# Patient Record
Sex: FEMALE | Race: BLACK OR AFRICAN AMERICAN | Employment: OTHER | URBAN - METROPOLITAN AREA
[De-identification: names, ages, dates, MRNs, and addresses within clinical notes are randomized per-mention and may not be internally consistent; named-entity substitution may affect disease eponyms.]

---

## 2017-05-25 ENCOUNTER — OFFICE VISIT (OUTPATIENT)
Dept: FAMILY MEDICINE CLINIC | Age: 82
End: 2017-05-25

## 2017-05-25 VITALS
HEIGHT: 61 IN | WEIGHT: 133 LBS | TEMPERATURE: 97.5 F | BODY MASS INDEX: 25.11 KG/M2 | SYSTOLIC BLOOD PRESSURE: 159 MMHG | DIASTOLIC BLOOD PRESSURE: 84 MMHG | RESPIRATION RATE: 18 BRPM | HEART RATE: 83 BPM

## 2017-05-25 DIAGNOSIS — I10 ESSENTIAL HYPERTENSION: ICD-10-CM

## 2017-05-25 DIAGNOSIS — E78.5 HYPERLIPIDEMIA, UNSPECIFIED HYPERLIPIDEMIA TYPE: ICD-10-CM

## 2017-05-25 DIAGNOSIS — Z78.0 POSTMENOPAUSAL: ICD-10-CM

## 2017-05-25 DIAGNOSIS — M17.0 PRIMARY OSTEOARTHRITIS OF BOTH KNEES: ICD-10-CM

## 2017-05-25 DIAGNOSIS — M81.0 OSTEOPOROSIS WITHOUT CURRENT PATHOLOGICAL FRACTURE, UNSPECIFIED OSTEOPOROSIS TYPE: ICD-10-CM

## 2017-05-25 DIAGNOSIS — E11.65 TYPE 2 DIABETES MELLITUS WITH HYPERGLYCEMIA, WITHOUT LONG-TERM CURRENT USE OF INSULIN (HCC): Primary | ICD-10-CM

## 2017-05-25 DIAGNOSIS — E55.9 VITAMIN D DEFICIENCY: ICD-10-CM

## 2017-05-25 LAB
MICROALBUMIN UR TEST STR-MCNC: 150 MG/L
MICROALBUMIN/CREAT RATIO POC: 300 MG/G

## 2017-05-25 RX ORDER — ATORVASTATIN CALCIUM 10 MG/1
TABLET, FILM COATED ORAL DAILY
COMMUNITY
End: 2017-10-05 | Stop reason: SDUPTHER

## 2017-05-25 RX ORDER — ACETAMINOPHEN 325 MG/1
TABLET ORAL
COMMUNITY

## 2017-05-25 NOTE — PATIENT INSTRUCTIONS

## 2017-05-25 NOTE — PROGRESS NOTES
Chief Complaint   Patient presents with   Celestino Merlin is a 80y.o. year old female who presents for follow up of her chronic medical problems    Previous PCP Dr. Magy Stern not seen anybody since Dr. Hardy Honeycutt retired; last seen Feb 2017    \"Mandaeism\" with her meds per daughter    Lives with granddaughter    Denies polyuria, polydipsia and polyphagia  Checks blood sugars daily-blood sugars in the 90's, no blood sugar drops since she has been on Actos    BP elevated today-has not yet taken meds because she is fasting    Has been on Boniva for a while  Last Dexa was 2014-osteoporosis  No hx of fracture  Arthritis on the left foot-has rods there          Health Maintenance Due   Topic Date Due    DTaP/Tdap/Td series (1 - Tdap) 12/30/1950    ZOSTER VACCINE AGE 60>  12/30/1989    FOOT EXAM Q1  09/08/2015    MICROALBUMIN Q1  11/04/2015    HEMOGLOBIN A1C Q6M  05/03/2016    LIPID PANEL Q1  07/02/2016    Pneumococcal 65+ Low/Medium Risk (2 of 2 - PPSV23) 08/19/2016    MEDICARE YEARLY EXAM  08/19/2016    EYE EXAM RETINAL OR DILATED Q1  11/24/2016-wears eyeglasses; has implants on both eyes/sees them Q6 months     Used to go to Podiatry, Dr. Garry Ortiz but now lives far-needs a new podiatrist-onychomychosis, diabetic foot check     ROS:    Pt denies: Wt loss, Fever/Chills, HA, Visual changes, Fatigue, Chest pain, SOB, SALOMON, Abd pain, N/V/D/C, Blood in stool or urine, Edema. Pertinent positive as above in HPI.  All others were negative    Patient Active Problem List   Diagnosis Code    Type II or unspecified type diabetes mellitus without mention of complication, not stated as uncontrolled E11.9    Essential hypertension I10    Osteoporosis without current pathological fracture M81.0    Hyperlipidemia E78.5    Primary osteoarthritis of both knees M17.0    Vitamin D deficiency E55.9       Past Medical History:   Diagnosis Date    Diabetes ([facility] [address]     Essential hypertension 5/25/2017    Hyperlipidemia 5/25/2017       Current Outpatient Prescriptions   Medication Sig Dispense Refill    atorvastatin (LIPITOR) 10 mg tablet Take  by mouth daily.  acetaminophen (TYLENOL) 325 mg tablet Take  by mouth every four (4) hours as needed for Pain.  ibandronate (BONIVA) 150 mg tablet TAKE 1 TABLET (150 MG) BY MOUTH EVERY 30 DAYS. 1 Tab 10    losartan-hydrochlorothiazide (HYZAAR) 100-12.5 mg per tablet take 1 tablet by mouth once daily 30 Tab 3    pioglitazone (ACTOS) 30 mg tablet TAKE ONE TABLET BY MOUTH EVERY DAY 30 Tab 3    ergocalciferol (VITAMIN D2) 50,000 unit capsule Take 50,000 Units by mouth every seven (7) days.  FREESTYLE TEST strip PATIENT TESTING BLOOD SUGAR ONCE DAILY 1 Package 10    FREESTYLE LANCETS 28 gauge misc DAILY USE 1 Package 10    aspirin (ASPIRIN LOW-STRENGTH) 81 mg chewable tablet CHEW AND SWALLOW ONE TABLET BY MOUTH DAILY 90 Tab 1       History   Smoking Status    Never Smoker   Smokeless Tobacco    Never Used       No Known Allergies    Patient Labs were reviewed: yes      Patient Past Records were reviewed:  yes        Objective:     Vitals:    05/25/17 0939   BP: 159/84   Pulse: 83   Resp: 18   Temp: 97.5 °F (36.4 °C)   TempSrc: Oral   Weight: 133 lb (60.3 kg)   Height: 5' 1\" (1.549 m)     Body mass index is 25.13 kg/(m^2). Exam:   Appearance: alert, well appearing,  oriented to person, place, and time, acyanotic, in no respiratory distress and well hydrated. Walks with a cane  HEENT:  NC/AT, pink conj, anicteric sclerae  Neck:  No cervical lymphadenopathy, no JVD, no thyromegaly, no carotid bruit  Heart:  RRR without M/R/G  Lungs:  CTAB, no rhonchi, rales, or wheezes with good air exchange   Abdomen:  Non-tender, pos bowel sounds, no hepatosplenomegaly  Ext:  No C/C/E, crepitus on both knees, antalgic gait    Skin: no rash  Neuro: no lateralizing signs, CNs II-XII intact      Assessment/ Plan: Rody Evans was seen today for [facility] care.     Diagnoses and all orders for this visit:    Type 2 diabetes mellitus with hyperglycemia, without long-term current use of insulin (HCC)-continue with Actos  -     HEMOGLOBIN A1C W/O EAG; Future  -     AMB POC URINE, MICROALBUMIN, SEMIQUANTITATIVE  -     REFERRAL TO PODIATRY    Essential hypertension-advised to take BP med prior to next visit  -     CBC WITH AUTOMATED DIFF; Future  -     METABOLIC PANEL, COMPREHENSIVE; Future    Hyperlipidemia, unspecified hyperlipidemia type-on Lipitor  -     LIPID PANEL; Future    Osteoporosis without current pathological fracture, unspecified osteoporosis type-advised she can discontinue Boniva as she has been on it for over 5 years, continue with daily Calcium OTC  -     Dexa Bone Density Study Axial ([id number]); Future    Vitamin D deficiency-on rx  -     VITAMIN D, 25 HYDROXY; Future    Postmenopausal  -     Dexa Bone Density Study Axial ([id number]); Future    Primary osteoarthritis of both knees-declined to see Ortho, continue with Tylenol prn        Follow-up Disposition:  Return in about 3 months (around 8/25/2017) for follow up, annual wellness. I have discussed the diagnosis with the patient and the intended plan as seen in the above orders. The patient has received an After-Visit Summary and questions were answered concerning future plans. Medication Side Effects and Warnings were discussed with patient: yes    Patient verbalized understanding of above instructions.     Ngoc Yoo MD  Internal Medicine  [facility]

## 2017-05-25 NOTE — MR AVS SNAPSHOT
Visit Information Date & Time Provider Department Dept. Phone Encounter #  
 5/25/2017 10:30 AM Hetal Barnes MD [address] [id number] Follow-up Instructions Return in about 3 months (around 8/25/2017) for follow up, annual wellness. Upcoming Health Maintenance Date Due DTaP/Tdap/Td series (1 - Tdap) 12/30/1950 ZOSTER VACCINE AGE 60> 12/30/1989 FOOT EXAM Q1 9/8/2015 MICROALBUMIN Q1 11/4/2015 HEMOGLOBIN A1C Q6M 5/3/2016 LIPID PANEL Q1 7/2/2016 Pneumococcal 65+ Low/Medium Risk (2 of 2 - PPSV23) 8/19/2016 MEDICARE YEARLY EXAM 8/19/2016 EYE EXAM RETINAL OR DILATED Q1 11/24/2016 INFLUENZA AGE 9 TO ADULT 8/1/2017 GLAUCOMA SCREENING Q2Y 11/24/2017 Allergies as of 5/25/2017  Review Complete On: 5/25/2017 By: Hetal Barnes MD  
 No Known Allergies Current Immunizations  Reviewed on 5/25/2017 Name Date Influenza High Dose Vaccine PF 11/1/2016 Influenza Vaccine 11/4/2014  9:37 AM, 10/10/2013 Influenza Vaccine (Quad) 11/3/2015  9:58 AM  
 Influenza Vaccine Split 10/18/2012 11:44 AM, 9/29/2011, 3/1/2011, 3/1/2011, 10/6/2010 Pneumococcal Conjugate (PCV-13) 8/19/2015  2:46 PM  
 Td 1/11/2013 12:00 AM  
 Zoster Vaccine, Live 1/1/2016 Reviewed by Hetal Barnes MD on 5/25/2017 at 10:26 AM  
 Reviewed by Hetal Barnes MD on 5/25/2017 at 10:31 AM  
You Were Diagnosed With   
  
 Codes Comments Type 2 diabetes mellitus with hyperglycemia, without long-term current use of insulin (HCC)    -  Primary ICD-10-CM: E11.65 ICD-9-CM: 250.00, 790.29 Essential hypertension     ICD-10-CM: I10 
ICD-9-CM: 401.9 Hyperlipidemia, unspecified hyperlipidemia type     ICD-10-CM: E78.5 ICD-9-CM: 272.4  Osteoporosis without current pathological fracture, unspecified osteoporosis type     ICD-10-CM: M81.0 ICD-9-CM: 733.00 Vitamin D deficiency     ICD-10-CM: E55.9 ICD-9-CM: 268.9 Postmenopausal     ICD-10-CM: Z78.0 ICD-9-CM: V49.81 Primary osteoarthritis of both knees     ICD-10-CM: M17.0 ICD-9-CM: 715.16 Vitals BP Pulse Temp Resp Height(growth percentile) Weight(growth percentile) 159/84 83 97.5 °F (36.4 °C) (Oral) 18 5' 1\" (1.549 m) 133 lb (60.3 kg) BMI OB Status Smoking Status 25.13 kg/m2 Hysterectomy Never Smoker BMI and BSA Data Body Mass Index Body Surface Area  
 25.13 kg/m 2 1.61 m 2 Preferred Pharmacy Pharmacy Name Phone [address] [address] [address] [place] [address] [phone] Your Updated Medication List  
  
   
This list is accurate as of: 5/25/17 10:46 AM.  Always use your most recent med list.  
  
  
  
  
 aspirin 81 mg chewable tablet Commonly known as:  ASPIRIN LOW-STRENGTH  
CHEW AND SWALLOW ONE TABLET BY MOUTH DAILY  
  
 atorvastatin 10 mg tablet Commonly known as:  LIPITOR Take  by mouth daily. FREESTYLE LANCETS 28 gauge Misc Generic drug:  lancets DAILY USE FREESTYLE TEST strip Generic drug:  glucose blood VI test strips PATIENT TESTING BLOOD SUGAR ONCE DAILY  
  
 ibandronate 150 mg tablet Commonly known as:  Bill Chaidez TAKE 1 TABLET (150 MG) BY MOUTH EVERY 30 DAYS. losartan-hydroCHLOROthiazide 100-12.5 mg per tablet Commonly known as:  HYZAAR  
take 1 tablet by mouth once daily  
  
 pioglitazone 30 mg tablet Commonly known as:  ACTOS  
TAKE ONE TABLET BY MOUTH EVERY DAY  
  
 TYLENOL 325 mg tablet Generic drug:  acetaminophen Take  by mouth every four (4) hours as needed for Pain. VITAMIN D2 50,000 unit capsule Generic drug:  ergocalciferol Take 50,000 Units by mouth every seven (7) days. We Performed the Following AMB POC URINE, MICROALBUMIN, SEMIQUANTITATIVE [19955 CPT(R)] REFERRAL TO PODIATRY [REF90 Custom] Comments:  
 Please evaluate patient for onychomycosis and diabetic foot check-wants to see Podiatry around [facility] Follow-up Instructions Return in about 3 months (around 8/25/2017) for follow up, annual wellness. To-Do List   
 05/25/2017 Lab:  CBC WITH AUTOMATED DIFF   
  
 05/25/2017 Lab:  HEMOGLOBIN A1C W/O EAG   
  
 05/25/2017 Lab:  LIPID PANEL   
  
 05/25/2017 Lab:  METABOLIC PANEL, COMPREHENSIVE   
  
 05/25/2017 Lab:  VITAMIN D, 25 HYDROXY   
  
 05/28/2017 Imaging:  DEXA BONE DENSITY STUDY AXIAL Referral Information Referral ID Referred By Referred To  
  
 [id number] Hortensia Calix Not Available Visits Status Start Date End Date 1 New Request 5/25/17 5/25/18 If your referral has a status of pending review or denied, additional information will be sent to support the outcome of this decision. Patient Instructions Learning About Diabetes Food Guidelines Your Care Instructions Meal planning is important to manage diabetes. It helps keep your blood sugar at a target level (which you set with your doctor). You don't have to eat special foods. You can eat what your family eats, including sweets once in a while. But you do have to pay attention to how often you eat and how much you eat of certain foods. You may want to work with a dietitian or a certified diabetes educator (CDE) to help you plan meals and snacks. A dietitian or CDE can also help you lose weight if that is one of your goals. What should you know about eating carbs? Managing the amount of carbohydrate (carbs) you eat is an important part of healthy meals when you have diabetes. Carbohydrate is found in many foods. · Learn which foods have carbs. And learn the amounts of carbs in different foods.  
¨ Bread, cereal, pasta, and rice have about 15 grams of carbs in a serving. A serving is 1 slice of bread (1 ounce), ½ cup of cooked cereal, or 1/3 cup of cooked pasta or rice. ¨ Fruits have 15 grams of carbs in a serving. A serving is 1 small fresh fruit, such as an apple or orange; ½ of a banana; ½ cup of cooked or canned fruit; ½ cup of fruit juice; 1 cup of melon or raspberries; or 2 tablespoons of dried fruit. ¨ Milk and no-sugar-added yogurt have 15 grams of carbs in a serving. A serving is 1 cup of milk or 2/3 cup of no-sugar-added yogurt. ¨ Starchy vegetables have 15 grams of carbs in a serving. A serving is ½ cup of mashed potatoes or sweet potato; 1 cup winter squash; ½ of a small baked potato; ½ cup of cooked beans; or ½ cup cooked corn or green peas. · Learn how much carbs to eat each day and at each meal. A dietitian or CDE can teach you how to keep track of the amount of carbs you eat. This is called carbohydrate counting. · If you are not sure how to count carbohydrate grams, use the Plate Method to plan meals. It is a good, quick way to make sure that you have a balanced meal. It also helps you spread carbs throughout the day. ¨ Divide your plate by types of foods. Put non-starchy vegetables on half the plate, meat or other protein food on one-quarter of the plate, and a grain or starchy vegetable in the final quarter of the plate. To this you can add a small piece of fruit and 1 cup of milk or yogurt, depending on how many carbs you are supposed to eat at a meal. 
· Try to eat about the same amount of carbs at each meal. Do not \"save up\" your daily allowance of carbs to eat at one meal. 
· Proteins have very little or no carbs per serving. Examples of proteins are beef, chicken, turkey, fish, eggs, tofu, cheese, cottage cheese, and peanut butter. A serving size of meat is 3 ounces, which is about the size of a deck of cards.  Examples of meat substitute serving sizes (equal to 1 ounce of meat) are 1/4 cup of cottage cheese, 1 egg, 1 tablespoon of peanut butter, and ½ cup of tofu. How can you eat out and still eat healthy? · Learn to estimate the serving sizes of foods that have carbohydrate. If you measure food at home, it will be easier to estimate the amount in a serving of restaurant food. · If the meal you order has too much carbohydrate (such as potatoes, corn, or baked beans), ask to have a low-carbohydrate food instead. Ask for a salad or green vegetables. · If you use insulin, check your blood sugar before and after eating out to help you plan how much to eat in the future. · If you eat more carbohydrate at a meal than you had planned, take a walk or do other exercise. This will help lower your blood sugar. What else should you know? · Limit saturated fat, such as the fat from meat and dairy products. This is a healthy choice because people who have diabetes are at higher risk of heart disease. So choose lean cuts of meat and nonfat or low-fat dairy products. Use olive or canola oil instead of butter or shortening when cooking. · Don't skip meals. Your blood sugar may drop too low if you skip meals and take insulin or certain medicines for diabetes. · Check with your doctor before you drink alcohol. Alcohol can cause your blood sugar to drop too low. Alcohol can also cause a bad reaction if you take certain diabetes medicines. Follow-up care is a key part of your treatment and safety. Be sure to make and go to all appointments, and call your doctor if you are having problems. It's also a good idea to know your test results and keep a list of the medicines you take. Where can you learn more? Go to http://kosta-angle.info/. Enter W124 in the search box to learn more about \"Learning About Diabetes Food Guidelines. \" Current as of: May 23, 2016 Content Version: 11.2 © 0465-3578 [employer].  Care instructions adapted under license by [employer] (which disclaims liability or warranty for this information). If you have questions about a medical condition or this instruction, always ask your healthcare professional. [address] any warranty or liability for your use of this information. Introducing [facility] HEALTH SERVICES! [facility] introduces [employer] patient portal. Now you can access parts of your medical record, email your doctor's office, and request medication refills online. 1. In your internet browser, go to [email or web address] 2. Click on the First Time User? Click Here link in the Sign In box. You will see the New Member Sign Up page. 3. Enter your [employer] Access Code exactly as it appears below. You will not need to use this code after youve completed the sign-up process. If you do not sign up before the expiration date, you must request a new code. · [employer] Access Code: [email or web address] Expires: 8/23/2017 10:42 AM 
 
4. Enter the last four digits of your Social Security Number (xxxx) and Date of Birth (mm/dd/yyyy) as indicated and click Submit. You will be taken to the next sign-up page. 5. Create a [employer] ID. This will be your [employer] login ID and cannot be changed, so think of one that is secure and easy to remember. 6. Create a [employer] password. You can change your password at any time. 7. Enter your Password Reset Question and Answer. This can be used at a later time if you forget your password. 8. Enter your e-mail address. You will receive e-mail notification when new information is available in [address] 9. Click Sign Up. You can now view and download portions of your medical record. 10. Click the Download Summary menu link to download a portable copy of your medical information. If you have questions, please visit the Frequently Asked Questions section of the [employer] website. Remember, [employer] is NOT to be used for urgent needs. For medical emergencies, dial 911. Now available from your iPhone and Android! Please provide this summary of care documentation to your next provider. Your primary care clinician is listed as Jordyn Calvin. If you have any questions after today's visit, please call [phone].

## 2017-05-25 NOTE — PROGRESS NOTES
1. Have you been to the ER, urgent care clinic since your last visit? Hospitalized since your last visit? Yes ER at [facility] in April for pain under left breast.    2. Have you seen or consulted any other health care providers outside of the [facility] since your last visit? Include any pap smears or colon screening.  No

## 2017-06-01 LAB
25(OH)D3+25(OH)D2 SERPL-MCNC: 27.5 NG/ML (ref 30–100)
ALBUMIN SERPL-MCNC: 4.4 G/DL (ref 3.5–4.7)
ALBUMIN/GLOB SERPL: 1.8 {RATIO} (ref 1.2–2.2)
ALP SERPL-CCNC: 67 IU/L (ref 39–117)
ALT SERPL-CCNC: 18 IU/L (ref 0–32)
AST SERPL-CCNC: 23 IU/L (ref 0–40)
BASOPHILS # BLD AUTO: 0 X10E3/UL (ref 0–0.2)
BASOPHILS NFR BLD AUTO: 1 %
BILIRUB SERPL-MCNC: 0.4 MG/DL (ref 0–1.2)
BUN SERPL-MCNC: 15 MG/DL (ref 8–27)
BUN/CREAT SERPL: 17 (ref 12–28)
CALCIUM SERPL-MCNC: 9.1 MG/DL (ref 8.7–10.3)
CHLORIDE SERPL-SCNC: 104 MMOL/L (ref 96–106)
CHOLEST SERPL-MCNC: 149 MG/DL (ref 100–199)
CO2 SERPL-SCNC: 24 MMOL/L (ref 18–29)
CREAT SERPL-MCNC: 0.89 MG/DL (ref 0.57–1)
EOSINOPHIL # BLD AUTO: 0.1 X10E3/UL (ref 0–0.4)
EOSINOPHIL NFR BLD AUTO: 1 %
ERYTHROCYTE [DISTWIDTH] IN BLOOD BY AUTOMATED COUNT: 16.2 % (ref 12.3–15.4)
GLOBULIN SER CALC-MCNC: 2.5 G/DL (ref 1.5–4.5)
GLUCOSE SERPL-MCNC: 84 MG/DL (ref 65–99)
HBA1C MFR BLD: 6.1 % (ref 4.8–5.6)
HCT VFR BLD AUTO: 37.9 % (ref 34–46.6)
HDLC SERPL-MCNC: 64 MG/DL
HGB BLD-MCNC: 12.1 G/DL (ref 11.1–15.9)
IMM GRANULOCYTES # BLD: 0 X10E3/UL (ref 0–0.1)
IMM GRANULOCYTES NFR BLD: 0 %
INTERPRETATION, 910389: NORMAL
INTERPRETATION: NORMAL
LDLC SERPL CALC-MCNC: 70 MG/DL (ref 0–99)
LYMPHOCYTES # BLD AUTO: 1.2 X10E3/UL (ref 0.7–3.1)
LYMPHOCYTES NFR BLD AUTO: 30 %
Lab: NORMAL
MCH RBC QN AUTO: 26.1 PG (ref 26.6–33)
MCHC RBC AUTO-ENTMCNC: 31.9 G/DL (ref 31.5–35.7)
MCV RBC AUTO: 82 FL (ref 79–97)
MONOCYTES # BLD AUTO: 0.3 X10E3/UL (ref 0.1–0.9)
MONOCYTES NFR BLD AUTO: 7 %
NEUTROPHILS # BLD AUTO: 2.5 X10E3/UL (ref 1.4–7)
NEUTROPHILS NFR BLD AUTO: 61 %
PDF IMAGE, 910387: NORMAL
PLATELET # BLD AUTO: 303 X10E3/UL (ref 150–379)
POTASSIUM SERPL-SCNC: 4.6 MMOL/L (ref 3.5–5.2)
PROT SERPL-MCNC: 6.9 G/DL (ref 6–8.5)
RBC # BLD AUTO: 4.63 X10E6/UL (ref 3.77–5.28)
SODIUM SERPL-SCNC: 143 MMOL/L (ref 134–144)
TRIGL SERPL-MCNC: 73 MG/DL (ref 0–149)
VLDLC SERPL CALC-MCNC: 15 MG/DL (ref 5–40)
WBC # BLD AUTO: 4.1 X10E3/UL (ref 3.4–10.8)

## 2017-06-01 NOTE — PROGRESS NOTES
pls let patient know when she comes in for the PPD reading that all her  labs came back ok-Vit d still on the low side so continue with rx  A1C is 6.1 so continue with same dose of lantus

## 2017-06-01 NOTE — PROGRESS NOTES
Disregard previous message re PPD and Lantus; just call patient to let her know A1c is 6.1 and Vit D still on the low side so continue with all current meds

## 2017-06-08 ENCOUNTER — HOSPITAL ENCOUNTER (OUTPATIENT)
Dept: GENERAL RADIOLOGY | Age: 82
Discharge: HOME | End: 2017-06-08
Attending: INTERNAL MEDICINE
Payer: MEDICARE

## 2017-06-08 DIAGNOSIS — M81.0 OSTEOPOROSIS WITHOUT CURRENT PATHOLOGICAL FRACTURE, UNSPECIFIED OSTEOPOROSIS TYPE: ICD-10-CM

## 2017-06-08 DIAGNOSIS — Z78.0 POSTMENOPAUSAL: ICD-10-CM

## 2017-06-08 PROCEDURE — 77080 DXA BONE DENSITY AXIAL: CPT

## 2017-06-08 NOTE — PROGRESS NOTES
pls let patient know osteoporosis on bone density test, will discuss tx with her next visit  In the meantime take Ca+D daily (citracal d or caltrate D)

## 2017-08-16 RX ORDER — IBANDRONATE SODIUM 150 MG/1
TABLET, FILM COATED ORAL
Qty: 1 TAB | Refills: 10 | Status: SHIPPED | OUTPATIENT
Start: 2017-08-16 | End: 2017-10-05 | Stop reason: ALTCHOICE

## 2017-08-16 NOTE — TELEPHONE ENCOUNTER
Requested Prescriptions     Pending Prescriptions Disp Refills    ibandronate (BONIVA) 150 mg tablet 1 Tab 10     Sig: TAKE 1 TABLET (150 MG) BY MOUTH EVERY 30 DAYS.

## 2017-09-26 ENCOUNTER — TELEPHONE (OUTPATIENT)
Dept: FAMILY MEDICINE CLINIC | Age: 82
End: 2017-09-26

## 2017-09-26 NOTE — TELEPHONE ENCOUNTER
Nurse Practitioner from Replaced by [facility] ... Patients test results PAD show moderate risk. [address].

## 2017-09-28 ENCOUNTER — OFFICE VISIT (OUTPATIENT)
Dept: FAMILY MEDICINE CLINIC | Age: 82
End: 2017-09-28

## 2017-09-28 VITALS
SYSTOLIC BLOOD PRESSURE: 184 MMHG | HEART RATE: 72 BPM | BODY MASS INDEX: 25.11 KG/M2 | RESPIRATION RATE: 16 BRPM | HEIGHT: 61 IN | OXYGEN SATURATION: 97 % | WEIGHT: 133 LBS | DIASTOLIC BLOOD PRESSURE: 97 MMHG | TEMPERATURE: 98.1 F

## 2017-09-28 DIAGNOSIS — I10 ESSENTIAL HYPERTENSION: Primary | ICD-10-CM

## 2017-09-28 DIAGNOSIS — R05.9 COUGH: ICD-10-CM

## 2017-09-28 RX ORDER — BENZONATATE 200 MG/1
200 CAPSULE ORAL
Qty: 21 CAP | Refills: 0 | Status: SHIPPED | OUTPATIENT
Start: 2017-09-28 | End: 2017-10-05

## 2017-09-28 RX ORDER — LOSARTAN POTASSIUM AND HYDROCHLOROTHIAZIDE 12.5; 1 MG/1; MG/1
TABLET ORAL
Qty: 30 TAB | Refills: 3 | Status: SHIPPED | OUTPATIENT
Start: 2017-09-28 | End: 2017-10-05 | Stop reason: SDUPTHER

## 2017-09-28 NOTE — PROGRESS NOTES
Chief Complaint   Patient presents with    Cough     HPI:    This is an 79 y/o female  patient who presents for cough since yesterday. No SOB or chest pain    BP elevated today- 184/97  Manual repeat by me 184/101  No headache or dizziness. Patient not sure if she is taking her BP med. She states she confused about her medication since she left a former PCP  Spoke with grand son and advised to pick  BP med from pharmacy and start taking today. ROS: pertinent positives as noted in HPI. All others were negative      Past Medical History:   Diagnosis Date    Diabetes ([address]     Essential hypertension 5/25/2017    Hyperlipidemia 5/25/2017       Social History     Social History    Marital status: SINGLE     Spouse name: N/A    Number of children: N/A    Years of education: N/A     Occupational History    Not on file. Social History Main Topics    Smoking status: Never Smoker    Smokeless tobacco: Never Used    Alcohol use No    Drug use: No    Sexual activity: Not on file     Other Topics Concern    Not on file     Social History Narrative     Current Outpatient Prescriptions   Medication Sig Dispense Refill    ibandronate (BONIVA) 150 mg tablet TAKE 1 TABLET (150 MG) BY MOUTH EVERY 30 DAYS. 1 Tab 10    acetaminophen (TYLENOL) 325 mg tablet Take  by mouth every four (4) hours as needed for Pain.  FREESTYLE TEST strip PATIENT TESTING BLOOD SUGAR ONCE DAILY 1 Package 10    FREESTYLE LANCETS 28 gauge misc DAILY USE 1 Package 10    aspirin (ASPIRIN LOW-STRENGTH) 81 mg chewable tablet CHEW AND SWALLOW ONE TABLET BY MOUTH DAILY 90 Tab 1    atorvastatin (LIPITOR) 10 mg tablet Take  by mouth daily.  losartan-hydrochlorothiazide (HYZAAR) 100-12.5 mg per tablet take 1 tablet by mouth once daily 30 Tab 3    pioglitazone (ACTOS) 30 mg tablet TAKE ONE TABLET BY MOUTH EVERY DAY 30 Tab 3    ergocalciferol (VITAMIN D2) 50,000 unit capsule Take 50,000 Units by mouth every seven (7) days. No Known Allergies        Physical Exam:    Vital Signs:   Visit Vitals    BP (!) 184/97    Pulse 72    Temp 98.1 °F (36.7 °C) (Oral)    Resp 16    Ht 5' 1\" (1.549 m)    Wt 133 lb (60.3 kg)    SpO2 97%    BMI 25.13 kg/m2         General: a, a & o x 3, afebrile,  interacting appropriately, in no acute distress  HEENT: head normocephalic and atraumatic, conjuctiva clear  Respiratory: lung sounds clear bilaterally, good air entry, good respiratory effort, no wheezes or crackles  Cardiovascular: normal S1S2, regular rate and rhythm, no murmurs    Assessment/Plan:      ICD-10-CM ICD-9-CM    1. Essential hypertension( uncontrolled) I10 401.9 losartan-hydroCHLOROthiazide (HYZAAR) 100-12.5 mg per tablet    Acute cough may also be contributing to elevated BP     2. Cough R05 786.2 benzonatate (TESSALON) 200 mg capsule    Plenty of water           Additional Notes: Discussed today's diagnosis, treatment plans. Discussed medication indications and side effects. After Visit Summary: Provided and discussed printed patient instructions. Answered questions in relation to today's diagnosis.   Follow-up Disposition: 1 week with Dr Keturah Serrano as previously scheduled        Ethan Sanford, NP-BC  [address] [place] [address],[address]

## 2017-09-28 NOTE — PROGRESS NOTES
1. Have you been to the ER, urgent care clinic since your last visit? Hospitalized since your last visit? No    2. Have you seen or consulted any other health care providers outside of the [address] [place] [address] since your last visit? Include any pap smears or colon screening.  No

## 2017-09-28 NOTE — MR AVS SNAPSHOT
Visit Information Date & Time Provider Department Dept. Phone Encounter #  
 9/28/2017  2:30 PM Rivera Ya NP [address] [id number] Follow-up Instructions Return for follow up appointment with Dr Tiny Unger as scheduled next week. Your Appointments 10/5/2017 10:30 AM  
Follow Up with Florencio Jones MD  
[address] ([facility]) Appt Note: 3 month follow up -and Medicare Wellness VIsit for jose; pt r/s from 8/21/17  
 [facility] [address]  
  
   
 [address] [facility] Upcoming Health Maintenance Date Due DTaP/Tdap/Td series (1 - Tdap) 1/12/2013 Pneumococcal 65+ Low/Medium Risk (2 of 2 - PPSV23) 8/19/2016 MEDICARE YEARLY EXAM 8/19/2016 INFLUENZA AGE 9 TO ADULT 8/1/2017 HEMOGLOBIN A1C Q6M 11/25/2017 MICROALBUMIN Q1 5/25/2018 LIPID PANEL Q1 5/25/2018 FOOT EXAM Q1 6/14/2018 EYE EXAM RETINAL OR DILATED Q1 9/1/2018 GLAUCOMA SCREENING Q2Y 9/1/2019 Allergies as of 9/28/2017  Review Complete On: 9/28/2017 By: Ge Lima LPN No Known Allergies Current Immunizations  Reviewed on 5/28/2017 Name Date Influenza High Dose Vaccine PF 11/1/2016 Influenza Vaccine 11/4/2014  9:37 AM, 10/10/2013 Influenza Vaccine (Quad) 11/3/2015  9:58 AM  
 Influenza Vaccine Split 10/18/2012 11:44 AM, 9/29/2011, 3/1/2011, 3/1/2011, 10/6/2010 Pneumococcal Conjugate (PCV-13) 8/19/2015  2:46 PM  
 Td 1/11/2013 12:00 AM  
 Zoster Vaccine, Live 1/1/2016 Not reviewed this visit You Were Diagnosed With   
  
 Codes Comments Essential hypertension    -  Primary ICD-10-CM: I10 
ICD-9-CM: 401.9 Cough     ICD-10-CM: R05 ICD-9-CM: 625. 2 Vitals BP Pulse Temp Resp Height(growth percentile) Weight(growth percentile) (!) 184/97 72 98.1 °F (36.7 °C) (Oral) 16 5' 1\" (1.549 m) 133 lb (60.3 kg) SpO2 BMI OB Status Smoking Status 97% 25.13 kg/m2 Hysterectomy Never Smoker BMI and BSA Data Body Mass Index Body Surface Area  
 25.13 kg/m 2 1.61 m 2 Preferred Pharmacy Pharmacy Name Phone [address] [facility], [address] [place] [address] Road [address] [facility] [phone] Your Updated Medication List  
  
   
This list is accurate as of: 9/28/17  2:44 PM.  Always use your most recent med list.  
  
  
  
  
 aspirin 81 mg chewable tablet Commonly known as:  ASPIRIN LOW-STRENGTH  
CHEW AND SWALLOW ONE TABLET BY MOUTH DAILY  
  
 atorvastatin 10 mg tablet Commonly known as:  LIPITOR Take  by mouth daily. benzonatate 200 mg capsule Commonly known as:  TESSALON Take 1 Cap by mouth three (3) times daily as needed for Cough for up to 7 days. FREESTYLE LANCETS 28 gauge Misc Generic drug:  lancets DAILY USE FREESTYLE TEST strip Generic drug:  glucose blood VI test strips PATIENT TESTING BLOOD SUGAR ONCE DAILY  
  
 ibandronate 150 mg tablet Commonly known as:  Maribruno Buttner TAKE 1 TABLET (150 MG) BY MOUTH EVERY 30 DAYS. losartan-hydroCHLOROthiazide 100-12.5 mg per tablet Commonly known as:  HYZAAR  
take 1 tablet by mouth once daily  
  
 pioglitazone 30 mg tablet Commonly known as:  ACTOS  
TAKE ONE TABLET BY MOUTH EVERY DAY  
  
 TYLENOL 325 mg tablet Generic drug:  acetaminophen Take  by mouth every four (4) hours as needed for Pain. VITAMIN D2 50,000 unit capsule Generic drug:  ergocalciferol Take 50,000 Units by mouth every seven (7) days. Prescriptions Sent to Pharmacy Refills  
 benzonatate (TESSALON) 200 mg capsule 0 Sig: Take 1 Cap by mouth three (3) times daily as needed for Cough for up to 7 days. Class: Normal  
 Pharmacy: [address] [facility], [address] [place] [address] Road  #: [phone]  Route: Oral  
 losartan-hydroCHLOROthiazide (HYZAAR) 100-12.5 mg per tablet 3 Sig: take 1 tablet by mouth once daily Class: Normal  
 Pharmacy: [facility], [address] [place] [address] #: [phone] Follow-up Instructions Return for follow up appointment with Dr Gely Strong as scheduled next week. Introducing [facility]! [facility] [facility] introduces [employer] patient portal. Now you can access parts of your medical record, email your doctor's office, and request medication refills online. 1. In your internet browser, go to [email or web address] 2. Click on the First Time User? Click Here link in the Sign In box. You will see the New Member Sign Up page. 3. Enter your [employer] Access Code exactly as it appears below. You will not need to use this code after youve completed the sign-up process. If you do not sign up before the expiration date, you must request a new code. · [employer] Access Code: [email or web address] Expires: 12/27/2017  2:44 PM 
 
4. Enter the last four digits of your Social Security Number (xxxx) and Date of Birth (mm/dd/yyyy) as indicated and click Submit. You will be taken to the next sign-up page. 5. Create a [employer] ID. This will be your [employer] login ID and cannot be changed, so think of one that is secure and easy to remember. 6. Create a [employer] password. You can change your password at any time. 7. Enter your Password Reset Question and Answer. This can be used at a later time if you forget your password. 8. Enter your e-mail address. You will receive e-mail notification when new information is available in [address] 9. Click Sign Up. You can now view and download portions of your medical record. 10. Click the Download Summary menu link to download a portable copy of your medical information.  
 
If you have questions, please visit the Frequently Asked Questions section of the [employer]. Remember, [employer] is NOT to be used for urgent needs. For medical emergencies, dial 911. Now available from your iPhone and Android! Please provide this summary of care documentation to your next provider. Your primary care clinician is listed as Juanito Nash. If you have any questions after today's visit, please call [phone].

## 2017-10-05 ENCOUNTER — OFFICE VISIT (OUTPATIENT)
Dept: FAMILY MEDICINE CLINIC | Age: 82
End: 2017-10-05

## 2017-10-05 VITALS
HEART RATE: 73 BPM | SYSTOLIC BLOOD PRESSURE: 115 MMHG | HEIGHT: 61 IN | WEIGHT: 133 LBS | RESPIRATION RATE: 16 BRPM | BODY MASS INDEX: 25.11 KG/M2 | TEMPERATURE: 97.1 F | OXYGEN SATURATION: 98 % | DIASTOLIC BLOOD PRESSURE: 70 MMHG

## 2017-10-05 DIAGNOSIS — M17.0 PRIMARY OSTEOARTHRITIS OF BOTH KNEES: ICD-10-CM

## 2017-10-05 DIAGNOSIS — Z00.00 MEDICARE ANNUAL WELLNESS VISIT, SUBSEQUENT: Primary | ICD-10-CM

## 2017-10-05 DIAGNOSIS — I10 ESSENTIAL HYPERTENSION: ICD-10-CM

## 2017-10-05 DIAGNOSIS — J20.9 ACUTE BRONCHITIS, UNSPECIFIED ORGANISM: ICD-10-CM

## 2017-10-05 DIAGNOSIS — E55.9 VITAMIN D DEFICIENCY: ICD-10-CM

## 2017-10-05 DIAGNOSIS — Z71.89 ADVANCED DIRECTIVES, COUNSELING/DISCUSSION: ICD-10-CM

## 2017-10-05 DIAGNOSIS — M81.0 AGE-RELATED OSTEOPOROSIS WITHOUT CURRENT PATHOLOGICAL FRACTURE: ICD-10-CM

## 2017-10-05 DIAGNOSIS — M25.511 RIGHT SHOULDER PAIN, UNSPECIFIED CHRONICITY: ICD-10-CM

## 2017-10-05 DIAGNOSIS — E78.5 HYPERLIPIDEMIA, UNSPECIFIED HYPERLIPIDEMIA TYPE: ICD-10-CM

## 2017-10-05 DIAGNOSIS — E11.65 TYPE 2 DIABETES MELLITUS WITH HYPERGLYCEMIA, WITHOUT LONG-TERM CURRENT USE OF INSULIN (HCC): ICD-10-CM

## 2017-10-05 LAB — HBA1C MFR BLD HPLC: 5.7 %

## 2017-10-05 RX ORDER — LOSARTAN POTASSIUM AND HYDROCHLOROTHIAZIDE 12.5; 1 MG/1; MG/1
TABLET ORAL
Qty: 90 TAB | Refills: 3 | Status: SHIPPED | OUTPATIENT
Start: 2017-10-05 | End: 2018-01-09 | Stop reason: DRUGHIGH

## 2017-10-05 RX ORDER — ATORVASTATIN CALCIUM 10 MG/1
10 TABLET, FILM COATED ORAL DAILY
Qty: 90 TAB | Refills: 3 | Status: SHIPPED | OUTPATIENT
Start: 2017-10-05 | End: 2019-01-07 | Stop reason: SDUPTHER

## 2017-10-05 RX ORDER — DICLOFENAC SODIUM 30 MG/G
GEL TOPICAL 2 TIMES DAILY
Qty: 300 G | Refills: 1 | Status: SHIPPED | OUTPATIENT
Start: 2017-10-05 | End: 2018-01-15

## 2017-10-05 RX ORDER — ERGOCALCIFEROL 1.25 MG/1
50000 CAPSULE ORAL
Qty: 12 CAP | Refills: 1 | Status: SHIPPED | OUTPATIENT
Start: 2017-10-05 | End: 2018-03-23 | Stop reason: SDUPTHER

## 2017-10-05 RX ORDER — AZITHROMYCIN 250 MG/1
TABLET, FILM COATED ORAL
Qty: 6 TAB | Refills: 0 | Status: SHIPPED | OUTPATIENT
Start: 2017-10-05 | End: 2017-10-10

## 2017-10-05 NOTE — ACP (ADVANCE CARE PLANNING)
Advance Care Planning (ACP) Provider Conversation Snapshot    Date of ACP Conversation: 10/05/17  Persons included in Conversation:  patient and daughter  Length of ACP Conversation in minutes:  <16 minutes (Non-Billable)    Authorized Decision Maker (if patient is incapable of making informed decisions):    This person is:   Trent Russell, her oldest daughter and Lee Núñez          For Patients with Decision Making Capacity:   Values/Goals: Exploration of values, goals, and preferences if recovery is not expected, even with continued medical treatment in the event of:  Imminent death  Severe, permanent brain injury    Conversation Outcomes / Follow-Up Plan:   Recommended completion of Advance Directive form after review of ACP materials and conversation with prospective healthcare agent

## 2017-10-05 NOTE — PROGRESS NOTES
Chief Complaint   Patient presents with    Follow Up Chronic Condition     3 month; MWV     1. Have you been to the ER, urgent care clinic since your last visit? Hospitalized since your last visit? No    2. Have you seen or consulted any other health care providers outside of the [address] [place] [address] since your last visit? Include any pap smears or colon screening.  No

## 2017-10-05 NOTE — PROGRESS NOTES
Chief Complaint   Patient presents with    Follow Up Chronic Condition     3 month; [address]    Annual Wellness Visit       Pt is a 80y.o. year old female who presents for follow up of her chronic medical problems    Health Maintenance Due   Topic Date Due    DTaP/Tdap/Td series (1 - Tdap) 01/12/2013    Pneumococcal 65+ Low/Medium Risk (2 of 2 - PPSV23) 08/19/2016    MEDICARE YEARLY EXAM  08/19/2016    INFLUENZA AGE 9 TO ADULT  08/01/2017     Last Point of Care HGB A1C     Lab Results   Component Value Date/Time    Hemoglobin A1c 6.1 05/25/2017 10:47 AM    Hemoglobin A1c (POC) 5.7 10/05/2017 11:20 AM   On Actos  Saw Podiatry-cut her toenails, checked feet    Lab Results   Component Value Date/Time    VITAMIN D, 25-HYDROXY 27.5 05/25/2017 10:47 AM    s/p rx    BP Readings from Last 3 Encounters:   10/05/17 115/70   09/28/17 (!) 184/97   05/25/17 159/84       Lab Results   Component Value Date/Time    Cholesterol, total 149 05/25/2017 10:47 AM    HDL Cholesterol 64 05/25/2017 10:47 AM    LDL, calculated 70 05/25/2017 10:47 AM    VLDL, calculated 15 05/25/2017 10:47 AM    Triglyceride 73 05/25/2017 10:47 AM    CHOL/HDL Ratio 3.8 09/23/2010 01:08 PM   On Lipitor   Saw NP for cough-cough pills did not help    Osteoporosis-has been on Boniva for a while, over 5 years  Declines injectables    No new problems  Has persistent pain on both knees and right shoulder pain from arthritis-declines to see Ortho    ROS:    Pt denies: Wt loss, Fever/Chills, HA, Visual changes, Fatigue, Chest pain, SOB, SALOMON, Abd pain, N/V/D/C, Blood in stool or urine, Edema. Pertinent positive as above in HPI.  All others were negative    Patient Active Problem List   Diagnosis Code    Type II or unspecified type diabetes mellitus without mention of complication, not stated as uncontrolled E11.9    Essential hypertension I10    Osteoporosis without current pathological fracture M81.0    Hyperlipidemia E78.5    Primary osteoarthritis of both knees M17.0    Vitamin D deficiency E55.9       Past Medical History:   Diagnosis Date    Diabetes ([facility] [address]     Essential hypertension 5/25/2017    Hyperlipidemia 5/25/2017       Current Outpatient Prescriptions   Medication Sig Dispense Refill    dextromethorphan-guaiFENesin (ROBITUSSIN-DM)  mg/5 mL syrup Take 10 mL by mouth every four (4) hours as needed for Cough.  losartan-hydroCHLOROthiazide (HYZAAR) 100-12.5 mg per tablet take 1 tablet by mouth once daily 90 Tab 3    atorvastatin (LIPITOR) 10 mg tablet Take 1 Tab by mouth daily. 90 Tab 3    ergocalciferol (ERGOCALCIFEROL) 50,000 unit capsule Take 1 Cap by mouth every seven (7) days. 12 Cap 1    azithromycin (ZITHROMAX) 250 mg tablet Take 2 tablets today, then take 1 tablet daily 6 Tab 0    diclofenac (SOLARAZE) 3 % topical gel Apply  to affected area two (2) times a day. 300 g 1    acetaminophen (TYLENOL) 325 mg tablet Take  by mouth every four (4) hours as needed for Pain.  FREESTYLE TEST strip PATIENT TESTING BLOOD SUGAR ONCE DAILY 1 Package 10    FREESTYLE LANCETS 28 gauge misc DAILY USE 1 Package 10    aspirin (ASPIRIN LOW-STRENGTH) 81 mg chewable tablet CHEW AND SWALLOW ONE TABLET BY MOUTH DAILY 90 Tab 1       History   Smoking Status    Never Smoker   Smokeless Tobacco    Never Used       No Known Allergies    Patient Labs were reviewed: yes      Patient Past Records were reviewed:  yes        Objective:     Vitals:    10/05/17 1034   BP: 115/70   Pulse: 73   Resp: 16   Temp: 97.1 °F (36.2 °C)   TempSrc: Oral   SpO2: 98%   Weight: 133 lb (60.3 kg)   Height: 5' 1\" (1.549 m)     Body mass index is 25.13 kg/(m^2). Exam:   Appearance: alert, well appearing,  oriented to person, place, and time, acyanotic, in no respiratory distress and well hydrated.   HEENT:  NC/AT, pink conj, anicteric sclerae, nasal congestion  Neck:  No cervical lymphadenopathy, no JVD, no thyromegaly, no carotid bruit  Heart:  RRR without M/R/G  Lungs: Scattered rhonchi, no crackles or wheezes   Abdomen:  Non-tender, pos bowel sounds, no hepatosplenomegaly  Ext:  No C/C/E, crepitus on both knees    Skin: no rash  Neuro: no lateralizing signs, CNs II-XII intact      Assessment/ Plan:   Diagnoses and all orders for this visit:    1. Medicare annual wellness visit, subsequent-see note below    2. Type 2 diabetes mellitus with hyperglycemia, without long-term current use of insulin (HCC)-ok to discontinue Actos and just watch diet  -     AMB POC HEMOGLOBIN A1C    3. Essential hypertension-controlled, continue with current med  -     losartan-hydroCHLOROthiazide (HYZAAR) 100-12.5 mg per tablet; take 1 tablet by mouth once daily    4. Hyperlipidemia, unspecified hyperlipidemia type-at goal on Lipitor  -     atorvastatin (LIPITOR) 10 mg tablet; Take 1 Tab by mouth daily. 5. Acute bronchitis, unspecified organism-sxs persistent for over 1 week despite decongestants and cough med  -     azithromycin (ZITHROMAX) 250 mg tablet; Take 2 tablets today, then take 1 tablet daily    6. Vitamin D deficiency-continue with rx  -     ergocalciferol (ERGOCALCIFEROL) 50,000 unit capsule; Take 1 Cap by mouth every seven (7) days. 7. Primary osteoarthritis of both knees  -     diclofenac (SOLARAZE) 3 % topical gel; Apply  to affected area two (2) times a day. 8. Right shoulder pain, unspecified chronicity  -     diclofenac (SOLARAZE) 3 % topical gel; Apply  to affected area two (2) times a day. 9. Age-related osteoporosis without current pathological fracture-ok to get off Boniva        Follow-up Disposition:  Return in about 3 months (around 1/5/2018) for follow up. I have discussed the diagnosis with the patient and the intended plan as seen in the above orders. The patient has received an After-Visit Summary and questions were answered concerning future plans.      Medication Side Effects and Warnings were discussed with patient: yes    Patient verbalized understanding of above instructions. Annette Edmonds MD  Internal Medicine  [facility]    This is a Subsequent Medicare Annual Wellness Exam (AWV) (Performed 12 months after IPPE or effective date of Medicare Part B enrollment, Once in a lifetime)    I have reviewed the patient's medical history in detail and updated the computerized patient record. History     Past Medical History:   Diagnosis Date    Diabetes ([address]     Essential hypertension 5/25/2017    Hyperlipidemia 5/25/2017      Past Surgical History:   Procedure Laterality Date    HX HYSTERECTOMY       Current Outpatient Prescriptions   Medication Sig Dispense Refill    dextromethorphan-guaiFENesin (ROBITUSSIN-DM)  mg/5 mL syrup Take 10 mL by mouth every four (4) hours as needed for Cough.  losartan-hydroCHLOROthiazide (HYZAAR) 100-12.5 mg per tablet take 1 tablet by mouth once daily 90 Tab 3    atorvastatin (LIPITOR) 10 mg tablet Take 1 Tab by mouth daily. 90 Tab 3    ergocalciferol (ERGOCALCIFEROL) 50,000 unit capsule Take 1 Cap by mouth every seven (7) days. 12 Cap 1    azithromycin (ZITHROMAX) 250 mg tablet Take 2 tablets today, then take 1 tablet daily 6 Tab 0    diclofenac (SOLARAZE) 3 % topical gel Apply  to affected area two (2) times a day. 300 g 1    acetaminophen (TYLENOL) 325 mg tablet Take  by mouth every four (4) hours as needed for Pain.       FREESTYLE TEST strip PATIENT TESTING BLOOD SUGAR ONCE DAILY 1 Package 10    FREESTYLE LANCETS 28 gauge misc DAILY USE 1 Package 10    aspirin (ASPIRIN LOW-STRENGTH) 81 mg chewable tablet CHEW AND SWALLOW ONE TABLET BY MOUTH DAILY 90 Tab 1     No Known Allergies  Family History   Problem Relation Age of Onset    Kidney Disease Mother     Hypertension Sister     Kidney Disease Brother      Social History   Substance Use Topics    Smoking status: Never Smoker    Smokeless tobacco: Never Used    Alcohol use No     Patient Active Problem List Diagnosis Code    Type II or unspecified type diabetes mellitus without mention of complication, not stated as uncontrolled E11.9    Essential hypertension I10    Osteoporosis without current pathological fracture M81.0    Hyperlipidemia E78.5    Primary osteoarthritis of both knees M17.0    Vitamin D deficiency E55.9       Depression Risk Factor Screening:     PHQ over the last two weeks 10/5/2017   Little interest or pleasure in doing things Not at all   Feeling down, depressed or hopeless Not at all   Total Score PHQ 2 0     Alcohol Risk Factor Screening: You do not drink alcohol or very rarely. Functional Ability and Level of Safety:   Hearing Loss  Hearing is good. Activities of Daily Living  The home contains: no safety equipment. Patient does total self care    Fall Risk  Fall Risk Assessment, last 12 mths 10/5/2017   Able to walk? Yes   Fall in past 12 months? No   Fall with injury? -   Number of falls in past 12 months -   Fall Risk Score -       Abuse Screen  Patient is not abused    Cognitive Screening   Evaluation of Cognitive Function:  Has your family/caregiver stated any concerns about your memory: no      Patient Care Team   Patient Care Team:  Al Nixon MD as PCP - General (Internal Medicine)  Minetta Cowden, MD (Ophthalmology)    Assessment/Plan   Education and counseling provided:  Are appropriate based on today's review and evaluation  End-of-Life planning (with patient's consent)  Pneumococcal Vaccine-needs Pneumovax next visit  Influenza Vaccine-ill today, advised to get it in 2-3 weeks time  Screening Mammography-declined  Colorectal cancer screening tests-no longer indicated  Cardiovascular screening blood test-fasting lipids normal  Bone mass measurement (DEXA)-done  Screening for glaucoma-wears eyeglasses; seen by Dr. Gonzalez Anderson last week ; has implants on both eyes    Diagnoses and all orders for this visit:    1.  Medicare annual wellness visit, subsequent-Refer to above for plan and to patient instructions for recommendations on HM    2.  Advanced Directive discussed with patient and her daughter-see ACP note        Health Maintenance Due   Topic Date Due    DTaP/Tdap/Td series (1 - Tdap) 01/12/2013    Pneumococcal 65+ Low/Medium Risk (2 of 2 - PPSV23) 08/19/2016    INFLUENZA AGE 9 TO ADULT  08/01/2017     RTC yearly for wellness visit

## 2017-10-05 NOTE — MR AVS SNAPSHOT
Visit Information Date & Time Provider Department Dept. Phone Encounter #  
 10/5/2017 10:30 AM [facility] [id number] Follow-up Instructions Return in about 3 months (around 1/5/2018) for follow up. Upcoming Health Maintenance Date Due DTaP/Tdap/Td series (1 - Tdap) 1/12/2013 Pneumococcal 65+ Low/Medium Risk (2 of 2 - PPSV23) 8/19/2016 MEDICARE YEARLY EXAM 8/19/2016 INFLUENZA AGE 9 TO ADULT 8/1/2017 HEMOGLOBIN A1C Q6M 11/25/2017 MICROALBUMIN Q1 5/25/2018 LIPID PANEL Q1 5/25/2018 FOOT EXAM Q1 6/14/2018 EYE EXAM RETINAL OR DILATED Q1 9/7/2018 GLAUCOMA SCREENING Q2Y 9/7/2019 Allergies as of 10/5/2017  Review Complete On: 10/5/2017 By: Jeny Mendez MD  
 No Known Allergies Current Immunizations  Reviewed on 10/5/2017 Name Date Influenza High Dose Vaccine PF 11/1/2016 Influenza Vaccine 11/4/2014  9:37 AM, 10/10/2013 Influenza Vaccine (Quad) 11/3/2015  9:58 AM  
 Influenza Vaccine Split 10/18/2012 11:44 AM, 9/29/2011, 3/1/2011, 3/1/2011, 10/6/2010 Pneumococcal Conjugate (PCV-13) 8/19/2015  2:46 PM  
 Td 1/11/2013 12:00 AM  
 Zoster Vaccine, Live 1/1/2016 Reviewed by Jeny Mendez MD on 10/5/2017 at 11:17 AM  
You Were Diagnosed With   
  
 Codes Comments Medicare annual wellness visit, subsequent    -  Primary ICD-10-CM: Z00.00 ICD-9-CM: V70.0 Essential hypertension     ICD-10-CM: I10 
ICD-9-CM: 401.9 Hyperlipidemia, unspecified hyperlipidemia type     ICD-10-CM: E78.5 ICD-9-CM: 272.4 Vitamin D deficiency     ICD-10-CM: E55.9 ICD-9-CM: 268.9 Age-related osteoporosis without current pathological fracture     ICD-10-CM: M81.0 ICD-9-CM: 733.01 Acute bronchitis, unspecified organism     ICD-10-CM: J20.9 ICD-9-CM: 466.0  Type 2 diabetes mellitus with hyperglycemia, without long-term current use of insulin (HCC)     ICD-10-CM: E11.65 
 ICD-9-CM: 250.00, 790.29 Vitals BP Pulse Temp Resp Height(growth percentile) Weight(growth percentile) 115/70 73 97.1 °F (36.2 °C) (Oral) 16 5' 1\" (1.549 m) 133 lb (60.3 kg) SpO2 BMI OB Status Smoking Status 98% 25.13 kg/m2 Hysterectomy Never Smoker BMI and BSA Data Body Mass Index Body Surface Area  
 25.13 kg/m 2 1.61 m 2 Preferred Pharmacy Pharmacy Name Phone [address] [facility], [address] [place] [address] [address] [facility] [phone] Your Updated Medication List  
  
   
This list is accurate as of: 10/5/17 11:30 AM.  Always use your most recent med list.  
  
  
  
  
 aspirin 81 mg chewable tablet Commonly known as:  ASPIRIN LOW-STRENGTH  
CHEW AND SWALLOW ONE TABLET BY MOUTH DAILY  
  
 atorvastatin 10 mg tablet Commonly known as:  LIPITOR Take 1 Tab by mouth daily. azithromycin 250 mg tablet Commonly known as:  [place] Ping Take 2 tablets today, then take 1 tablet daily  
  
 benzonatate 200 mg capsule Commonly known as:  TESSALON Take 1 Cap by mouth three (3) times daily as needed for Cough for up to 7 days. dextromethorphan-guaiFENesin  mg/5 mL syrup Commonly known as:  ROBITUSSIN-DM Take 10 mL by mouth every four (4) hours as needed for Cough.  
  
 ergocalciferol 50,000 unit capsule Commonly known as:  ERGOCALCIFEROL Take 1 Cap by mouth every seven (7) days. FREESTYLE LANCETS 28 gauge Misc Generic drug:  lancets DAILY USE FREESTYLE TEST strip Generic drug:  glucose blood VI test strips PATIENT TESTING BLOOD SUGAR ONCE DAILY losartan-hydroCHLOROthiazide 100-12.5 mg per tablet Commonly known as:  HYZAAR  
take 1 tablet by mouth once daily TYLENOL 325 mg tablet Generic drug:  acetaminophen Take  by mouth every four (4) hours as needed for Pain. Prescriptions Sent to Pharmacy Refills losartan-hydroCHLOROthiazide (HYZAAR) 100-12.5 mg per tablet 3 Sig: take 1 tablet by mouth once daily Class: Normal  
 Pharmacy: [address] [place] [address] Ph #: [phone]  
 atorvastatin (LIPITOR) 10 mg tablet 3 Sig: Take 1 Tab by mouth daily. Class: Normal  
 Pharmacy: [address] [place] [address] Ph #: [phone] Route: Oral  
 ergocalciferol (ERGOCALCIFEROL) 50,000 unit capsule 1 Sig: Take 1 Cap by mouth every seven (7) days. Class: Normal  
 Pharmacy: [address] [place] [address] Ph #: [phone] Route: Oral  
 azithromycin (ZITHROMAX) 250 mg tablet 0 Sig: Take 2 tablets today, then take 1 tablet daily Class: Normal  
 Pharmacy: [address] [place] [address] Ph #: [phone] We Performed the Following AMB POC HEMOGLOBIN A1C [65902 CPT(R)] Follow-up Instructions Return in about 3 months (around 1/5/2018) for follow up. Patient Instructions Medicare Wellness Visit, Female The best way to live healthy is to have a healthy lifestyle by eating a well-balanced diet, exercising regularly, limiting alcohol and stopping smoking. Regular physical exams and screening tests are another way to keep healthy. Preventive exams provided by your health care provider can find health problems before they become diseases or illnesses. Preventive services including immunizations, screening tests, monitoring and exams can help you take care of your own health. All people over age 72 should have a pneumovax  and and a prevnar shot to prevent pneumonia. These are once in a lifetime unless you and your provider decide differently. All people over 65 should have a yearly flu shot and a tetanus vaccine every 10 years. A bone mass density to screen for osteoporosis or thinning of the bones should be done every 2 years after 65. Screening for diabetes mellitus with a blood sugar test should be done every year. Glaucoma is a disease of the eye due to increased ocular pressure that can lead to blindness and it should be done every year by an eye professional. 
 
Cardiovascular screening tests that check for elevated lipids (fatty part of blood) which can lead to heart disease and strokes should be done every 5 years. Colorectal screening that evaluates for blood or polyps in your colon should be done yearly as a stool test or every five years as a flexible sigmoidoscope or every 10 years as a colonoscopy up to age 76. Breast cancer screening with a mammogram is recommended biennially  for women age 54-69. Screening for cervical cancer with a pap smear and pelvic exam is recommended for women after age 72 years every 2 years up to age 79 or when the provider and patient decide to stop. If there is a history of cervical abnormalities or other increased risk for cancer then the test is recommended yearly. Hepatitis C screening is also recommended for anyone born between 80 through [address]. A shingles vaccine is also recommended once in a lifetime after age 61. Your Medicare Wellness Exam is recommended annually. Here is a list of your current Health Maintenance items with a due date: 
Health Maintenance Due Topic Date Due  
 DTaP/Tdap/Td  (1 - Tdap) 01/12/2013  Pneumococcal Vaccine (2 of 2 - PPSV23) 08/19/2016 [facility] Annual Well Visit  08/19/2016  Flu Vaccine  08/01/2017 Introducing [facility]! [facility] introduces X-IO patient portal. Now you can access parts of your medical record, email your doctor's office, and request medication refills online. 1. In your internet browser, go to [email or web address] 2. Click on the First Time User? Click Here link in the Sign In box. You will see the New Member Sign Up page. 3. Enter your Smarterer Access Code exactly as it appears below. You will not need to use this code after youve completed the sign-up process. If you do not sign up before the expiration date, you must request a new code. · Smarterer Access Code: [email or web address] Expires: 12/27/2017  2:44 PM 
 
4. Enter the last four digits of your Social Security Number (xxxx) and Date of Birth (mm/dd/yyyy) as indicated and click Submit. You will be taken to the next sign-up page. 5. Create a Smarterer ID. This will be your Smarterer login ID and cannot be changed, so think of one that is secure and easy to remember. 6. Create a Smarterer password. You can change your password at any time. 7. Enter your Password Reset Question and Answer. This can be used at a later time if you forget your password. 8. Enter your e-mail address. You will receive e-mail notification when new information is available in [address] 9. Click Sign Up. You can now view and download portions of your medical record. 10. Click the Download Summary menu link to download a portable copy of your medical information. If you have questions, please visit the Frequently Asked Questions section of the Smarterer website. Remember, Smarterer is NOT to be used for urgent needs. For medical emergencies, dial 911. Now available from your iPhone and Android! Please provide this summary of care documentation to your next provider. Your primary care clinician is listed as Kal Amaya. If you have any questions after today's visit, please call [phone].

## 2017-10-05 NOTE — PATIENT INSTRUCTIONS

## 2017-10-10 PROBLEM — Z71.89 ADVANCED DIRECTIVES, COUNSELING/DISCUSSION: Status: ACTIVE | Noted: 2017-10-10

## 2018-01-09 ENCOUNTER — OFFICE VISIT (OUTPATIENT)
Dept: FAMILY MEDICINE CLINIC | Age: 83
End: 2018-01-09

## 2018-01-09 VITALS
RESPIRATION RATE: 17 BRPM | TEMPERATURE: 98.2 F | BODY MASS INDEX: 25.68 KG/M2 | DIASTOLIC BLOOD PRESSURE: 62 MMHG | HEART RATE: 78 BPM | SYSTOLIC BLOOD PRESSURE: 86 MMHG | WEIGHT: 136 LBS | HEIGHT: 61 IN

## 2018-01-09 DIAGNOSIS — E78.5 HYPERLIPIDEMIA, UNSPECIFIED HYPERLIPIDEMIA TYPE: ICD-10-CM

## 2018-01-09 DIAGNOSIS — E66.3 OVERWEIGHT (BMI 25.0-29.9): ICD-10-CM

## 2018-01-09 DIAGNOSIS — Z12.31 ENCOUNTER FOR SCREENING MAMMOGRAM FOR MALIGNANT NEOPLASM OF BREAST: ICD-10-CM

## 2018-01-09 DIAGNOSIS — E55.9 VITAMIN D DEFICIENCY: ICD-10-CM

## 2018-01-09 DIAGNOSIS — I10 ESSENTIAL HYPERTENSION: Primary | ICD-10-CM

## 2018-01-09 DIAGNOSIS — E11.65 TYPE 2 DIABETES MELLITUS WITH HYPERGLYCEMIA, WITHOUT LONG-TERM CURRENT USE OF INSULIN (HCC): ICD-10-CM

## 2018-01-09 LAB — HBA1C MFR BLD HPLC: 5.9 %

## 2018-01-09 RX ORDER — LOSARTAN POTASSIUM 25 MG/1
25 TABLET ORAL DAILY
Qty: 90 TAB | Refills: 1 | Status: SHIPPED | OUTPATIENT
Start: 2018-01-09 | End: 2018-05-15 | Stop reason: DRUGHIGH

## 2018-01-09 NOTE — MR AVS SNAPSHOT
Visit Information Date & Time Provider Department Dept. Phone Encounter #  
 1/9/2018 12:30 PM Chika Hernandez MD [address] [place] [address] [id number] Follow-up Instructions Return in about 6 weeks (around 2/20/2018) for follow up, fasting labs. Your Appointments 10/4/2018  9:15 AM  
Office Visit with Chika Hernandez MD  
[facility] Appt Note: [address] [place] [address] [address]. [address] [facility]  
  
   
 [address] Upcoming Health Maintenance Date Due DTaP/Tdap/Td series (1 - Tdap) 1/12/2013 Pneumococcal 65+ Low/Medium Risk (2 of 2 - PPSV23) 8/19/2016 HEMOGLOBIN A1C Q6M 4/5/2018 MICROALBUMIN Q1 5/25/2018 LIPID PANEL Q1 5/25/2018 FOOT EXAM Q1 6/14/2018 EYE EXAM RETINAL OR DILATED Q1 9/7/2018 MEDICARE YEARLY EXAM 10/6/2018 GLAUCOMA SCREENING Q2Y 9/7/2019 Allergies as of 1/9/2018  Review Complete On: 1/9/2018 By: Chika Hernandez MD  
 No Known Allergies Current Immunizations  Reviewed on 1/9/2018 Name Date Influenza High Dose Vaccine PF 10/31/2017, 11/1/2016 Influenza Vaccine 11/4/2014  9:37 AM, 10/10/2013 Influenza Vaccine (Quad) 11/3/2015  9:58 AM  
 Influenza Vaccine Split 10/18/2012 11:44 AM, 9/29/2011, 3/1/2011, 3/1/2011, 10/6/2010 Pneumococcal Conjugate (PCV-13) 8/19/2015  2:46 PM  
 Td 1/11/2013 12:00 AM  
 Zoster Vaccine, Live 1/1/2016 Reviewed by Alannah Cook LPN on 0/7/0842 at 13:78 PM  
 Reviewed by Chika Hernandez MD on 1/9/2018 at 12:37 PM  
 Reviewed by Chika Hernandez MD on 1/9/2018 at 12:40 PM  
You Were Diagnosed With   
  
 Codes Comments Essential hypertension    -  Primary ICD-10-CM: I10 
ICD-9-CM: 401.9 Type 2 diabetes mellitus with hyperglycemia, without long-term current use of insulin (HCC)     ICD-10-CM: E11.65 ICD-9-CM: 250.00, 790.29 Overweight (BMI 25.0-29. 9)     ICD-10-CM: X46.0 ICD-9-CM: 278.02 Vitamin D deficiency     ICD-10-CM: E55.9 ICD-9-CM: 268.9 Hyperlipidemia, unspecified hyperlipidemia type     ICD-10-CM: E78.5 ICD-9-CM: 272.4 Encounter for screening mammogram for malignant neoplasm of breast     ICD-10-CM: Z12.31 
ICD-9-CM: V76.12 Vitals BP Pulse Temp Resp Height(growth percentile) Weight(growth percentile) (!) 86/62 78 98.2 °F (36.8 °C) (Oral) 17 5' 1\" (1.549 m) 136 lb (61.7 kg) BMI OB Status Smoking Status 25.7 kg/m2 Hysterectomy Never Smoker Vitals History BMI and BSA Data Body Mass Index Body Surface Area 25.7 kg/m 2 1.63 m 2 Preferred Pharmacy Pharmacy Name Phone [address] [phone] Your Updated Medication List  
  
   
This list is accurate as of: 1/9/18  1:01 PM.  Always use your most recent med list.  
  
  
  
  
 aspirin 81 mg chewable tablet Commonly known as:  ASPIRIN LOW-STRENGTH  
CHEW AND SWALLOW ONE TABLET BY MOUTH DAILY  
  
 atorvastatin 10 mg tablet Commonly known as:  LIPITOR Take 1 Tab by mouth daily. diclofenac 3 % topical gel Commonly known as:  [place] Pour Apply  to affected area two (2) times a day. ergocalciferol 50,000 unit capsule Commonly known as:  ERGOCALCIFEROL Take 1 Cap by mouth every seven (7) days. FREESTYLE LANCETS 28 gauge Misc Generic drug:  lancets DAILY USE FREESTYLE TEST strip Generic drug:  glucose blood VI test strips PATIENT TESTING BLOOD SUGAR ONCE DAILY losartan 25 mg tablet Commonly known as:  COZAAR Take 1 Tab by mouth daily. TYLENOL 325 mg tablet Generic drug:  acetaminophen Take  by mouth every four (4) hours as needed for Pain. Prescriptions Sent to Pharmacy Refills  
 losartan (COZAAR) 25 mg tablet 1 Sig: Take 1 Tab by mouth daily. Class: Normal  
 Pharmacy: [facility], [address] [place] [address] #: [phone] Route: Oral  
  
We Performed the Following AMB POC HEMOGLOBIN A1C [47489 CPT(R)] Follow-up Instructions Return in about 6 weeks (around 2/20/2018) for follow up, fasting labs. To-Do List   
 01/12/2018 Imaging:  [facility] MAMMO BI SCREENING INCL CAD Patient Instructions DASH Diet: Care Instructions Your Care Instructions The DASH diet is an eating plan that can help lower your blood pressure. DASH stands for Dietary Approaches to Stop Hypertension. Hypertension is high blood pressure. The DASH diet focuses on eating foods that are high in calcium, potassium, and magnesium. These nutrients can lower blood pressure. The foods that are highest in these nutrients are fruits, vegetables, low-fat dairy products, nuts, seeds, and legumes. But taking calcium, potassium, and magnesium supplements instead of eating foods that are high in those nutrients does not have the same effect. The DASH diet also includes whole grains, fish, and poultry. The DASH diet is one of several lifestyle changes your doctor may recommend to lower your high blood pressure. Your doctor may also want you to decrease the amount of sodium in your diet. Lowering sodium while following the DASH diet can lower blood pressure even further than just the DASH diet alone. Follow-up care is a key part of your treatment and safety. Be sure to make and go to all appointments, and call your doctor if you are having problems. It's also a good idea to know your test results and keep a list of the medicines you take. How can you care for yourself at home? Following the DASH diet · Eat 4 to 5 servings of fruit each day. A serving is 1 medium-sized piece of fruit, ½ cup chopped or canned fruit, 1/4 cup dried fruit, or 4 ounces (½ cup) of fruit juice. Choose fruit more often than fruit juice. · Eat 4 to 5 servings of vegetables each day. A serving is 1 cup of lettuce or raw leafy vegetables, ½ cup of chopped or cooked vegetables, or 4 ounces (½ cup) of vegetable juice. Choose vegetables more often than vegetable juice. · Get 2 to 3 servings of low-fat and fat-free dairy each day. A serving is 8 ounces of milk, 1 cup of yogurt, or 1 ½ ounces of cheese. · Eat 6 to 8 servings of grains each day. A serving is 1 slice of bread, 1 ounce of dry cereal, or ½ cup of cooked rice, pasta, or cooked cereal. Try to choose whole-grain products as much as possible. · Limit lean meat, poultry, and fish to 2 servings each day. A serving is 3 ounces, about the size of a deck of cards. · Eat 4 to 5 servings of nuts, seeds, and legumes (cooked dried beans, lentils, and split peas) each week. A serving is 1/3 cup of nuts, 2 tablespoons of seeds, or ½ cup of cooked beans or peas. · Limit fats and oils to 2 to 3 servings each day. A serving is 1 teaspoon of vegetable oil or 2 tablespoons of salad dressing. · Limit sweets and added sugars to 5 servings or less a week. A serving is 1 tablespoon jelly or jam, ½ cup sorbet, or 1 cup of lemonade. · Eat less than 2,300 milligrams (mg) of sodium a day. If you limit your sodium to 1,500 mg a day, you can lower your blood pressure even more. Tips for success · Start small. Do not try to make dramatic changes to your diet all at once. You might feel that you are missing out on your favorite foods and then be more likely to not follow the plan. Make small changes, and stick with them. Once those changes become habit, add a few more changes. · Try some of the following: ¨ Make it a goal to eat a fruit or vegetable at every meal and at snacks. This will make it easy to get the recommended amount of fruits and vegetables each day. ¨ Try yogurt topped with fruit and nuts for a snack or healthy dessert. ¨ Add lettuce, tomato, cucumber, and onion to sandwiches. ¨ Combine a ready-made pizza crust with low-fat mozzarella cheese and lots of vegetable toppings. Try using tomatoes, squash, spinach, broccoli, carrots, cauliflower, and onions. ¨ Have a variety of cut-up vegetables with a low-fat dip as an appetizer instead of chips and dip. ¨ Sprinkle sunflower seeds or chopped almonds over salads. Or try adding chopped walnuts or almonds to cooked vegetables. ¨ Try some vegetarian meals using beans and peas. Add garbanzo or kidney beans to salads. Make burritos and tacos with mashed garcía beans or black beans. Where can you learn more? Go to http://kosta-angle.info/. Enter Y421 in the search box to learn more about \"DASH Diet: Care Instructions. \" Current as of: September 21, 2016 Content Version: 11.4 © 3219-1689 [employer] Hat. Care instructions adapted under license by [employer] (which disclaims liability or warranty for this information). If you have questions about a medical condition or this instruction, always ask your healthcare professional. [place] [address] any warranty or liability for your use of this information. Medicare Wellness Visit, Female The best way to live healthy is to have a healthy lifestyle by eating a well-balanced diet, exercising regularly, limiting alcohol and stopping smoking. Regular physical exams and screening tests are another way to keep healthy. Preventive exams provided by your health care provider can find health problems before they become diseases or illnesses. Preventive services including immunizations, screening tests, monitoring and exams can help you take care of your own health. All people over age 72 should have a pneumovax  and and a prevnar shot to prevent pneumonia. These are once in a lifetime unless you and your provider decide differently. All people over 65 should have a yearly flu shot and a tetanus vaccine every 10 years. A bone mass density to screen for osteoporosis or thinning of the bones should be done every 2 years after 65. Screening for diabetes mellitus with a blood sugar test should be done every year. Glaucoma is a disease of the eye due to increased ocular pressure that can lead to blindness and it should be done every year by an eye professional. 
 
Cardiovascular screening tests that check for elevated lipids (fatty part of blood) which can lead to heart disease and strokes should be done every 5 years. Colorectal screening that evaluates for blood or polyps in your colon should be done yearly as a stool test or every five years as a flexible sigmoidoscope or every 10 years as a colonoscopy up to age 76. Breast cancer screening with a mammogram is recommended biennially  for women age 54-69. Screening for cervical cancer with a pap smear and pelvic exam is recommended for women after age 72 years every 2 years up to age 79 or when the provider and patient decide to stop. If there is a history of cervical abnormalities or other increased risk for cancer then the test is recommended yearly. Hepatitis C screening is also recommended for anyone born between [birth year] through [address]. A shingles vaccine is also recommended once in a lifetime after age 61. Your Medicare Wellness Exam is recommended annually. Here is a list of your current Health Maintenance items with a due date: 
Health Maintenance Due Topic Date Due  
 DTaP/Tdap/Td  (1 - Tdap) 01/12/2013  Pneumococcal Vaccine (2 of 2 - PPSV23) 08/19/2016 Introducing [facility]! [employer] introduces [employer]formity patient portal. Now you can access parts of your medical record, email your doctor's office, and request medication refills online. 1. In your internet browser, go to [email or web address] ChoozOn (d.b.a[employer] Kangaroo)/[employer] 2. Click on the First Time User? Click Here link in the Sign In box. You will see the New Member Sign Up page. 3. Enter your Ips[employer] The[employer]ies Access Code exactly as it appears below. You will not need to use this code after youve completed the sign-up process. If you do not sign up before the expiration date, you must request a new code. · [employer] Access Code: [email or web address] Expires: 4/9/2018  1:01 PM 
 
4. Enter the last four digits of your Social Security Number (xxxx) and Date of Birth (mm/dd/yyyy) as indicated and click Submit. You will be taken to the next sign-up page. 5. Create a I[employer] The[employer] ID. This will be your [employer] login ID and cannot be changed, so think of one that is secure and easy to remember. 6. Create a [employer] password. You can change your password at any time. 7. Enter your Password Reset Question and Answer. This can be used at a later time if you forget your password. 8. Enter your e-mail address. You will receive e-mail notification when new information is available in [address] 9. Click Sign Up. You can now view and download portions of your medical record. 10. Click the Download Summary menu link to download a portable copy of your medical information. If you have questions, please visit the Frequently Asked Questions section of the [employer] website. Remember, [employer] is NOT to be used for urgent needs. For medical emergencies, dial 911. Now available from your iPhone and Android! Please provide this summary of care documentation to your next provider. Your primary care clinician is listed as Izabela Fox. If you have any questions after today's visit, please call [phone].

## 2018-01-09 NOTE — PROGRESS NOTES
Chief Complaint   Patient presents with    Hypertension     *Follow up    Cholesterol Problem     *Follow up    Dizziness       Pt is a 80y.o. year old female who presents for follow up of her chronic medical problems    BP Readings from Last 3 Encounters:   01/09/18 (!) 86/62   10/05/17 115/70   09/28/17 (!) 184/97     Wt Readings from Last 3 Encounters:   01/09/18 136 lb (61.7 kg)   10/05/17 133 lb (60.3 kg)   09/28/17 133 lb (60.3 kg)   BMI 25.7    Last Point of Care HGB A1C  Hemoglobin A1c (POC)   Date Value Ref Range Status   10/05/2017 5.7 % Final   Denies polyuria, polydipsia and polyphagia  Off Lantus      Lab Results   Component Value Date/Time    VITAMIN D, 25-HYDROXY 27.5 05/25/2017 10:47 AM   On rx    Lab Results   Component Value Date/Time    Cholesterol, total 149 05/25/2017 10:47 AM    HDL Cholesterol 64 05/25/2017 10:47 AM    LDL, calculated 70 05/25/2017 10:47 AM    VLDL, calculated 15 05/25/2017 10:47 AM    Triglyceride 73 05/25/2017 10:47 AM    CHOL/HDL Ratio 3.8 09/23/2010 01:08 PM   On Lipitor-compliant        Health Maintenance Due   Topic Date Due    DTaP/Tdap/Td series (1 - Tdap) 01/12/2013    Pneumococcal 65+ Low/Medium Risk (2 of 2 - PPSV23) 08/19/2016      Arthritis pain otherwise she denies any sxs-says she never got the rx for Diclofenac cream    Lives with granddaughter  Declined home health    ROS:    Pt denies: Wt loss, Fever/Chills, HA, Visual changes, Fatigue, Chest pain, SOB, SALOMON, Abd pain, N/V/D/C, Blood in stool or urine, Edema. Pertinent positive as above in HPI.  All others were negative    Patient Active Problem List   Diagnosis Code    Type II or unspecified type diabetes mellitus without mention of complication, not stated as uncontrolled E11.9    Essential hypertension I10    Osteoporosis without current pathological fracture M81.0    Hyperlipidemia E78.5    Primary osteoarthritis of both knees M17.0    Vitamin D deficiency E55.9    Advanced directives, counseling/discussion Z71.89    Overweight (BMI 25.0-29. 9) E66.3       Past Medical History:   Diagnosis Date    Diabetes ([facility] [address]     Essential hypertension 5/25/2017    Hyperlipidemia 5/25/2017       Current Outpatient Prescriptions   Medication Sig Dispense Refill    losartan-hydroCHLOROthiazide (HYZAAR) 100-12.5 mg per tablet take 1 tablet by mouth once daily 90 Tab 3    atorvastatin (LIPITOR) 10 mg tablet Take 1 Tab by mouth daily. 90 Tab 3    ergocalciferol (ERGOCALCIFEROL) 50,000 unit capsule Take 1 Cap by mouth every seven (7) days. 12 Cap 1    diclofenac (SOLARAZE) 3 % topical gel Apply  to affected area two (2) times a day. 300 g 1    acetaminophen (TYLENOL) 325 mg tablet Take  by mouth every four (4) hours as needed for Pain.  FREESTYLE TEST strip PATIENT TESTING BLOOD SUGAR ONCE DAILY 1 Package 10    FREESTYLE LANCETS 28 gauge misc DAILY USE 1 Package 10    aspirin (ASPIRIN LOW-STRENGTH) 81 mg chewable tablet CHEW AND SWALLOW ONE TABLET BY MOUTH DAILY 90 Tab 1    dextromethorphan-guaiFENesin (ROBITUSSIN-DM)  mg/5 mL syrup Take 10 mL by mouth every four (4) hours as needed for Cough. History   Smoking Status    Never Smoker   Smokeless Tobacco    Never Used       No Known Allergies    Patient Labs were reviewed: yes      Patient Past Records were reviewed:  yes        Objective:     Vitals:    01/09/18 1223 01/09/18 1229 01/09/18 1232   BP: (!) 86/52 (!) 89/61 (!) 86/62   Pulse: 73 78    Resp: 17     Temp: 98.2 °F (36.8 °C)     TempSrc: Oral     Weight: 136 lb (61.7 kg)     Height: 5' 1\" (1.549 m)       Body mass index is 25.7 kg/(m^2). Exam:   Appearance: alert, well appearing,  oriented to person, place, and time, acyanotic, in no respiratory distress and well hydrated.   HEENT:  NC/AT, pink conj, anicteric sclerae  Neck:  No cervical lymphadenopathy, no JVD, no thyromegaly, no carotid bruit  Heart:  RRR without M/R/G  Lungs:  CTAB, no rhonchi, rales, or wheezes with good air exchange   Abdomen:  Non-tender, pos bowel sounds, no hepatosplenomegaly  Ext:  No C/C/E    Skin: no rash  Neuro: no lateralizing signs, CNs II-XII intact    Last Point of Care HGB A1C  Hemoglobin A1c (POC)   Date Value Ref Range Status   01/09/2018 5.9 % Final        Assessment/ Plan:   Diagnoses and all orders for this visit:    1. Essential hypertension-BP quite low today; will discontinue the Losartan HCT and will just put her on renal protection dose of the ARB  -     losartan (COZAAR) 25 mg tablet; Take 1 Tab by mouth daily. 2. Type 2 diabetes mellitus with hyperglycemia, without long-term current use of insulin (HCC)-controlled off meds; continue to watch diet  -     AMB POC HEMOGLOBIN A1C  -     losartan (COZAAR) 25 mg tablet; Take 1 Tab by mouth daily. 3. Overweight (BMI 25.0-29. 9)-likely a miscalculation of her height     4. Vitamin D deficiency-on rx  Lab Results   Component Value Date/Time    VITAMIN D, 25-HYDROXY 27.5 05/25/2017 10:47 AM         5. Hyperlipidemia, unspecified hyperlipidemia type-at goal on Lipitor    6. OA, multiple sites-ok to take Tylenol prn; avoid OTC NSAIDS    7. Encounter for screening mammogram for malignant neoplasm of breast-advised she no longer needs to do this but her grand daughter would like her to have it done  -     Bilateral Digital Screening Mammography; Future        Follow-up Disposition:  Return in about 6 weeks (around 2/20/2018) for follow up, fasting labs. Pneumovax next visit      I have discussed the diagnosis with the patient and the intended plan as seen in the above orders. The patient has received an After-Visit Summary and questions were answered concerning future plans. Medication Side Effects and Warnings were discussed with patient: yes    Patient verbalized understanding of above instructions.     Harris Goodson MD  Internal Medicine  [facility]

## 2018-01-09 NOTE — PATIENT INSTRUCTIONS
DASH Diet: Care Instructions  Your Care Instructions    The DASH diet is an eating plan that can help lower your blood pressure. DASH stands for Dietary Approaches to Stop Hypertension. Hypertension is high blood pressure. The DASH diet focuses on eating foods that are high in calcium, potassium, and magnesium. These nutrients can lower blood pressure. The foods that are highest in these nutrients are fruits, vegetables, low-fat dairy products, nuts, seeds, and legumes. But taking calcium, potassium, and magnesium supplements instead of eating foods that are high in those nutrients does not have the same effect. The DASH diet also includes whole grains, fish, and poultry. The DASH diet is one of several lifestyle changes your doctor may recommend to lower your high blood pressure. Your doctor may also want you to decrease the amount of sodium in your diet. Lowering sodium while following the DASH diet can lower blood pressure even further than just the DASH diet alone. Follow-up care is a key part of your treatment and safety. Be sure to make and go to all appointments, and call your doctor if you are having problems. It's also a good idea to know your test results and keep a list of the medicines you take. How can you care for yourself at home? Following the DASH diet  · Eat 4 to 5 servings of fruit each day. A serving is 1 medium-sized piece of fruit, ½ cup chopped or canned fruit, 1/4 cup dried fruit, or 4 ounces (½ cup) of fruit juice. Choose fruit more often than fruit juice. · Eat 4 to 5 servings of vegetables each day. A serving is 1 cup of lettuce or raw leafy vegetables, ½ cup of chopped or cooked vegetables, or 4 ounces (½ cup) of vegetable juice. Choose vegetables more often than vegetable juice. · Get 2 to 3 servings of low-fat and fat-free dairy each day. A serving is 8 ounces of milk, 1 cup of yogurt, or 1 ½ ounces of cheese. · Eat 6 to 8 servings of grains each day.  A serving is 1 slice of bread, 1 ounce of dry cereal, or ½ cup of cooked rice, pasta, or cooked cereal. Try to choose whole-grain products as much as possible. · Limit lean meat, poultry, and fish to 2 servings each day. A serving is 3 ounces, about the size of a deck of cards. · Eat 4 to 5 servings of nuts, seeds, and legumes (cooked dried beans, lentils, and split peas) each week. A serving is 1/3 cup of nuts, 2 tablespoons of seeds, or ½ cup of cooked beans or peas. · Limit fats and oils to 2 to 3 servings each day. A serving is 1 teaspoon of vegetable oil or 2 tablespoons of salad dressing. · Limit sweets and added sugars to 5 servings or less a week. A serving is 1 tablespoon jelly or jam, ½ cup sorbet, or 1 cup of lemonade. · Eat less than 2,300 milligrams (mg) of sodium a day. If you limit your sodium to 1,500 mg a day, you can lower your blood pressure even more. Tips for success  · Start small. Do not try to make dramatic changes to your diet all at once. You might feel that you are missing out on your favorite foods and then be more likely to not follow the plan. Make small changes, and stick with them. Once those changes become habit, add a few more changes. · Try some of the following:  ¨ Make it a goal to eat a fruit or vegetable at every meal and at snacks. This will make it easy to get the recommended amount of fruits and vegetables each day. ¨ Try yogurt topped with fruit and nuts for a snack or healthy dessert. ¨ Add lettuce, tomato, cucumber, and onion to sandwiches. ¨ Combine a ready-made pizza crust with low-fat mozzarella cheese and lots of vegetable toppings. Try using tomatoes, squash, spinach, broccoli, carrots, cauliflower, and onions. ¨ Have a variety of cut-up vegetables with a low-fat dip as an appetizer instead of chips and dip. ¨ Sprinkle sunflower seeds or chopped almonds over salads. Or try adding chopped walnuts or almonds to cooked vegetables.   ¨ Try some vegetarian meals using beans and peas. Add garbanzo or kidney beans to salads. Make burritos and tacos with mashed garcía beans or black beans. Where can you learn more? Go to http://kosta-angle.info/. Enter J081 in the search box to learn more about \"DASH Diet: Care Instructions. \"  Current as of: September 21, 2016  Content Version: 11.4  © 9763-9933 [employer]. Care instructions adapted under license by [employer] (which disclaims liability or warranty for this information). If you have questions about a medical condition or this instruction, always ask your healthcare professional. [place] [address] any warranty or liability for your use of this information. Medicare Wellness Visit, Female    The best way to live healthy is to have a healthy lifestyle by eating a well-balanced diet, exercising regularly, limiting alcohol and stopping smoking. Regular physical exams and screening tests are another way to keep healthy. Preventive exams provided by your health care provider can find health problems before they become diseases or illnesses. Preventive services including immunizations, screening tests, monitoring and exams can help you take care of your own health. All people over age 72 should have a pneumovax  and and a prevnar shot to prevent pneumonia. These are once in a lifetime unless you and your provider decide differently. All people over 65 should have a yearly flu shot and a tetanus vaccine every 10 years. A bone mass density to screen for osteoporosis or thinning of the bones should be done every 2 years after 65. Screening for diabetes mellitus with a blood sugar test should be done every year.     Glaucoma is a disease of the eye due to increased ocular pressure that can lead to blindness and it should be done every year by an eye professional.    Cardiovascular screening tests that check for elevated lipids (fatty part of blood) which can lead to heart disease and strokes should be done every 5 years. Colorectal screening that evaluates for blood or polyps in your colon should be done yearly as a stool test or every five years as a flexible sigmoidoscope or every 10 years as a colonoscopy up to age 76. Breast cancer screening with a mammogram is recommended biennially  for women age 54-69. Screening for cervical cancer with a pap smear and pelvic exam is recommended for women after age 72 years every 2 years up to age 79 or when the provider and patient decide to stop. If there is a history of cervical abnormalities or other increased risk for cancer then the test is recommended yearly. Hepatitis C screening is also recommended for anyone born between [birth year] through [address]. A shingles vaccine is also recommended once in a lifetime after age 61. Your Medicare Wellness Exam is recommended annually.     Here is a list of your current Health Maintenance items with a due date:  Health Maintenance Due   Topic Date Due    DTaP/Tdap/Td  (1 - Tdap) 01/12/2013    Pneumococcal Vaccine (2 of 2 - PPSV23) 08/19/2016

## 2018-01-09 NOTE — PROGRESS NOTES
1. Have you been to the ER, urgent care clinic since your last visit? Hospitalized since your last visit? No    2. Have you seen or consulted any other health care providers outside of the [address] [place] [address] since your last visit? Include any pap smears or colon screening. No     Patient presents for follow up.

## 2018-02-26 ENCOUNTER — OFFICE VISIT (OUTPATIENT)
Dept: FAMILY MEDICINE CLINIC | Age: 83
End: 2018-02-26

## 2018-02-26 VITALS
RESPIRATION RATE: 17 BRPM | TEMPERATURE: 97.6 F | HEIGHT: 61 IN | DIASTOLIC BLOOD PRESSURE: 77 MMHG | HEART RATE: 66 BPM | SYSTOLIC BLOOD PRESSURE: 127 MMHG | WEIGHT: 139 LBS | BODY MASS INDEX: 26.24 KG/M2

## 2018-02-26 DIAGNOSIS — E11.65 TYPE 2 DIABETES MELLITUS WITH HYPERGLYCEMIA, WITHOUT LONG-TERM CURRENT USE OF INSULIN (HCC): ICD-10-CM

## 2018-02-26 DIAGNOSIS — I10 ESSENTIAL HYPERTENSION: Primary | ICD-10-CM

## 2018-02-26 DIAGNOSIS — E78.5 HYPERLIPIDEMIA, UNSPECIFIED HYPERLIPIDEMIA TYPE: ICD-10-CM

## 2018-02-26 DIAGNOSIS — E55.9 VITAMIN D DEFICIENCY: ICD-10-CM

## 2018-02-26 NOTE — PROGRESS NOTES
Chief Complaint   Patient presents with    Follow Up Chronic Condition     6 week; patient had orange juice       Pt is a 80y.o. year old female who presents for follow up of her chronic medical problems    Health Maintenance Due   Topic Date Due    DTaP/Tdap/Td series (1 - Tdap) 01/12/2013    Pneumococcal 65+ Low/Medium Risk (2 of 2 - PPSV23) 08/19/2016-needs pneumovax-had it done at [facility] since last visit/will get copy     BP Readings from Last 3 Encounters:   02/26/18 127/77   01/09/18 (!) 86/62   10/05/17 115/70   Meds adjusted down last visit because of low BP    Last labs done 5/2017  Fasting today    Occasional dizziness that gets better with OJ    Last Point of Care HGB A1C  Hemoglobin A1c (POC)   Date Value Ref Range Status   01/09/2018 5.9 % Final    off diabetes meds    Lab Results   Component Value Date/Time    VITAMIN D, 25-HYDROXY 27.5 (L) 05/25/2017 10:47 AM     On rx    Lab Results   Component Value Date/Time    Cholesterol, total 149 05/25/2017 10:47 AM    HDL Cholesterol 64 05/25/2017 10:47 AM    LDL, calculated 70 05/25/2017 10:47 AM    VLDL, calculated 15 05/25/2017 10:47 AM    Triglyceride 73 05/25/2017 10:47 AM    CHOL/HDL Ratio 3.8 09/23/2010 01:08 PM   On Lipitor-compliant      ROS:    Pt denies: Wt loss, Fever/Chills, HA, Visual changes, Fatigue, Chest pain, SOB, SALOMON, Abd pain, N/V/D/C, Blood in stool or urine, Edema. Pertinent positive as above in HPI. All others were negative    Patient Active Problem List   Diagnosis Code    Essential hypertension I10    Osteoporosis without current pathological fracture M81.0    Hyperlipidemia E78.5    Primary osteoarthritis of both knees M17.0    Vitamin D deficiency E55.9    Advanced directives, counseling/discussion Z71.89    Overweight (BMI 25.0-29. 9) E66.3    Type 2 diabetes mellitus with hyperglycemia, without long-term current use of insulin (HCC) E11.65       Past Medical History:   Diagnosis Date    Diabetes ([address]     Essential hypertension 5/25/2017    Hyperlipidemia 5/25/2017       Current Outpatient Prescriptions   Medication Sig Dispense Refill    losartan (COZAAR) 25 mg tablet Take 1 Tab by mouth daily. 90 Tab 1    atorvastatin (LIPITOR) 10 mg tablet Take 1 Tab by mouth daily. 90 Tab 3    ergocalciferol (ERGOCALCIFEROL) 50,000 unit capsule Take 1 Cap by mouth every seven (7) days. 12 Cap 1    acetaminophen (TYLENOL) 325 mg tablet Take  by mouth every four (4) hours as needed for Pain.  FREESTYLE TEST strip PATIENT TESTING BLOOD SUGAR ONCE DAILY 1 Package 10    FREESTYLE LANCETS 28 gauge misc DAILY USE 1 Package 10    aspirin (ASPIRIN LOW-STRENGTH) 81 mg chewable tablet CHEW AND SWALLOW ONE TABLET BY MOUTH DAILY 90 Tab 1       History   Smoking Status    Never Smoker   Smokeless Tobacco    Never Used       No Known Allergies    Patient Labs were reviewed: yes      Patient Past Records were reviewed:  yes        Objective:     Vitals:    02/26/18 0858   BP: 127/77   Pulse: 66   Resp: 17   Temp: 97.6 °F (36.4 °C)   TempSrc: Oral   Weight: 139 lb (63 kg)   Height: 5' 1\" (1.549 m)     Body mass index is 26.26 kg/(m^2). Exam:   Appearance: alert, well appearing,  oriented to person, place, and time, acyanotic, in no respiratory distress and well hydrated. HEENT:  NC/AT, pink conj, anicteric sclerae  Neck:  No cervical lymphadenopathy, no JVD, no thyromegaly, no carotid bruit  Heart:  RRR without M/R/G  Lungs:  CTAB, no rhonchi, rales, or wheezes with good air exchange   Abdomen:  Non-tender, pos bowel sounds, no hepatosplenomegaly  Ext:  No C/C/E    Skin: no rash  Neuro: no lateralizing signs, CNs II-XII intact      Assessment/ Plan:   Diagnoses and all orders for this visit:    1. Essential hypertension-now controlled, continue with present meds  -     CBC WITH AUTOMATED DIFF; Future  -     METABOLIC PANEL, COMPREHENSIVE; Future    2.  Hyperlipidemia, unspecified hyperlipidemia type-at goal on Lipitor  -     LIPID PANEL; Future    3. Vitamin D deficiency-on rx  -     VITAMIN D, 25 HYDROXY; Future    4. Type 2 diabetes mellitus with hyperglycemia, without long-term current use of insulin (HCC)-controlled off meds; advised to try milk instead of OJ as this can spike her blood sugar        Follow-up Disposition:  Return in about 3 months (around 5/26/2018) for follow up. I have discussed the diagnosis with the patient and the intended plan as seen in the above orders. The patient has received an After-Visit Summary and questions were answered concerning future plans. Medication Side Effects and Warnings were discussed with patient: yes    Patient verbalized understanding of above instructions.     Ronald Salguero MD  Internal Medicine  [facility]

## 2018-02-26 NOTE — MR AVS SNAPSHOT
Celeste Barclay [place] [address] [facility] [id number] 
[phone] Patient: Alba Pringle MRN: [id number] OUP:00/16/7523 Visit Information Date & Time Provider Department Dept. Phone Encounter #  
 2/26/2018  9:00 AM Moises Scott MD Yazmin Manley [id number] Follow-up Instructions Return in about 3 months (around 5/26/2018) for follow up. Your Appointments 10/4/2018  9:15 AM  
Office Visit with MD Katherine Schmid ([facility]) Appt Note: [address] [facility] [address] [facility] [facility] [address]  
  
   
 [address]  [address] [address] [place] [address] [address] Upcoming Health Maintenance Date Due DTaP/Tdap/Td series (1 - Tdap) 1/12/2013 Pneumococcal 65+ Low/Medium Risk (2 of 2 - PPSV23) 8/19/2016 MICROALBUMIN Q1 5/25/2018 LIPID PANEL Q1 5/25/2018 FOOT EXAM Q1 6/14/2018 HEMOGLOBIN A1C Q6M 7/9/2018 EYE EXAM RETINAL OR DILATED Q1 9/7/2018 MEDICARE YEARLY EXAM 10/6/2018 GLAUCOMA SCREENING Q2Y 9/7/2019 Allergies as of 2/26/2018  Review Complete On: 2/26/2018 By: Moiess Scott MD  
 No Known Allergies Current Immunizations  Reviewed on 2/26/2018 Name Date Influenza High Dose Vaccine PF 10/31/2017, 11/1/2016 Influenza Vaccine 11/4/2014  9:37 AM, 10/10/2013 Influenza Vaccine (Quad) 11/3/2015  9:58 AM  
 Influenza Vaccine Split 10/18/2012 11:44 AM, 9/29/2011, 3/1/2011, 3/1/2011, 10/6/2010 Pneumococcal Conjugate (PCV-13) 8/19/2015  2:46 PM  
 Td 1/11/2013 12:00 AM  
 Zoster Vaccine, Live 1/1/2016 Reviewed by Moises Scott MD on 2/26/2018 at  9:14 AM  
 Reviewed by Moises Scott MD on 2/26/2018 at  9:24 AM  
You Were Diagnosed With   
  
 Codes Comments Essential hypertension    -  Primary ICD-10-CM: I10 
ICD-9-CM: 401.9 Hyperlipidemia, unspecified hyperlipidemia type     ICD-10-CM: E78.5 ICD-9-CM: 272.4 Vitamin D deficiency     ICD-10-CM: E55.9 ICD-9-CM: 268.9 Type 2 diabetes mellitus with hyperglycemia, without long-term current use of insulin (HCC)     ICD-10-CM: E11.65 ICD-9-CM: 250.00, 790.29 Vitals BP Pulse Temp Resp Height(growth percentile) Weight(growth percentile) 127/77 66 97.6 °F (36.4 °C) (Oral) 17 5' 1\" (1.549 m) 139 lb (63 kg) BMI OB Status Smoking Status 26.26 kg/m2 Hysterectomy Never Smoker Vitals History BMI and BSA Data Body Mass Index Body Surface Area  
 26.26 kg/m 2 1.65 m 2 Preferred Pharmacy Pharmacy Name Phone [address] [address] [address] [place] [address] [phone] Your Updated Medication List  
  
   
This list is accurate as of 2/26/18  9:33 AM.  Always use your most recent med list.  
  
  
  
  
 aspirin 81 mg chewable tablet Commonly known as:  ASPIRIN LOW-STRENGTH  
CHEW AND SWALLOW ONE TABLET BY MOUTH DAILY  
  
 atorvastatin 10 mg tablet Commonly known as:  LIPITOR Take 1 Tab by mouth daily. ergocalciferol 50,000 unit capsule Commonly known as:  ERGOCALCIFEROL Take 1 Cap by mouth every seven (7) days. FREESTYLE LANCETS 28 gauge Misc Generic drug:  lancets DAILY USE FREESTYLE TEST strip Generic drug:  glucose blood VI test strips PATIENT TESTING BLOOD SUGAR ONCE DAILY losartan 25 mg tablet Commonly known as:  COZAAR Take 1 Tab by mouth daily. TYLENOL 325 mg tablet Generic drug:  acetaminophen Take  by mouth every four (4) hours as needed for Pain. Follow-up Instructions Return in about 3 months (around 5/26/2018) for follow up. To-Do List   
 02/26/2018 Lab:  CBC WITH AUTOMATED DIFF   
  
 02/26/2018 Lab:  LIPID PANEL   
  
 02/26/2018 Lab:  METABOLIC PANEL, COMPREHENSIVE   
  
 02/26/2018 Lab: VITAMIN D, 25 HYDROXY Patient Instructions High Blood Pressure: Care Instructions Your Care Instructions If your blood pressure is usually above 140/90, you have high blood pressure, or hypertension. That means the top number is 140 or higher or the bottom number is 90 or higher, or both. Despite what a lot of people think, high blood pressure usually doesn't cause headaches or make you feel dizzy or lightheaded. It usually has no symptoms. But it does increase your risk for heart attack, stroke, and kidney or eye damage. The higher your blood pressure, the more your risk increases. Your doctor will give you a goal for your blood pressure. Your goal will be based on your health and your age. An example of a goal is to keep your blood pressure below 140/90. Lifestyle changes, such as eating healthy and being active, are always important to help lower blood pressure. You might also take medicine to reach your blood pressure goal. 
Follow-up care is a key part of your treatment and safety. Be sure to make and go to all appointments, and call your doctor if you are having problems. It's also a good idea to know your test results and keep a list of the medicines you take. How can you care for yourself at home? Medical treatment · If you stop taking your medicine, your blood pressure will go back up. You may take one or more types of medicine to lower your blood pressure. Be safe with medicines. Take your medicine exactly as prescribed. Call your doctor if you think you are having a problem with your medicine. · Talk to your doctor before you start taking aspirin every day. Aspirin can help certain people lower their risk of a heart attack or stroke. But taking aspirin isn't right for everyone, because it can cause serious bleeding. · See your doctor regularly. You may need to see the doctor more often at first or until your blood pressure comes down. · If you are taking blood pressure medicine, talk to your doctor before you take decongestants or anti-inflammatory medicine, such as ibuprofen. Some of these medicines can raise blood pressure. · Learn how to check your blood pressure at home. Lifestyle changes · Stay at a healthy weight. This is especially important if you put on weight around the waist. Losing even 10 pounds can help you lower your blood pressure. · If your doctor recommends it, get more exercise. Walking is a good choice. Bit by bit, increase the amount you walk every day. Try for at least 30 minutes on most days of the week. You also may want to swim, bike, or do other activities. · Avoid or limit alcohol. Talk to your doctor about whether you can drink any alcohol. · Try to limit how much sodium you eat to less than 2,300 milligrams (mg) a day. Your doctor may ask you to try to eat less than 1,500 mg a day. · Eat plenty of fruits (such as bananas and oranges), vegetables, legumes, whole grains, and low-fat dairy products. · Lower the amount of saturated fat in your diet. Saturated fat is found in animal products such as milk, cheese, and meat. Limiting these foods may help you lose weight and also lower your risk for heart disease. · Do not smoke. Smoking increases your risk for heart attack and stroke. If you need help quitting, talk to your doctor about stop-smoking programs and medicines. These can increase your chances of quitting for good. When should you call for help? Call 911 anytime you think you may need emergency care. This may mean having symptoms that suggest that your blood pressure is causing a serious heart or blood vessel problem. Your blood pressure may be over 180/110. ? For example, call 911 if: 
? · You have symptoms of a heart attack. These may include: ¨ Chest pain or pressure, or a strange feeling in the chest. 
¨ Sweating. ¨ Shortness of breath. ¨ Nausea or vomiting. ¨ Pain, pressure, or a strange feeling in the back, neck, jaw, or upper belly or in one or both shoulders or arms. ¨ Lightheadedness or sudden weakness. ¨ A fast or irregular heartbeat. ? · You have symptoms of a stroke. These may include: 
¨ Sudden numbness, tingling, weakness, or loss of movement in your face, arm, or leg, especially on only one side of your body. ¨ Sudden vision changes. ¨ Sudden trouble speaking. ¨ Sudden confusion or trouble understanding simple statements. ¨ Sudden problems with walking or balance. ¨ A sudden, severe headache that is different from past headaches. ? · You have severe back or belly pain. ?Do not wait until your blood pressure comes down on its own. Get help right away. ?Call your doctor now or seek immediate care if: 
? · Your blood pressure is much higher than normal (such as 180/110 or higher), but you don't have symptoms. ? · You think high blood pressure is causing symptoms, such as: ¨ Severe headache. ¨ Blurry vision. ? Watch closely for changes in your health, and be sure to contact your doctor if: 
? · Your blood pressure measures 140/90 or higher at least 2 times. That means the top number is 140 or higher or the bottom number is 90 or higher, or both. ? · You think you may be having side effects from your blood pressure medicine. ? · Your blood pressure is usually normal, but it goes above normal at least 2 times. Where can you learn more? Go to http://kosta-angle.info/. Enter U612 in the search box to learn more about \"High Blood Pressure: Care Instructions. \" Current as of: September 21, 2016 Content Version: 11.4 © 4074-5935 [employer]. Care instructions adapted under license by [employer] (which disclaims liability or warranty for this information).  If you have questions about a medical condition or this instruction, always ask your healthcare professional. [employer] disclaims any warranty or liability for your use of this information. Introducing [facility]! [address] introduces DataArt patient portal. Now you can access parts of your medical record, email your doctor's office, and request medication refills online. 1. In your internet browser, go to [email or web address] 2. Click on the First Time User? Click Here link in the Sign In box. You will see the New Member Sign Up page. 3. Enter your DataArt Access Code exactly as it appears below. You will not need to use this code after youve completed the sign-up process. If you do not sign up before the expiration date, you must request a new code. · DataArt Access Code: [email or web address] Expires: 4/9/2018  1:01 PM 
 
4. Enter the last four digits of your Social Security Number (xxxx) and Date of Birth (mm/dd/yyyy) as indicated and click Submit. You will be taken to the next sign-up page. 5. Create a DataArt ID. This will be your DataArt login ID and cannot be changed, so think of one that is secure and easy to remember. 6. Create a DataArt password. You can change your password at any time. 7. Enter your Password Reset Question and Answer. This can be used at a later time if you forget your password. 8. Enter your e-mail address. You will receive e-mail notification when new information is available in [address] 9. Click Sign Up. You can now view and download portions of your medical record. 10. Click the Download Summary menu link to download a portable copy of your medical information. If you have questions, please visit the Frequently Asked Questions section of the DataArt website. Remember, DataArt is NOT to be used for urgent needs. For medical emergencies, dial 911. Now available from your iPhone and Android! Please provide this summary of care documentation to your next provider. Your primary care clinician is listed as Regi Harmon. If you have any questions after today's visit, please call [phone].

## 2018-02-26 NOTE — PATIENT INSTRUCTIONS
High Blood Pressure: Care Instructions  Your Care Instructions    If your blood pressure is usually above 140/90, you have high blood pressure, or hypertension. That means the top number is 140 or higher or the bottom number is 90 or higher, or both. Despite what a lot of people think, high blood pressure usually doesn't cause headaches or make you feel dizzy or lightheaded. It usually has no symptoms. But it does increase your risk for heart attack, stroke, and kidney or eye damage. The higher your blood pressure, the more your risk increases. Your doctor will give you a goal for your blood pressure. Your goal will be based on your health and your age. An example of a goal is to keep your blood pressure below 140/90. Lifestyle changes, such as eating healthy and being active, are always important to help lower blood pressure. You might also take medicine to reach your blood pressure goal.  Follow-up care is a key part of your treatment and safety. Be sure to make and go to all appointments, and call your doctor if you are having problems. It's also a good idea to know your test results and keep a list of the medicines you take. How can you care for yourself at home? Medical treatment  · If you stop taking your medicine, your blood pressure will go back up. You may take one or more types of medicine to lower your blood pressure. Be safe with medicines. Take your medicine exactly as prescribed. Call your doctor if you think you are having a problem with your medicine. · Talk to your doctor before you start taking aspirin every day. Aspirin can help certain people lower their risk of a heart attack or stroke. But taking aspirin isn't right for everyone, because it can cause serious bleeding. · See your doctor regularly. You may need to see the doctor more often at first or until your blood pressure comes down.   · If you are taking blood pressure medicine, talk to your doctor before you take decongestants or anti-inflammatory medicine, such as ibuprofen. Some of these medicines can raise blood pressure. · Learn how to check your blood pressure at home. Lifestyle changes  · Stay at a healthy weight. This is especially important if you put on weight around the waist. Losing even 10 pounds can help you lower your blood pressure. · If your doctor recommends it, get more exercise. Walking is a good choice. Bit by bit, increase the amount you walk every day. Try for at least 30 minutes on most days of the week. You also may want to swim, bike, or do other activities. · Avoid or limit alcohol. Talk to your doctor about whether you can drink any alcohol. · Try to limit how much sodium you eat to less than 2,300 milligrams (mg) a day. Your doctor may ask you to try to eat less than 1,500 mg a day. · Eat plenty of fruits (such as bananas and oranges), vegetables, legumes, whole grains, and low-fat dairy products. · Lower the amount of saturated fat in your diet. Saturated fat is found in animal products such as milk, cheese, and meat. Limiting these foods may help you lose weight and also lower your risk for heart disease. · Do not smoke. Smoking increases your risk for heart attack and stroke. If you need help quitting, talk to your doctor about stop-smoking programs and medicines. These can increase your chances of quitting for good. When should you call for help? Call 911 anytime you think you may need emergency care. This may mean having symptoms that suggest that your blood pressure is causing a serious heart or blood vessel problem. Your blood pressure may be over 180/110. ? For example, call 911 if:  ? · You have symptoms of a heart attack. These may include:  ¨ Chest pain or pressure, or a strange feeling in the chest.  ¨ Sweating. ¨ Shortness of breath. ¨ Nausea or vomiting.   ¨ Pain, pressure, or a strange feeling in the back, neck, jaw, or upper belly or in one or both shoulders or arms.  ¨ Lightheadedness or sudden weakness. ¨ A fast or irregular heartbeat. ? · You have symptoms of a stroke. These may include:  ¨ Sudden numbness, tingling, weakness, or loss of movement in your face, arm, or leg, especially on only one side of your body. ¨ Sudden vision changes. ¨ Sudden trouble speaking. ¨ Sudden confusion or trouble understanding simple statements. ¨ Sudden problems with walking or balance. ¨ A sudden, severe headache that is different from past headaches. ? · You have severe back or belly pain. ?Do not wait until your blood pressure comes down on its own. Get help right away. ?Call your doctor now or seek immediate care if:  ? · Your blood pressure is much higher than normal (such as 180/110 or higher), but you don't have symptoms. ? · You think high blood pressure is causing symptoms, such as:  ¨ Severe headache. ¨ Blurry vision. ? Watch closely for changes in your health, and be sure to contact your doctor if:  ? · Your blood pressure measures 140/90 or higher at least 2 times. That means the top number is 140 or higher or the bottom number is 90 or higher, or both. ? · You think you may be having side effects from your blood pressure medicine. ? · Your blood pressure is usually normal, but it goes above normal at least 2 times. Where can you learn more? Go to http://kosta-angle.info/. Enter T429 in the search box to learn more about \"High Blood Pressure: Care Instructions. \"  Current as of: September 21, 2016  Content Version: 11.4  © 4311-3775 [employer]. Care instructions adapted under license by [employer] (which disclaims liability or warranty for this information). If you have questions about a medical condition or this instruction, always ask your healthcare professional. [place] [address] any warranty or liability for your use of this information.

## 2018-02-27 LAB
25(OH)D3+25(OH)D2 SERPL-MCNC: 52.6 NG/ML (ref 30–100)
ALBUMIN SERPL-MCNC: 4.3 G/DL (ref 3.5–4.7)
ALBUMIN/GLOB SERPL: 1.6 {RATIO} (ref 1.2–2.2)
ALP SERPL-CCNC: 95 IU/L (ref 39–117)
ALT SERPL-CCNC: 16 IU/L (ref 0–32)
AST SERPL-CCNC: 17 IU/L (ref 0–40)
BASOPHILS # BLD AUTO: 0 X10E3/UL (ref 0–0.2)
BASOPHILS NFR BLD AUTO: 1 %
BILIRUB SERPL-MCNC: 0.4 MG/DL (ref 0–1.2)
BUN SERPL-MCNC: 16 MG/DL (ref 8–27)
BUN/CREAT SERPL: 15 (ref 12–28)
CALCIUM SERPL-MCNC: 9.1 MG/DL (ref 8.7–10.3)
CHLORIDE SERPL-SCNC: 102 MMOL/L (ref 96–106)
CHOLEST SERPL-MCNC: 136 MG/DL (ref 100–199)
CO2 SERPL-SCNC: 22 MMOL/L (ref 18–29)
CREAT SERPL-MCNC: 1.08 MG/DL (ref 0.57–1)
EOSINOPHIL # BLD AUTO: 0.1 X10E3/UL (ref 0–0.4)
EOSINOPHIL NFR BLD AUTO: 2 %
ERYTHROCYTE [DISTWIDTH] IN BLOOD BY AUTOMATED COUNT: 15.7 % (ref 12.3–15.4)
GFR SERPLBLD CREATININE-BSD FMLA CKD-EPI: 46 ML/MIN/1.73
GFR SERPLBLD CREATININE-BSD FMLA CKD-EPI: 53 ML/MIN/1.73
GLOBULIN SER CALC-MCNC: 2.7 G/DL (ref 1.5–4.5)
GLUCOSE SERPL-MCNC: 85 MG/DL (ref 65–99)
HCT VFR BLD AUTO: 37.4 % (ref 34–46.6)
HDLC SERPL-MCNC: 53 MG/DL
HGB BLD-MCNC: 12.1 G/DL (ref 11.1–15.9)
IMM GRANULOCYTES # BLD: 0 X10E3/UL (ref 0–0.1)
IMM GRANULOCYTES NFR BLD: 0 %
INTERPRETATION, 910389: NORMAL
INTERPRETATION: NORMAL
LDLC SERPL CALC-MCNC: 67 MG/DL (ref 0–99)
LYMPHOCYTES # BLD AUTO: 1.6 X10E3/UL (ref 0.7–3.1)
LYMPHOCYTES NFR BLD AUTO: 33 %
MCH RBC QN AUTO: 26.2 PG (ref 26.6–33)
MCHC RBC AUTO-ENTMCNC: 32.4 G/DL (ref 31.5–35.7)
MCV RBC AUTO: 81 FL (ref 79–97)
MONOCYTES # BLD AUTO: 0.4 X10E3/UL (ref 0.1–0.9)
MONOCYTES NFR BLD AUTO: 8 %
NEUTROPHILS # BLD AUTO: 2.7 X10E3/UL (ref 1.4–7)
NEUTROPHILS NFR BLD AUTO: 56 %
PDF IMAGE, 910387: NORMAL
PLATELET # BLD AUTO: 302 X10E3/UL (ref 150–379)
POTASSIUM SERPL-SCNC: 4.8 MMOL/L (ref 3.5–5.2)
PROT SERPL-MCNC: 7 G/DL (ref 6–8.5)
RBC # BLD AUTO: 4.61 X10E6/UL (ref 3.77–5.28)
SODIUM SERPL-SCNC: 142 MMOL/L (ref 134–144)
TRIGL SERPL-MCNC: 78 MG/DL (ref 0–149)
VLDLC SERPL CALC-MCNC: 16 MG/DL (ref 5–40)
WBC # BLD AUTO: 4.7 X10E3/UL (ref 3.4–10.8)

## 2018-02-27 NOTE — PROGRESS NOTES
pls let patient know labs normal including Vit d and cholesterol-no need to refill rx for vit D, she can take OTC Ca+D daily

## 2018-03-05 ENCOUNTER — TELEPHONE (OUTPATIENT)
Dept: FAMILY MEDICINE CLINIC | Age: 83
End: 2018-03-05

## 2018-03-05 NOTE — TELEPHONE ENCOUNTER
----- Message from Michael Bush MD sent at 2/27/2018  2:04 PM EST -----  pls let patient know labs normal including Vit d and cholesterol-no need to refill rx for vit D, she can take OTC Ca+D daily

## 2018-03-22 ENCOUNTER — OFFICE VISIT (OUTPATIENT)
Dept: FAMILY MEDICINE CLINIC | Age: 83
End: 2018-03-22

## 2018-03-22 VITALS
TEMPERATURE: 99.5 F | HEIGHT: 61 IN | RESPIRATION RATE: 16 BRPM | WEIGHT: 139.7 LBS | DIASTOLIC BLOOD PRESSURE: 94 MMHG | SYSTOLIC BLOOD PRESSURE: 149 MMHG | BODY MASS INDEX: 26.38 KG/M2 | HEART RATE: 89 BPM | OXYGEN SATURATION: 96 %

## 2018-03-22 DIAGNOSIS — R05.9 COUGH: ICD-10-CM

## 2018-03-22 DIAGNOSIS — R09.82 POST-NASAL DRIP: Primary | ICD-10-CM

## 2018-03-22 RX ORDER — CODEINE PHOSPHATE AND GUAIFENESIN 10; 100 MG/5ML; MG/5ML
SOLUTION ORAL
Qty: 120 ML | Refills: 0 | Status: SHIPPED | OUTPATIENT
Start: 2018-03-22 | End: 2018-05-11 | Stop reason: ALTCHOICE

## 2018-03-22 RX ORDER — AZELASTINE HCL 205.5 UG/1
2 SPRAY NASAL 2 TIMES DAILY
Qty: 1 BOTTLE | Refills: 0 | Status: SHIPPED | OUTPATIENT
Start: 2018-03-22 | End: 2018-05-15

## 2018-03-22 NOTE — PROGRESS NOTES
1. Have you been to the ER, urgent care clinic since your last visit? Hospitalized since your last visit? No    2. Have you seen or consulted any other health care providers outside of the [address] [place] [address] since your last visit? Include any pap smears or colon screening. No      Patient here today for cough and shortness of breath for 1 day.

## 2018-03-22 NOTE — PROGRESS NOTES
Weakness, fatigue, cough shortness of breath  Subjective: Varun Anderson is a 80 y.o. female here for an acute visit for    Chief Complaint   Patient presents with    Cough     coughing with shortness of breath times 1 day. HPI      Onset[de-identified] yesterday  Location: upper resp  Duration: constant  Characteristics: using cane, wobbles, cough,   Aggravating: nothing  Reliving: nothing  Treatment: otc cough medicine, sudafed  Pertinent PMH: diabetes  Pertinent negatives: air hunger, falls, fever, nausea, vomiting, SOB, chest pain/pressure      ROS  As above, the rest are negative    Current Outpatient Prescriptions   Medication Sig Dispense Refill    Azelastine (ASTEPRO) 0.15 % (205.5 mcg) nasal spray 2 Sprays by Both Nostrils route two (2) times a day. 1 Bottle 0    guaiFENesin-codeine (ROBITUSSIN AC) 100-10 mg/5 mL solution Take 1/2 tsp up to tid prn cough 120 mL 0    losartan (COZAAR) 25 mg tablet Take 1 Tab by mouth daily. 90 Tab 1    atorvastatin (LIPITOR) 10 mg tablet Take 1 Tab by mouth daily. 90 Tab 3    ergocalciferol (ERGOCALCIFEROL) 50,000 unit capsule Take 1 Cap by mouth every seven (7) days. 12 Cap 1    acetaminophen (TYLENOL) 325 mg tablet Take  by mouth every four (4) hours as needed for Pain.  FREESTYLE TEST strip PATIENT TESTING BLOOD SUGAR ONCE DAILY 1 Package 10    FREESTYLE LANCETS 28 gauge misc DAILY USE 1 Package 10    aspirin (ASPIRIN LOW-STRENGTH) 81 mg chewable tablet CHEW AND SWALLOW ONE TABLET BY MOUTH DAILY 90 Tab 1        Patient Active Problem List   Diagnosis Code    Essential hypertension I10    Osteoporosis without current pathological fracture M81.0    Hyperlipidemia E78.5    Primary osteoarthritis of both knees M17.0    Vitamin D deficiency E55.9    Advanced directives, counseling/discussion Z71.89    Overweight (BMI 25.0-29. 9) E66.3    Type 2 diabetes mellitus with hyperglycemia, without long-term current use of insulin (HCC) E11.65       No Known Allergies  Family History   Problem Relation Age of Onset    Kidney Disease Mother     Hypertension Sister     Kidney Disease Brother        Objective:     Vitals:    03/22/18 1636   BP: (!) 149/94   Pulse: 89   Resp: 16   Temp: 99.5 °F (37.5 °C)   TempSrc: Oral   SpO2: 96%   Weight: 139 lb 11.2 oz (63.4 kg)   Height: 5' 1\" (1.549 m)     Body mass index is 26.4 kg/(m^2). Appearance: alert, well appearing, and in no distress and oriented to person, place, and time. ENT normal.  Neck supple. No adenopathy or thyromegaly. PERRLA. Lungs are clear, good air entry, no wheezes, rhonchi or rales. S1 and S2 normal, no murmurs, regular rate and rhythm. Neurological is normal, no focal findings. Labwork and Ancillary Studies:    CBC w/Diff  Lab Results   Component Value Date/Time    WBC 4.7 02/26/2018 12:00 AM    WBC 4.5 05/24/2012 11:42 AM    HGB 12.1 02/26/2018 12:00 AM    PLATELET 153 21/06/3197 12:00 AM         Basic Metabolic Profile  Lab Results   Component Value Date/Time    Sodium 142 02/26/2018 12:00 AM    Potassium 4.8 02/26/2018 12:00 AM    Chloride 102 02/26/2018 12:00 AM    CO2 22 02/26/2018 12:00 AM    Anion gap 9 09/23/2010 01:08 PM    Glucose 85 02/26/2018 12:00 AM    BUN 16 02/26/2018 12:00 AM    Creatinine 1.08 (H) 02/26/2018 12:00 AM    BUN/Creatinine ratio 15 02/26/2018 12:00 AM    GFR est AA 53 (L) 02/26/2018 12:00 AM    GFR est non-AA 46 (L) 02/26/2018 12:00 AM    Calcium 9.1 02/26/2018 12:00 AM        Cholesterol  Lab Results   Component Value Date/Time    Cholesterol, total 136 02/26/2018 12:00 AM    HDL Cholesterol 53 02/26/2018 12:00 AM    LDL, calculated 67 02/26/2018 12:00 AM    Triglyceride 78 02/26/2018 12:00 AM    CHOL/HDL Ratio 3.8 09/23/2010 01:08 PM          Assessment/Plan:    Diagnoses and all orders for this visit:    1. Post-nasal drip  -     Azelastine (ASTEPRO) 0.15 % (205.5 mcg) nasal spray; 2 Sprays by Both Nostrils route two (2) times a day.     2. Cough  - guaiFENesin-codeine (ROBITUSSIN AC) 100-10 mg/5 mL solution; Take 1/2 tsp up to tid prn cough    Mrs. Pringle s/s point towards post nasal drip. OTC recommendations given in addition to RX. Return to clinic if sxs persist, to ER if sxs worsen    Patient verbalized understanding to above instructions. AVS printed and given to pt. Follow-up Disposition:  Return if symptoms worsen or fail to improve. Ruthy Shore, [facility]  [id number] [facility] [address]  [id number]

## 2018-03-22 NOTE — MR AVS SNAPSHOT
Celeste Barclay [place] [address] [place] [id number] 
[phone] Patient: Alba Pringle MRN: [id number] Visit Information Date & Time Provider Department Dept. Phone Encounter #  
 3/22/2018  4:30 PM Matias Blackburnsinjoe 13 [id number] Follow-up Instructions Return if symptoms worsen or fail to improve. Your Appointments 5/29/2018  9:00 AM  
Follow Up with MD Katherine Maguire 23 ([address]) Appt Note: 3 mo f/u  
 [facility] [place]. [address]  
  
   
 [address] [facility]  
  
    
 10/4/2018  9:15 AM  
Office Visit with MD Katherine Maguire 23 ([address]) Appt Note: [address] [place] [address] 68 [facility] [place]. [address]  
  
   
 [address] [place] Upcoming Health Maintenance Date Due DTaP/Tdap/Td series (1 - Tdap) 1/12/2013 Pneumococcal 65+ Low/Medium Risk (2 of 2 - PPSV23) 8/19/2016 MICROALBUMIN Q1 5/25/2018 FOOT EXAM Q1 6/14/2018 HEMOGLOBIN A1C Q6M 7/9/2018 EYE EXAM RETINAL OR DILATED Q1 9/7/2018 MEDICARE YEARLY EXAM 10/6/2018 LIPID PANEL Q1 2/26/2019 GLAUCOMA SCREENING Q2Y 9/7/2019 Allergies as of 3/22/2018  Review Complete On: 3/22/2018 By: Vidhya Beth No Known Allergies Current Immunizations  Reviewed on 2/26/2018 Name Date Influenza High Dose Vaccine PF 10/31/2017, 11/1/2016 Influenza Vaccine 11/4/2014  9:37 AM, 10/10/2013 Influenza Vaccine (Quad) 11/3/2015  9:58 AM  
 Influenza Vaccine Split 10/18/2012 11:44 AM, 9/29/2011, 3/1/2011, 3/1/2011, 10/6/2010 Pneumococcal Conjugate (PCV-13) 8/19/2015  2:46 PM  
 Td 1/11/2013 12:00 AM  
 Zoster Vaccine, Live 1/1/2016 Not reviewed this visit You Were Diagnosed With   
  
 Codes Comments Post-nasal drip    -  Primary ICD-10-CM: R09.82 ICD-9-CM: 784.91 Cough     ICD-10-CM: R05 ICD-9-CM: 621. 2 Vitals BP Pulse Temp Resp Height(growth percentile) Weight(growth percentile) (!) 149/94 (BP 1 Location: Left arm, BP Patient Position: Sitting) 89 99.5 °F (37.5 °C) (Oral) 16 5' 1\" (1.549 m) 139 lb 11.2 oz (63.4 kg) SpO2 BMI OB Status Smoking Status 96% 26.4 kg/m2 Hysterectomy Never Smoker BMI and BSA Data Body Mass Index Body Surface Area  
 26.4 kg/m 2 1.65 m 2 Preferred Pharmacy Pharmacy Name Phone [facility], [address] [phone] Your Updated Medication List  
  
   
This list is accurate as of 3/22/18  4:57 PM.  Always use your most recent med list.  
  
  
  
  
 aspirin 81 mg chewable tablet Commonly known as:  ASPIRIN LOW-STRENGTH  
CHEW AND SWALLOW ONE TABLET BY MOUTH DAILY  
  
 atorvastatin 10 mg tablet Commonly known as:  LIPITOR Take 1 Tab by mouth daily. Azelastine 0.15 % (205.5 mcg) nasal spray Commonly known as:  ASTEPRO  
2 Sprays by Both Nostrils route two (2) times a day. ergocalciferol 50,000 unit capsule Commonly known as:  ERGOCALCIFEROL Take 1 Cap by mouth every seven (7) days. FREESTYLE LANCETS 28 gauge Misc Generic drug:  lancets DAILY USE FREESTYLE TEST strip Generic drug:  glucose blood VI test strips PATIENT TESTING BLOOD SUGAR ONCE DAILY  
  
 guaiFENesin-codeine 100-10 mg/5 mL solution Commonly known as:  ROBITUSSIN AC Take 1/2 tsp up to tid prn cough  
  
 losartan 25 mg tablet Commonly known as:  COZAAR Take 1 Tab by mouth daily. TYLENOL 325 mg tablet Generic drug:  acetaminophen Take  by mouth every four (4) hours as needed for Pain. Prescriptions Printed Refills  
 guaiFENesin-codeine (ROBITUSSIN AC) 100-10 mg/5 mL solution 0 Sig: Take 1/2 tsp up to tid prn cough Class: Print Prescriptions Sent to Pharmacy Refills Azelastine (ASTEPRO) 0.15 % (205.5 mcg) nasal spray 0 Si Sprays by Both Nostrils route two (2) times a day. Class: Normal  
 Pharmacy: [facility] [id number] [address] 1008 [facility] #: [phone] Route: Both Nostrils Follow-up Instructions Return if symptoms worsen or fail to improve. Patient Instructions   
 
 zyrtec as directed 
flonase as directed Sinusitis: Care Instructions Your Care Instructions Sinusitis is an infection of the lining of the sinus cavities in your head. Sinusitis often follows a cold. It causes pain and pressure in your head and face. In most cases, sinusitis gets better on its own in 1 to 2 weeks. But some mild symptoms may last for several weeks. Sometimes antibiotics are needed. Follow-up care is a key part of your treatment and safety. Be sure to make and go to all appointments, and call your doctor if you are having problems. It's also a good idea to know your test results and keep a list of the medicines you take. How can you care for yourself at home? · Take an over-the-counter pain medicine, such as acetaminophen (Tylenol), ibuprofen (Advil, Motrin), or naproxen (Aleve). Read and follow all instructions on the label. · If the doctor prescribed antibiotics, take them as directed. Do not stop taking them just because you feel better. You need to take the full course of antibiotics. · Be careful when taking over-the-counter cold or flu medicines and Tylenol at the same time. Many of these medicines have acetaminophen, which is Tylenol. Read the labels to make sure that you are not taking more than the recommended dose. Too much acetaminophen (Tylenol) can be harmful. · Breathe warm, moist air from a steamy shower, a hot bath, or a sink filled with hot water. Avoid cold, dry air.  Using a humidifier in your home may help. Follow the directions for cleaning the machine. · Use saline (saltwater) nasal washes to help keep your nasal passages open and wash out mucus and bacteria. You can buy saline nose drops at a grocery store or drugstore. Or you can make your own at home by adding 1 teaspoon of salt and 1 teaspoon of baking soda to 2 cups of distilled water. If you make your own, fill a bulb syringe with the solution, insert the tip into your nostril, and squeeze gently. Lonza Muff your nose. · Put a hot, wet towel or a warm gel pack on your face 3 or 4 times a day for 5 to 10 minutes each time. · Try a decongestant nasal spray like oxymetazoline (Afrin). Do not use it for more than 3 days in a row. Using it for more than 3 days can make your congestion worse. When should you call for help? Call your doctor now or seek immediate medical care if: 
? · You have new or worse swelling or redness in your face or around your eyes. ? · You have a new or higher fever. ? Watch closely for changes in your health, and be sure to contact your doctor if: 
? · You have new or worse facial pain. ? · The mucus from your nose becomes thicker (like pus) or has new blood in it. ? · You are not getting better as expected. Where can you learn more? Go to http://kosta-angle.info/. Enter T632 in the search box to learn more about \"Sinusitis: Care Instructions. \" Current as of: May 12, 2017 Content Version: 11.4 © 0429-1132 [employer]. Care instructions adapted under license by [employer] (which disclaims liability or warranty for this information). If you have questions about a medical condition or this instruction, always ask your healthcare professional. [address] any warranty or liability for your use of this information. Introducing [facility]!    
 [facility] introduces BiondVax patient portal. Now you can access parts of your medical record, email your doctor's office, and request medication refills online. 1. In your internet browser, go to [email or web address] 2. Click on the First Time User? Click Here link in the Sign In box. You will see the New Member Sign Up page. 3. Enter your Hairdressr Access Code exactly as it appears below. You will not need to use this code after youve completed the sign-up process. If you do not sign up before the expiration date, you must request a new code. · Hairdressr Access Code: [email or web address] Expires: 4/9/2018  2:01 PM 
 
4. Enter the last four digits of your Social Security Number (xxxx) and Date of Birth (mm/dd/yyyy) as indicated and click Submit. You will be taken to the next sign-up page. 5. Create a Hairdressr ID. This will be your Hairdressr login ID and cannot be changed, so think of one that is secure and easy to remember. 6. Create a Hairdressr password. You can change your password at any time. 7. Enter your Password Reset Question and Answer. This can be used at a later time if you forget your password. 8. Enter your e-mail address. You will receive e-mail notification when new information is available in [address] 9. Click Sign Up. You can now view and download portions of your medical record. 10. Click the Download Summary menu link to download a portable copy of your medical information. If you have questions, please visit the Frequently Asked Questions section of the Hairdressr website. Remember, Hairdressr is NOT to be used for urgent needs. For medical emergencies, dial 911. Now available from your iPhone and Android! Please provide this summary of care documentation to your next provider. Your primary care clinician is listed as Shay Capellan. If you have any questions after today's visit, please call [phone].

## 2018-03-22 NOTE — PATIENT INSTRUCTIONS
zyrtec as directed  flonase as directed  Sinusitis: Care Instructions  Your Care Instructions    Sinusitis is an infection of the lining of the sinus cavities in your head. Sinusitis often follows a cold. It causes pain and pressure in your head and face. In most cases, sinusitis gets better on its own in 1 to 2 weeks. But some mild symptoms may last for several weeks. Sometimes antibiotics are needed. Follow-up care is a key part of your treatment and safety. Be sure to make and go to all appointments, and call your doctor if you are having problems. It's also a good idea to know your test results and keep a list of the medicines you take. How can you care for yourself at home? · Take an over-the-counter pain medicine, such as acetaminophen (Tylenol), ibuprofen (Advil, Motrin), or naproxen (Aleve). Read and follow all instructions on the label. · If the doctor prescribed antibiotics, take them as directed. Do not stop taking them just because you feel better. You need to take the full course of antibiotics. · Be careful when taking over-the-counter cold or flu medicines and Tylenol at the same time. Many of these medicines have acetaminophen, which is Tylenol. Read the labels to make sure that you are not taking more than the recommended dose. Too much acetaminophen (Tylenol) can be harmful. · Breathe warm, moist air from a steamy shower, a hot bath, or a sink filled with hot water. Avoid cold, dry air. Using a humidifier in your home may help. Follow the directions for cleaning the machine. · Use saline (saltwater) nasal washes to help keep your nasal passages open and wash out mucus and bacteria. You can buy saline nose drops at a grocery store or drugstore. Or you can make your own at home by adding 1 teaspoon of salt and 1 teaspoon of baking soda to 2 cups of distilled water. If you make your own, fill a bulb syringe with the solution, insert the tip into your nostril, and squeeze gently.  Amy Clap your nose.  · Put a hot, wet towel or a warm gel pack on your face 3 or 4 times a day for 5 to 10 minutes each time. · Try a decongestant nasal spray like oxymetazoline (Afrin). Do not use it for more than 3 days in a row. Using it for more than 3 days can make your congestion worse. When should you call for help? Call your doctor now or seek immediate medical care if:  ? · You have new or worse swelling or redness in your face or around your eyes. ? · You have a new or higher fever. ? Watch closely for changes in your health, and be sure to contact your doctor if:  ? · You have new or worse facial pain. ? · The mucus from your nose becomes thicker (like pus) or has new blood in it. ? · You are not getting better as expected. Where can you learn more? Go to http://kosta-angle.info/. Enter I304 in the search box to learn more about \"Sinusitis: Care Instructions. \"  Current as of: May 12, 2017  Content Version: 11.4  © 1981-9141 [employer]. Care instructions adapted under license by [employer] (which disclaims liability or warranty for this information). If you have questions about a medical condition or this instruction, always ask your healthcare professional. [address] any warranty or liability for your use of this information.

## 2018-03-23 DIAGNOSIS — E55.9 VITAMIN D DEFICIENCY: ICD-10-CM

## 2018-03-26 RX ORDER — ERGOCALCIFEROL 1.25 MG/1
50000 CAPSULE ORAL
Qty: 12 CAP | Refills: 1 | Status: SHIPPED | OUTPATIENT
Start: 2018-03-26 | End: 2019-01-08 | Stop reason: ALTCHOICE

## 2018-05-11 ENCOUNTER — OFFICE VISIT (OUTPATIENT)
Dept: FAMILY MEDICINE CLINIC | Age: 83
End: 2018-05-11

## 2018-05-11 VITALS
TEMPERATURE: 96.7 F | HEART RATE: 74 BPM | WEIGHT: 139.6 LBS | RESPIRATION RATE: 16 BRPM | BODY MASS INDEX: 26.36 KG/M2 | DIASTOLIC BLOOD PRESSURE: 107 MMHG | SYSTOLIC BLOOD PRESSURE: 182 MMHG | HEIGHT: 61 IN
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
## 2018-05-11 DIAGNOSIS — R42 DIZZINESS: ICD-10-CM

## 2018-05-11 DIAGNOSIS — M25.40 SWOLLEN JOINT: ICD-10-CM

## 2018-05-11 NOTE — PROGRESS NOTES
Subjective: Leopoldo Fair is a 80 y.o. female here for an acute visit for    Chief Complaint   Patient presents with    Fall     Bruised right hip       HPI    Onset Last night   Location Right hip   Duration    Characteristics Getting out bed, standing up and next thing I knew I was on the floor, felt usual dizziness, sat on the floor for a few minutes and got up, this morning too dizzy to get up out of bed     Aggrevating    Relieving    Treatment    Pertinent PMH Hypertension, DM   Pertinent negatives LOC, chest pain/pressure         ROS  As above, the rest are negative    Current Outpatient Prescriptions   Medication Sig Dispense Refill    ergocalciferol (ERGOCALCIFEROL) 50,000 unit capsule Take 1 Cap by mouth every seven (7) days. 12 Cap 1    Azelastine (ASTEPRO) 0.15 % (205.5 mcg) nasal spray 2 Sprays by Both Nostrils route two (2) times a day. 1 Bottle 0    losartan (COZAAR) 25 mg tablet Take 1 Tab by mouth daily. 90 Tab 1    atorvastatin (LIPITOR) 10 mg tablet Take 1 Tab by mouth daily. 90 Tab 3    acetaminophen (TYLENOL) 325 mg tablet Take  by mouth every four (4) hours as needed for Pain.  aspirin (ASPIRIN LOW-STRENGTH) 81 mg chewable tablet CHEW AND SWALLOW ONE TABLET BY MOUTH DAILY 90 Tab 1    FREESTYLE TEST strip PATIENT TESTING BLOOD SUGAR ONCE DAILY 1 Package 10    FREESTYLE LANCETS 28 gauge misc DAILY USE 1 Package 10          Patient Active Problem List   Diagnosis Code    Essential hypertension I10    Osteoporosis without current pathological fracture M81.0    Hyperlipidemia E78.5    Primary osteoarthritis of both knees M17.0    Vitamin D deficiency E55.9    Advanced directives, counseling/discussion Z71.89    Overweight (BMI 25.0-29. 9) E66.3    Type 2 diabetes mellitus with hyperglycemia, without long-term current use of insulin (HCC) E11.65       No Known Allergies  Family History   Problem Relation Age of Onset    Kidney Disease Mother     Hypertension Sister     Kidney Disease Brother        Objective:     Vitals:    05/11/18 1100   BP: (!) 182/107   Pulse: 74   Resp: 16   Temp: 96.7 °F (35.9 °C)   TempSrc: Oral   Weight: 139 lb 9.6 oz (63.3 kg)   Height: 5' 1\" (1.549 m)     Body mass index is 26.38 kg/(m^2). Appearance: alert, well appearing, and in no distress. ENT normal.  Neck supple. No adenopathy or thyromegaly. PERRLA. Lungs are clear, good air entry, no wheezes, rhonchi or rales. S1 and S2 normal, no murmurs, regular rate and rhythm. Abdomen soft without tenderness, guarding, mass or organomegaly. No bruit. Extremities show no edema, normal peripheral pulses. Neurological is normal, no focal findings. Orthostatic blood pressures: sitting  172/96  Standing 180/98  Right hip with well demarcated, raised echymotic area approximately the size of a soft ball,  Surrounding tissue soft  Thumb on right hand malformed, she does not know how it happened, there is no pain, decreased ROM, no erythema or warmth.     Labwork and Ancillary Studies:    CBC w/Diff  Lab Results   Component Value Date/Time    WBC 4.7 02/26/2018 12:00 AM    WBC 4.5 05/24/2012 11:42 AM    HGB 12.1 02/26/2018 12:00 AM    PLATELET 683 76/62/8090 12:00 AM         Basic Metabolic Profile  Lab Results   Component Value Date/Time    Sodium 142 02/26/2018 12:00 AM    Potassium 4.8 02/26/2018 12:00 AM    Chloride 102 02/26/2018 12:00 AM    CO2 22 02/26/2018 12:00 AM    Anion gap 9 09/23/2010 01:08 PM    Glucose 85 02/26/2018 12:00 AM    BUN 16 02/26/2018 12:00 AM    Creatinine 1.08 (H) 02/26/2018 12:00 AM    BUN/Creatinine ratio 15 02/26/2018 12:00 AM    GFR est AA 53 (L) 02/26/2018 12:00 AM    GFR est non-AA 46 (L) 02/26/2018 12:00 AM    Calcium 9.1 02/26/2018 12:00 AM        Cholesterol  Lab Results   Component Value Date/Time    Cholesterol, total 136 02/26/2018 12:00 AM    HDL Cholesterol 53 02/26/2018 12:00 AM    LDL, calculated 67 02/26/2018 12:00 AM    Triglyceride 78 02/26/2018 12:00 AM CHOL/HDL Ratio 3.8 09/23/2010 01:08 PM          Assessment/Plan:    Diagnoses and all orders for this visit:    1. Fall, initial encounter    2. Swollen joint  -     REFERRAL TO ORTHOPEDICS  -Hand X-ray  -Left thumb mis aligned at the interphalangeal joint, per my reading, await radiologist results  -  3. Dizziness  -This has been a chronic ongoing problem for this patient. Instructed her to rise slowly and wait until the dizziness disappears between position changes. Return to clinic if sxs persist, to ER if sxs worsen    Patient verbalized understanding to above instructions. AVS printed and given to pt. Follow-up Disposition:  Return if symptoms worsen or fail to improve. Gema Smith, [facility]  [facility]   [address] [facility] [address]  [id number]

## 2018-05-11 NOTE — PROGRESS NOTES
1. Have you been to the ER, urgent care clinic since your last visit? Hospitalized since your last visit? No.     2. Have you seen or consulted any other health care providers outside of the [address] [place] [address] since your last visit? Include any pap smears or colon screening. No.     Chief Complaint   Patient presents with   [address] right hip   Patient fell off her bed yesterday and hurt her right hip. She can tolerate the pain but she just wanted to get it check. Took OTC tylenol.

## 2018-05-11 NOTE — MR AVS SNAPSHOT
Celeste Barclay [place] [address] [id number] 
[phone] Patient: Alba Pringle MRN: [id number] BAN:[id number] Visit Information Date & Time Provider Department Dept. Phone Encounter #  
 5/11/2018 11:15 AM Ashia Martinez, 10[facility] [phone] [id number] Follow-up Instructions Return if symptoms worsen or fail to improve. Your Appointments 5/29/2018  9:00 AM  
Follow Up with MD Katherine Simons 23 ([address]) Appt Note: 3 mo f/u  
 [facility] [address]  
  
   
 [address] [facility]  
  
    
 10/4/2018  9:15 AM  
Office Visit with MD Katherine Simons 23 ([address] [facility]) Appt Note: 295 [facility] 68 [facility] [address]  
  
   
 [address] [place] [address] [address] Upcoming Health Maintenance Date Due DTaP/Tdap/Td series (1 - Tdap) 1/12/2013 Pneumococcal 65+ Low/Medium Risk (2 of 2 - PPSV23) 8/19/2016 MICROALBUMIN Q1 5/25/2018 FOOT EXAM Q1 6/14/2018 HEMOGLOBIN A1C Q6M 7/9/2018 Influenza Age 5 to Adult 8/1/2018 MEDICARE YEARLY EXAM 10/6/2018 EYE EXAM RETINAL OR DILATED Q1 2/8/2019 LIPID PANEL Q1 2/26/2019 GLAUCOMA SCREENING Q2Y 2/8/2020 Allergies as of 5/11/2018  Review Complete On: 3/23/2018 By: Ashia Martinez NP No Known Allergies Current Immunizations  Reviewed on 2/26/2018 Name Date Influenza High Dose Vaccine PF 10/31/2017, 11/1/2016 Influenza Vaccine 11/4/2014  9:37 AM, 10/10/2013 Influenza Vaccine (Quad) 11/3/2015  9:58 AM  
 Influenza Vaccine Split 10/18/2012 11:44 AM, 9/29/2011, 3/1/2011, 3/1/2011, 10/6/2010 Pneumococcal Conjugate (PCV-13) 8/19/2015  2:46 PM  
 Td 1/11/2013 12:00 AM  
 Zoster Vaccine, Live 1/1/2016 Not reviewed this visit You Were Diagnosed With   
  
 Codes Comments Fall, initial encounter    -  Primary ICD-10-CM: W19. Phuong Redmond ICD-9-CM: [id number]. 9 Swollen joint     ICD-10-CM: M25.40 ICD-9-CM: 719.00 Dizziness     ICD-10-CM: F78 ICD-9-CM: 780.4 Vitals BP Pulse Temp Resp Height(growth percentile) Weight(growth percentile) (!) 182/107 74 96.7 °F (35.9 °C) (Oral) 16 5' 1\" (1.549 m) 139 lb 9.6 oz (63.3 kg) BMI OB Status Smoking Status 26.38 kg/m2 Hysterectomy Never Smoker Vitals History BMI and BSA Data Body Mass Index Body Surface Area  
 26.38 kg/m 2 1.65 m 2 Preferred Pharmacy Pharmacy Name Phone [facility], [facility] [phone] Your Updated Medication List  
  
   
This list is accurate as of 5/11/18 11:52 AM.  Always use your most recent med list.  
  
  
  
  
 aspirin 81 mg chewable tablet Commonly known as:  ASPIRIN LOW-STRENGTH  
CHEW AND SWALLOW ONE TABLET BY MOUTH DAILY  
  
 atorvastatin 10 mg tablet Commonly known as:  LIPITOR Take 1 Tab by mouth daily. Azelastine 0.15 % (205.5 mcg) nasal spray Commonly known as:  ASTEPRO  
2 Sprays by Both Nostrils route two (2) times a day. ergocalciferol 50,000 unit capsule Commonly known as:  ERGOCALCIFEROL Take 1 Cap by mouth every seven (7) days. FREESTYLE LANCETS 28 gauge Misc Generic drug:  lancets DAILY USE FREESTYLE TEST strip Generic drug:  glucose blood VI test strips PATIENT TESTING BLOOD SUGAR ONCE DAILY losartan 25 mg tablet Commonly known as:  COZAAR Take 1 Tab by mouth daily. TYLENOL 325 mg tablet Generic drug:  acetaminophen Take  by mouth every four (4) hours as needed for Pain. We Performed the Following REFERRAL TO ORTHOPEDICS [VHC535 Custom] Comments:  
 Jammed thumb on right hand x-rays available Follow-up Instructions Return if symptoms worsen or fail to improve. Referral Information Referral ID Referred By Referred To  
  
 [id number] Tati PIRES Not Available Visits Status Start Date End Date 1 New Request 5/11/18 5/11/19 If your referral has a status of pending review or denied, additional information will be sent to support the outcome of this decision. Patient Instructions Arise slowly out of bed in the morning and any other time. Sit on the side of the bed until dizzyness goes away Stay hydrated. Dizziness: Care Instructions Your Care Instructions Dizziness is the feeling of unsteadiness or fuzziness in your head. It is different than having vertigo, which is a feeling that the room is spinning or that you are moving or falling. It is also different from lightheadedness, which is the feeling that you are about to faint. It can be hard to know what causes dizziness. Some people feel dizzy when they have migraine headaches. Sometimes bouts of flu can make you feel dizzy. Some medical conditions, such as heart problems or high blood pressure, can make you feel dizzy. Many medicines can cause dizziness, including medicines for high blood pressure, pain, or anxiety. If a medicine causes your symptoms, your doctor may recommend that you stop or change the medicine. If it is a problem with your heart, you may need medicine to help your heart work better. If there is no clear reason for your symptoms, your doctor may suggest watching and waiting for a while to see if the dizziness goes away on its own. Follow-up care is a key part of your treatment and safety. Be sure to make and go to all appointments, and call your doctor if you are having problems. It's also a good idea to know your test results and keep a list of the medicines you take. How can you care for yourself at home?  
· If your doctor recommends or prescribes medicine, take it exactly as directed. Call your doctor if you think you are having a problem with your medicine. · Do not drive while you feel dizzy. · Try to prevent falls. Steps you can take include: ¨ Using nonskid mats, adding grab bars near the tub, and using night-lights. ¨ Clearing your home so that walkways are free of anything you might trip on. ¨ Letting family and friends know that you have been feeling dizzy. This will help them know how to help you. When should you call for help? Call 911 anytime you think you may need emergency care. For example, call if: 
? · You passed out (lost consciousness). ? · You have dizziness along with symptoms of a heart attack. These may include: ¨ Chest pain or pressure, or a strange feeling in the chest. 
¨ Sweating. ¨ Shortness of breath. ¨ Nausea or vomiting. ¨ Pain, pressure, or a strange feeling in the back, neck, jaw, or upper belly or in one or both shoulders or arms. ¨ Lightheadedness or sudden weakness. ¨ A fast or irregular heartbeat. ? · You have symptoms of a stroke. These may include: 
¨ Sudden numbness, tingling, weakness, or loss of movement in your face, arm, or leg, especially on only one side of your body. ¨ Sudden vision changes. ¨ Sudden trouble speaking. ¨ Sudden confusion or trouble understanding simple statements. ¨ Sudden problems with walking or balance. ¨ A sudden, severe headache that is different from past headaches. ?Call your doctor now or seek immediate medical care if: 
? · You feel dizzy and have a fever, headache, or ringing in your ears. ? · You have new or increased nausea and vomiting. ? · Your dizziness does not go away or comes back. ? Watch closely for changes in your health, and be sure to contact your doctor if: 
? · You do not get better as expected. Where can you learn more? Go to http://kosta-angle.info/. Enter F856 in the search box to learn more about \"Dizziness: Care Instructions. \" 
 Current as of: March 20, 2017 Content Version: 11.4 © 2874-9907 [employer]. Care instructions adapted under license by [employer] (which disclaims liability or warranty for this information). If you have questions about a medical condition or this instruction, always ask your healthcare professional. [address] any warranty or liability for your use of this information. Preventing Falls: Care Instructions Your Care Instructions Getting around your home safely can be a challenge if you have injuries or health problems that make it easy for you to fall. Loose rugs and furniture in walkways are among the dangers for many older people who have problems walking or who have poor eyesight. People who have conditions such as arthritis, osteoporosis, or dementia also have to be careful not to fall. You can make your home safer with a few simple measures. Follow-up care is a key part of your treatment and safety. Be sure to make and go to all appointments, and call your doctor if you are having problems. It's also a good idea to know your test results and keep a list of the medicines you take. How can you care for yourself at home? Taking care of yourself · You may get dizzy if you do not drink enough water. To prevent dehydration, drink plenty of fluids, enough so that your urine is light yellow or clear like water. Choose water and other caffeine-free clear liquids. If you have kidney, heart, or liver disease and have to limit fluids, talk with your doctor before you increase the amount of fluids you drink. · Exercise regularly to improve your strength, muscle tone, and balance. Walk if you can. Swimming may be a good choice if you cannot walk easily. · Have your vision and hearing checked each year or any time you notice a change. If you have trouble seeing and hearing, you might not be able to avoid objects and could lose your balance. · Know the side effects of the medicines you take. Ask your doctor or pharmacist whether the medicines you take can affect your balance. Sleeping pills or sedatives can affect your balance. · Limit the amount of alcohol you drink. Alcohol can impair your balance and other senses. · Ask your doctor whether calluses or corns on your feet need to be removed. If you wear loose-fitting shoes because of calluses or corns, you can lose your balance and fall. · Talk to your doctor if you have numbness in your feet. Preventing falls at home · Remove raised doorway thresholds, throw rugs, and clutter. Repair loose carpet or raised areas in the floor. · Move furniture and electrical cords to keep them out of walking paths. · Use nonskid floor wax, and wipe up spills right away, especially on ceramic tile floors. · If you use a walker or cane, put rubber tips on it. If you use crutches, clean the bottoms of them regularly with an abrasive pad, such as steel wool. · Keep your house well lit, especially Gearldean Tierney, and outside walkways. Use night-lights in areas such as hallways and bathrooms. Add extra light switches or use remote switches (such as switches that go on or off when you clap your hands) to make it easier to turn lights on if you have to get up during the night. · Install sturdy handrails on stairways. · Move items in your cabinets so that the things you use a lot are on the lower shelves (about waist level). · Keep a cordless phone and a flashlight with new batteries by your bed. If possible, put a phone in each of the main rooms of your house, or carry a cell phone in case you fall and cannot reach a phone. Or, you can wear a device around your neck or wrist. You push a button that sends a signal for help. · Wear low-heeled shoes that fit well and give your feet good support. Use footwear with nonskid soles.  Check the heels and soles of your shoes for wear. Repair or replace worn heels or soles. · Do not wear socks without shoes on wood floors. · Walk on the grass when the sidewalks are slippery. If you live in an area that gets snow and ice in the winter, sprinkle salt on slippery steps and sidewalks. Preventing falls in the bath · Install grab bars and nonskid mats inside and outside your shower or tub and near the toilet and sinks. · Use shower chairs and bath benches. · Use a hand-held shower head that will allow you to sit while showering. · Get into a tub or shower by putting the weaker leg in first. Get out of a tub or shower with your strong side first. 
· Repair loose toilet seats and consider installing a raised toilet seat to make getting on and off the toilet easier. · Keep your bathroom door unlocked while you are in the shower. Where can you learn more? Go to http://kosta-angle.info/. Enter 0476 [id number] in the search box to learn more about \"Preventing Falls: Care Instructions. \" Current as of: May 12, 2017 Content Version: 11.4 © 9551-6285 [employer]. Care instructions adapted under license by [employer] (which disclaims liability or warranty for this information). If you have questions about a medical condition or this instruction, always ask your healthcare professional. [place] [address] any warranty or liability for your use of this information. Introducing [facility]! [facility] introduces CourseHorse patient portal. Now you can access parts of your medical record, email your doctor's office, and request medication refills online. 1. In your internet browser, go to [email or web address] 2. Click on the First Time User? Click Here link in the Sign In box. You will see the New Member Sign Up page. 3. Enter your CourseHorse Access Code exactly as it appears below. You will not need to use this code after youve completed the sign-up process.  If you do not sign up before the expiration date, you must request a new code. · FleAffair Access Code: [email or web address] Expires: 8/9/2018 11:52 AM 
 
4. Enter the last four digits of your Social Security Number (xxxx) and Date of Birth (mm/dd/yyyy) as indicated and click Submit. You will be taken to the next sign-up page. 5. Create a FleAffair ID. This will be your FleAffair login ID and cannot be changed, so think of one that is secure and easy to remember. 6. Create a FleAffair password. You can change your password at any time. 7. Enter your Password Reset Question and Answer. This can be used at a later time if you forget your password. 8. Enter your e-mail address. You will receive e-mail notification when new information is available in [address] 9. Click Sign Up. You can now view and download portions of your medical record. 10. Click the Download Summary menu link to download a portable copy of your medical information. If you have questions, please visit the Frequently Asked Questions section of the FleAffair website. Remember, FleAffair is NOT to be used for urgent needs. For medical emergencies, dial 911. Now available from your iPhone and Android! Please provide this summary of care documentation to your next provider. Your primary care clinician is listed as Amira Willoughby. If you have any questions after today's visit, please call [phone].

## 2018-05-11 NOTE — PATIENT INSTRUCTIONS
Arise slowly out of bed in the morning and any other time. Sit on the side of the bed until dizzyness goes away  Stay hydrated. Dizziness: Care Instructions  Your Care Instructions  Dizziness is the feeling of unsteadiness or fuzziness in your head. It is different than having vertigo, which is a feeling that the room is spinning or that you are moving or falling. It is also different from lightheadedness, which is the feeling that you are about to faint. It can be hard to know what causes dizziness. Some people feel dizzy when they have migraine headaches. Sometimes bouts of flu can make you feel dizzy. Some medical conditions, such as heart problems or high blood pressure, can make you feel dizzy. Many medicines can cause dizziness, including medicines for high blood pressure, pain, or anxiety. If a medicine causes your symptoms, your doctor may recommend that you stop or change the medicine. If it is a problem with your heart, you may need medicine to help your heart work better. If there is no clear reason for your symptoms, your doctor may suggest watching and waiting for a while to see if the dizziness goes away on its own. Follow-up care is a key part of your treatment and safety. Be sure to make and go to all appointments, and call your doctor if you are having problems. It's also a good idea to know your test results and keep a list of the medicines you take. How can you care for yourself at home? · If your doctor recommends or prescribes medicine, take it exactly as directed. Call your doctor if you think you are having a problem with your medicine. · Do not drive while you feel dizzy. · Try to prevent falls. Steps you can take include:  ¨ Using nonskid mats, adding grab bars near the tub, and using night-lights. ¨ Clearing your home so that walkways are free of anything you might trip on. ¨ Letting family and friends know that you have been feeling dizzy.  This will help them know how to help you.  When should you call for help? Call 911 anytime you think you may need emergency care. For example, call if:  ? · You passed out (lost consciousness). ? · You have dizziness along with symptoms of a heart attack. These may include:  ¨ Chest pain or pressure, or a strange feeling in the chest.  ¨ Sweating. ¨ Shortness of breath. ¨ Nausea or vomiting. ¨ Pain, pressure, or a strange feeling in the back, neck, jaw, or upper belly or in one or both shoulders or arms. ¨ Lightheadedness or sudden weakness. ¨ A fast or irregular heartbeat. ? · You have symptoms of a stroke. These may include:  ¨ Sudden numbness, tingling, weakness, or loss of movement in your face, arm, or leg, especially on only one side of your body. ¨ Sudden vision changes. ¨ Sudden trouble speaking. ¨ Sudden confusion or trouble understanding simple statements. ¨ Sudden problems with walking or balance. ¨ A sudden, severe headache that is different from past headaches. ?Call your doctor now or seek immediate medical care if:  ? · You feel dizzy and have a fever, headache, or ringing in your ears. ? · You have new or increased nausea and vomiting. ? · Your dizziness does not go away or comes back. ? Watch closely for changes in your health, and be sure to contact your doctor if:  ? · You do not get better as expected. Where can you learn more? Go to http://kosta-angle.info/. Enter T690 in the search box to learn more about \"Dizziness: Care Instructions. \"  Current as of: March 20, 2017  Content Version: 11.4  © 5098-7385 [employer]. Care instructions adapted under license by [employer] (which disclaims liability or warranty for this information). If you have questions about a medical condition or this instruction, always ask your healthcare professional. [address] any warranty or liability for your use of this information.        Preventing Falls: Care Instructions  Your Care Instructions    Getting around your home safely can be a challenge if you have injuries or health problems that make it easy for you to fall. Loose rugs and furniture in walkways are among the dangers for many older people who have problems walking or who have poor eyesight. People who have conditions such as arthritis, osteoporosis, or dementia also have to be careful not to fall. You can make your home safer with a few simple measures. Follow-up care is a key part of your treatment and safety. Be sure to make and go to all appointments, and call your doctor if you are having problems. It's also a good idea to know your test results and keep a list of the medicines you take. How can you care for yourself at home? Taking care of yourself  · You may get dizzy if you do not drink enough water. To prevent dehydration, drink plenty of fluids, enough so that your urine is light yellow or clear like water. Choose water and other caffeine-free clear liquids. If you have kidney, heart, or liver disease and have to limit fluids, talk with your doctor before you increase the amount of fluids you drink. · Exercise regularly to improve your strength, muscle tone, and balance. Walk if you can. Swimming may be a good choice if you cannot walk easily. · Have your vision and hearing checked each year or any time you notice a change. If you have trouble seeing and hearing, you might not be able to avoid objects and could lose your balance. · Know the side effects of the medicines you take. Ask your doctor or pharmacist whether the medicines you take can affect your balance. Sleeping pills or sedatives can affect your balance. · Limit the amount of alcohol you drink. Alcohol can impair your balance and other senses. · Ask your doctor whether calluses or corns on your feet need to be removed. If you wear loose-fitting shoes because of calluses or corns, you can lose your balance and fall.   · Talk to your doctor if you have numbness in your feet. Preventing falls at home  · Remove raised doorway thresholds, throw rugs, and clutter. Repair loose carpet or raised areas in the floor. · Move furniture and electrical cords to keep them out of walking paths. · Use nonskid floor wax, and wipe up spills right away, especially on ceramic tile floors. · If you use a walker or cane, put rubber tips on it. If you use crutches, clean the bottoms of them regularly with an abrasive pad, such as steel wool. · Keep your house well lit, especially Mariela Longest, and outside walkways. Use night-lights in areas such as hallways and bathrooms. Add extra light switches or use remote switches (such as switches that go on or off when you clap your hands) to make it easier to turn lights on if you have to get up during the night. · Install sturdy handrails on stairways. · Move items in your cabinets so that the things you use a lot are on the lower shelves (about waist level). · Keep a cordless phone and a flashlight with new batteries by your bed. If possible, put a phone in each of the main rooms of your house, or carry a cell phone in case you fall and cannot reach a phone. Or, you can wear a device around your neck or wrist. You push a button that sends a signal for help. · Wear low-heeled shoes that fit well and give your feet good support. Use footwear with nonskid soles. Check the heels and soles of your shoes for wear. Repair or replace worn heels or soles. · Do not wear socks without shoes on wood floors. · Walk on the grass when the sidewalks are slippery. If you live in an area that gets snow and ice in the winter, sprinkle salt on slippery steps and sidewalks. Preventing falls in the bath  · Install grab bars and nonskid mats inside and outside your shower or tub and near the toilet and sinks. · Use shower chairs and bath benches.   · Use a hand-held shower head that will allow you to sit while showering. · Get into a tub or shower by putting the weaker leg in first. Get out of a tub or shower with your strong side first.  · Repair loose toilet seats and consider installing a raised toilet seat to make getting on and off the toilet easier. · Keep your bathroom door unlocked while you are in the shower. Where can you learn more? Go to http://kosta-angle.info/. Enter 0476 [id number] in the search box to learn more about \"Preventing Falls: Care Instructions. \"  Current as of: May 12, 2017  Content Version: 11.4  © 9848-1146 [employer]wise, Kivun Hadash. Care instructions adapted under license by [employer] (which disclaims liability or warranty for this information). If you have questions about a medical condition or this instruction, always ask your healthcare professional. [address] any warranty or liability for your use of this information.

## 2018-05-15 ENCOUNTER — OFFICE VISIT (OUTPATIENT)
Dept: FAMILY MEDICINE CLINIC | Age: 83
End: 2018-05-15

## 2018-05-15 VITALS
BODY MASS INDEX: 26.24 KG/M2 | TEMPERATURE: 98 F | HEIGHT: 61 IN | HEART RATE: 76 BPM | WEIGHT: 139 LBS | RESPIRATION RATE: 18 BRPM | SYSTOLIC BLOOD PRESSURE: 159 MMHG | DIASTOLIC BLOOD PRESSURE: 94 MMHG | OXYGEN SATURATION: 96 %

## 2018-05-15 DIAGNOSIS — E78.5 HYPERLIPIDEMIA, UNSPECIFIED HYPERLIPIDEMIA TYPE: ICD-10-CM

## 2018-05-15 DIAGNOSIS — M79.644 PAIN OF RIGHT THUMB: ICD-10-CM

## 2018-05-15 DIAGNOSIS — E11.65 TYPE 2 DIABETES MELLITUS WITH HYPERGLYCEMIA, WITHOUT LONG-TERM CURRENT USE OF INSULIN (HCC): ICD-10-CM

## 2018-05-15 DIAGNOSIS — E55.9 VITAMIN D DEFICIENCY: ICD-10-CM

## 2018-05-15 DIAGNOSIS — M75.121 COMPLETE TEAR OF RIGHT ROTATOR CUFF: ICD-10-CM

## 2018-05-15 DIAGNOSIS — N18.30 CKD (CHRONIC KIDNEY DISEASE) STAGE 3, GFR 30-59 ML/MIN (HCC): ICD-10-CM

## 2018-05-15 DIAGNOSIS — I10 ESSENTIAL HYPERTENSION: Primary | ICD-10-CM

## 2018-05-15 DIAGNOSIS — M17.0 PRIMARY OSTEOARTHRITIS OF BOTH KNEES: ICD-10-CM

## 2018-05-15 DIAGNOSIS — Z23 ENCOUNTER FOR IMMUNIZATION: ICD-10-CM

## 2018-05-15 DIAGNOSIS — M81.0 AGE-RELATED OSTEOPOROSIS WITHOUT CURRENT PATHOLOGICAL FRACTURE: ICD-10-CM

## 2018-05-15 DIAGNOSIS — M25.511 ACUTE PAIN OF RIGHT SHOULDER: ICD-10-CM

## 2018-05-15 LAB — HBA1C MFR BLD HPLC: 5.6 %

## 2018-05-15 RX ORDER — LOSARTAN POTASSIUM 50 MG/1
50 TABLET ORAL DAILY
Qty: 90 TAB | Refills: 3 | Status: SHIPPED | OUTPATIENT
Start: 2018-05-15 | End: 2018-10-04 | Stop reason: ALTCHOICE

## 2018-05-15 NOTE — PATIENT INSTRUCTIONS
Denosumab (By injection)   Denosumab (mfh-DNE-xg-mab)  Treats osteoporosis, bone cancer, hypercalcemia, and other bone problems in patients who have cancer. Brand Name(s): Prolia, Xgeva   There may be other brand names for this medicine. When This Medicine Should Not Be Used: This medicine is not right for everyone. You should not receive it if you had an allergic reaction to denosumab or if you are pregnant. How to Use This Medicine:   Injectable  · A doctor or other health professional will give you this medicine. This medicine is usually given as a shot under the skin of your upper arm, upper thigh, or stomach. · This medicine should come with a Medication Guide. Ask your pharmacist for a copy if you do not have one. · Missed dose: Call your doctor or pharmacist for instructions. Drugs and Foods to Avoid:   Ask your doctor or pharmacist before using any other medicine, including over-the-counter medicines, vitamins, and herbal products. · Do not use Prolia® and Xgeva® together. They contain the same medicine. · Some medicines can affect how denosumab works. Tell your doctor if you are also using medicine that weakens your immune system, including a steroid or cancer medicine. Warnings While Using This Medicine:   · This medicine may cause birth defects if either partner is using it during conception or pregnancy. Tell your doctor right away if you or your partner becomes pregnant. Use an effective form of birth control. Women who are being treated with Ermelinda German should continue using birth control for at least 5 months after the last dose. · Tell your doctor if you are breastfeeding, or if you have kidney disease, diabetes, gum disease, or an allergy to latex. Tell your doctor if you have problems with your thyroid, parathyroid, or digestive system.   · This medicine may cause the following problems:  ¨ Low calcium levels in your blood  ¨ Increased risk of broken thigh bone  ¨ Increased risk of infections  ¨ Serious skin reactions  ¨ Severe bone, joint, or muscle pain  · This medicine can cause jaw problems. You must have regular dental exams while you are being treated with this medicine. Tell your dentist that you are using this medicine. Practice good oral hygiene. · Do not suddenly stop using Prolia® without checking first with your doctor. Doing so may increase risk for more fractures. Talk to your doctor about other medicine that you can take. · Your doctor will do lab tests at regular visits to check on the effects of this medicine. Keep all appointments. Possible Side Effects While Using This Medicine:   Call your doctor right away if you notice any of these side effects:  · Allergic reaction: Itching or hives, swelling in your face or hands, swelling or tingling in your mouth or throat, chest tightness, trouble breathing  · Blistering, peeling, red skin rash  · Chest pain, fast or uneven heartbeat, trouble breathing  · Fever, chills, cough, sore throat, body aches  · Lightheadedness, dizziness, fainting  · Muscle spasms or twitching, numbness or tingling in your fingers, toes, or lips  · Pain or burning during urination, change in how much or how often you urinate  · Pain, swelling, heavy feeling, or numbness in your mouth or jaw, loose teeth or other teeth problems  · Severe bone, joint, or muscle pain  · Unusual pain in your thigh, groin, or hip  If you notice these less serious side effects, talk with your doctor:   · Diarrhea, nausea  · Redness, pain, itching, burning, swelling, or a lump under your skin where the shot was given  · Tiredness or weakness  If you notice other side effects that you think are caused by this medicine, tell your doctor. Call your doctor for medical advice about side effects.  You may report side effects to FDA at 4-330-RGD-0465  © 2017 [facility] Information is for End User's use only and may not be sold, redistributed or otherwise used for commercial purposes. The above information is an  only. It is not intended as medical advice for individual conditions or treatments. Talk to your doctor, nurse or pharmacist before following any medical regimen to see if it is safe and effective for you. Medicare Wellness Visit, Female    The best way to live healthy is to have a healthy lifestyle by eating a well-balanced diet, exercising regularly, limiting alcohol and stopping smoking. Regular physical exams and screening tests are another way to keep healthy. Preventive exams provided by your health care provider can find health problems before they become diseases or illnesses. Preventive services including immunizations, screening tests, monitoring and exams can help you take care of your own health. All people over age 72 should have a pneumovax  and and a prevnar shot to prevent pneumonia. These are once in a lifetime unless you and your provider decide differently. All people over 65 should have a yearly flu shot and a tetanus vaccine every 10 years. A bone mass density to screen for osteoporosis or thinning of the bones should be done every 2 years after 65. Screening for diabetes mellitus with a blood sugar test should be done every year. Glaucoma is a disease of the eye due to increased ocular pressure that can lead to blindness and it should be done every year by an eye professional.    Cardiovascular screening tests that check for elevated lipids (fatty part of blood) which can lead to heart disease and strokes should be done every 5 years. Colorectal screening that evaluates for blood or polyps in your colon should be done yearly as a stool test or every five years as a flexible sigmoidoscope or every 10 years as a colonoscopy up to age 76. Breast cancer screening with a mammogram is recommended biennially  for women age 54-69.     Screening for cervical cancer with a pap smear and pelvic exam is recommended for women after age 72 years every 2 years up to age 79 or when the provider and patient decide to stop. If there is a history of cervical abnormalities or other increased risk for cancer then the test is recommended yearly. Hepatitis C screening is also recommended for anyone born between 80 through [address]. A shingles vaccine is also recommended once in a lifetime after age 61. Your Medicare Wellness Exam is recommended annually.     Here is a list of your current Health Maintenance items with a due date:  Health Maintenance Due   Topic Date Due    DTaP/Tdap/Td  (1 - Tdap) 01/12/2013    Pneumococcal Vaccine (2 of 2 - PPSV23) 08/19/2016    Albumin Urine Test  05/25/2018    Diabetic Foot Care  06/14/2018

## 2018-05-15 NOTE — PROGRESS NOTES
Chief Complaint   Patient presents with    Follow Up Chronic Condition     3 month; A1C check     1. Have you been to the ER, urgent care clinic since your last visit? Hospitalized since your last visit? No    2. Have you seen or consulted any other health care providers outside of the [facility] since your last visit? Include any pap smears or colon screening.  No

## 2018-05-15 NOTE — MR AVS SNAPSHOT
Celeste Barclay [place] [phone] 
[phone] Patient: Alba Pringle MRN: [id number] IST:[birth year] Visit Information Date & Time Provider Department Dept. Phone Encounter #  
 5/15/2018  8:30 AM Jerrica Gabriel MD [facility]  [id number] Follow-up Instructions Return in about 2 months (around 7/15/2018) for follow up. Routing History Your Appointments 5/29/2018  9:00 AM  
Follow Up with MD Katherine Han 23 ([address]) Appt Note: f/u  
 [facility] [address]  
  
   
 [address] [facility]  
  
    
 10/4/2018  9:15 AM  
Office Visit with MD Katherine Han 23 ([address]) Appt Note: 295 [facility] [address] [facility] [address]  
  
   
 [address] [place] [address] [address] Upcoming Health Maintenance Date Due DTaP/Tdap/Td series (1 - Tdap) 1/12/2013 Pneumococcal 65+ Low/Medium Risk (2 of 2 - PPSV23) 8/19/2016 MICROALBUMIN Q1 5/25/2018 FOOT EXAM Q1 6/14/2018 HEMOGLOBIN A1C Q6M 7/9/2018 Influenza Age 5 to Adult 8/1/2018 MEDICARE YEARLY EXAM 10/6/2018 EYE EXAM RETINAL OR DILATED Q1 2/8/2019 LIPID PANEL Q1 2/26/2019 GLAUCOMA SCREENING Q2Y 2/8/2020 Allergies as of 5/15/2018  Review Complete On: 5/15/2018 By: Jerrica Gabriel MD  
 No Known Allergies Current Immunizations  Reviewed on 5/15/2018 Name Date Influenza High Dose Vaccine PF 10/31/2017, 11/1/2016 Influenza Vaccine 11/4/2014  9:37 AM, 10/10/2013 Influenza Vaccine (Quad) 11/3/2015  9:58 AM  
 Influenza Vaccine Split 10/18/2012 11:44 AM, 9/29/2011, 3/1/2011, 3/1/2011, 10/6/2010 Pneumococcal Conjugate (PCV-13) 8/19/2015  2:46 PM  
 Pneumococcal Polysaccharide (PPSV-23)  Incomplete Td 1/11/2013 12:00 AM  
 Zoster Vaccine, Live 1/1/2016 Reviewed by Zahra Devlin MD on 5/15/2018 at  8:58 AM  
 Reviewed by Zahra Devlin MD on 5/15/2018 at  8:58 AM  
You Were Diagnosed With   
  
 Codes Comments Type 2 diabetes mellitus with hyperglycemia, without long-term current use of insulin (HCC)    -  Primary ICD-10-CM: E11.65 ICD-9-CM: 250.00, 790.29 Essential hypertension     ICD-10-CM: I10 
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 3 Acute pain of right shoulder     ICD-10-CM: M25.511 ICD-9-CM: 719.41 Primary osteoarthritis of both knees     ICD-10-CM: M17.0 ICD-9-CM: 715.16 Encounter for immunization     ICD-10-CM: T89 ICD-9-CM: V03.89 Vitals BP Pulse Temp Resp Height(growth percentile) Weight(growth percentile) (!) 159/94 76 98 °F (36.7 °C) (Oral) 18 5' 1\" (1.549 m) 139 lb (63 kg) SpO2 BMI OB Status Smoking Status 96% 26.26 kg/m2 Hysterectomy Never Smoker Vitals History BMI and BSA Data Body Mass Index Body Surface Area  
 26.26 kg/m 2 1.65 m 2 Preferred Pharmacy Pharmacy Name Phone [facility], [address] [phone] Your Updated Medication List  
  
   
This list is accurate as of 5/15/18  9:15 AM.  Always use your most recent med list.  
  
  
  
  
 aspirin 81 mg chewable tablet Commonly known as:  ASPIRIN LOW-STRENGTH  
CHEW AND SWALLOW ONE TABLET BY MOUTH DAILY  
  
 atorvastatin 10 mg tablet Commonly known as:  LIPITOR Take 1 Tab by mouth daily. ergocalciferol 50,000 unit capsule Commonly known as:  ERGOCALCIFEROL Take 1 Cap by mouth every seven (7) days. FREESTYLE LANCETS 28 gauge Misc Generic drug:  lancets DAILY USE FREESTYLE TEST strip Generic drug:  glucose blood VI test strips PATIENT TESTING BLOOD SUGAR ONCE DAILY losartan 50 mg tablet Commonly known as:  COZAAR Take 1 Tab by mouth daily. TYLENOL 325 mg tablet Generic drug:  acetaminophen Take  by mouth every four (4) hours as needed for Pain. Prescriptions Sent to Pharmacy Refills  
 losartan (COZAAR) 50 mg tablet 3 Sig: Take 1 Tab by mouth daily. Class: Normal  
 Pharmacy: [facility] [id number] [address] #: [phone] Route: Oral  
  
We Performed the Following ADMIN PNEUMOCOCCAL VACCINE [ [facility]] AMB POC HEMOGLOBIN A1C [43598 CPT(R)]  DIABETES FOOT EXAM [7 Custom] PNEUMOCOCCAL POLYSACCHARIDE VACCINE, 23-VALENT, ADULT OR IMMUNOSUPPRESSED PT DOSE, [59155 CPT(R)] Follow-up Instructions Return in about 2 months (around 7/15/2018) for follow up. To-Do List   
 05/15/2018 Lab:  MICROALBUMIN, UR, RAND W/ MICROALB/CREAT RATIO   
  
 05/15/2018 Imaging:  XR SHOULDER RT AP/LAT MIN 2 V Patient Instructions Denosumab (By injection) Denosumab ([id number]-VAZ-kc-mab) Treats osteoporosis, bone cancer, hypercalcemia, and other bone problems in patients who have cancer. Brand Name(s): Godfreytalha Whitfield There may be other brand names for this medicine. When This Medicine Should Not Be Used: This medicine is not right for everyone. You should not receive it if you had an allergic reaction to denosumab or if you are pregnant. How to Use This Medicine:  
Injectable · A doctor or other health professional will give you this medicine. This medicine is usually given as a shot under the skin of your upper arm, upper thigh, or stomach. · This medicine should come with a Medication Guide. Ask your pharmacist for a copy if you do not have one. · Missed dose: Call your doctor or pharmacist for instructions. Drugs and Foods to Avoid: Ask your doctor or pharmacist before using any other medicine, including over-the-counter medicines, vitamins, and herbal products. · Do not use Prolia® and Xgeva® together. They contain the same medicine. · Some medicines can affect how denosumab works. Tell your doctor if you are also using medicine that weakens your immune system, including a steroid or cancer medicine. Warnings While Using This Medicine: · This medicine may cause birth defects if either partner is using it during conception or pregnancy. Tell your doctor right away if you or your partner becomes pregnant. Use an effective form of birth control. Women who are being treated with Latisha Bolk should continue using birth control for at least 5 months after the last dose. · Tell your doctor if you are breastfeeding, or if you have kidney disease, diabetes, gum disease, or an allergy to latex. Tell your doctor if you have problems with your thyroid, parathyroid, or digestive system. · This medicine may cause the following problems: 
¨ Low calcium levels in your blood ¨ Increased risk of broken thigh bone ¨ Increased risk of infections ¨ Serious skin reactions ¨ Severe bone, joint, or muscle pain · This medicine can cause jaw problems. You must have regular dental exams while you are being treated with this medicine. Tell your dentist that you are using this medicine. Practice good oral hygiene. · Do not suddenly stop using Prolia® without checking first with your doctor. Doing so may increase risk for more fractures. Talk to your doctor about other medicine that you can take. · Your doctor will do lab tests at regular visits to check on the effects of this medicine. Keep all appointments. Possible Side Effects While Using This Medicine: Call your doctor right away if you notice any of these side effects: · Allergic reaction: Itching or hives, swelling in your face or hands, swelling or tingling in your mouth or throat, chest tightness, trouble breathing · Blistering, peeling, red skin rash · Chest pain, fast or uneven heartbeat, trouble breathing · Fever, chills, cough, sore throat, body aches · Lightheadedness, dizziness, fainting · Muscle spasms or twitching, numbness or tingling in your fingers, toes, or lips · Pain or burning during urination, change in how much or how often you urinate · Pain, swelling, heavy feeling, or numbness in your mouth or jaw, loose teeth or other teeth problems · Severe bone, joint, or muscle pain · Unusual pain in your thigh, groin, or hip If you notice these less serious side effects, talk with your doctor: · Diarrhea, nausea · Redness, pain, itching, burning, swelling, or a lump under your skin where the shot was given · Tiredness or weakness If you notice other side effects that you think are caused by this medicine, tell your doctor. Call your doctor for medical advice about side effects. You may report side effects to FDA at 9-567-FDA-3255 © 2017 [address] Information is for End User's use only and may not be sold, redistributed or otherwise used for commercial purposes. The above information is an  only. It is not intended as medical advice for individual conditions or treatments. Talk to your doctor, nurse or pharmacist before following any medical regimen to see if it is safe and effective for you. Medicare Wellness Visit, Female The best way to live healthy is to have a healthy lifestyle by eating a well-balanced diet, exercising regularly, limiting alcohol and stopping smoking. Regular physical exams and screening tests are another way to keep healthy.  Preventive exams provided by your health care provider can find health problems before they become diseases or illnesses. Preventive services including immunizations, screening tests, monitoring and exams can help you take care of your own health. All people over age 72 should have a pneumovax  and and a prevnar shot to prevent pneumonia. These are once in a lifetime unless you and your provider decide differently. All people over 65 should have a yearly flu shot and a tetanus vaccine every 10 years. A bone mass density to screen for osteoporosis or thinning of the bones should be done every 2 years after 65. Screening for diabetes mellitus with a blood sugar test should be done every year. Glaucoma is a disease of the eye due to increased ocular pressure that can lead to blindness and it should be done every year by an eye professional. 
 
Cardiovascular screening tests that check for elevated lipids (fatty part of blood) which can lead to heart disease and strokes should be done every 5 years. Colorectal screening that evaluates for blood or polyps in your colon should be done yearly as a stool test or every five years as a flexible sigmoidoscope or every 10 years as a colonoscopy up to age 76. Breast cancer screening with a mammogram is recommended biennially  for women age 54-69. Screening for cervical cancer with a pap smear and pelvic exam is recommended for women after age 72 years every 2 years up to age 79 or when the provider and patient decide to stop. If there is a history of cervical abnormalities or other increased risk for cancer then the test is recommended yearly. Hepatitis C screening is also recommended for anyone born between [birth year] through [address]. A shingles vaccine is also recommended once in a lifetime after age 61. Your Medicare Wellness Exam is recommended annually. Here is a list of your current Health Maintenance items with a due date: 
Health Maintenance Due Topic Date Due  
 DTaP/Tdap/Td  (1 - Tdap) 01/12/2013  Pneumococcal Vaccine (2 of 2 - PPSV23) 08/19/2016  Albumin Urine Test  05/25/2018 Krystal Morley Diabetic Foot Care  06/14/2018 Introducing [address]! [facility] introduces EQAL patient portal. Now you can access parts of your medical record, email your doctor's office, and request medication refills online. 1. In your internet browser, go to [email or web address] 2. Click on the First Time User? Click Here link in the Sign In box. You will see the New Member Sign Up page. 3. Enter your EQAL Access Code exactly as it appears below. You will not need to use this code after youve completed the sign-up process. If you do not sign up before the expiration date, you must request a new code. · EQAL Access Code: [email or web address] Expires: 8/9/2018 11:52 AM 
 
4. Enter the last four digits of your Social Security Number (xxxx) and Date of Birth (mm/dd/yyyy) as indicated and click Submit. You will be taken to the next sign-up page. 5. Create a EQAL ID. This will be your EQAL login ID and cannot be changed, so think of one that is secure and easy to remember. 6. Create a EQAL password. You can change your password at any time. 7. Enter your Password Reset Question and Answer. This can be used at a later time if you forget your password. 8. Enter your e-mail address. You will receive e-mail notification when new information is available in [address] 9. Click Sign Up. You can now view and download portions of your medical record. 10. Click the Download Summary menu link to download a portable copy of your medical information. If you have questions, please visit the Frequently Asked Questions section of the EQAL website. Remember, EQAL is NOT to be used for urgent needs. For medical emergencies, dial 911. Now available from your iPhone and Android! Please provide this summary of care documentation to your next provider. Your primary care clinician is listed as Bon Garcia. If you have any questions after today's visit, please call [phone].

## 2018-05-15 NOTE — PROGRESS NOTES
Chief Complaint   Patient presents with    Follow Up Chronic Condition     3 month; A1C check       Pt is a 80y.o. year old female who presents for follow up of her chronic medical problems/several acute issues  Brought by her daughter    Health Maintenance Due   Topic Date Due    DTaP/Tdap/Td series (1 - Tdap) 01/12/2013    Pneumococcal 65+ Low/Medium Risk (2 of 2 - PPSV23) 08/19/2016-today    MICROALBUMIN Q1  05/25/2018-today    FOOT EXAM Q1  06/14/2018-today     Last Point of Care HGB A1C  Hemoglobin A1c (POC)   Date Value Ref Range Status   01/09/2018 5.9 % Final   Denies polyuria, polydipsia and polyphagia    BP Readings from Last 3 Encounters:   05/15/18 (!) 159/94   05/11/18 (!) 182/107   03/22/18 (!) 149/94   Repeat BP-still elevated  Took meds already  Now with dizziness daily when she moves too fast    Lab Results   Component Value Date/Time    Cholesterol, total 136 02/26/2018 12:00 AM    HDL Cholesterol 53 02/26/2018 12:00 AM    LDL, calculated 67 02/26/2018 12:00 AM    VLDL, calculated 16 02/26/2018 12:00 AM    Triglyceride 78 02/26/2018 12:00 AM    CHOL/HDL Ratio 3.8 09/23/2010 01:08 PM   On Lipitor     Lab Results   Component Value Date/Time    VITAMIN D, 25-HYDROXY 52.6 02/26/2018 12:00 AM     S/p rx    From last visit with NP after a fall last Thursday-torn ulnar ligament-saw hand surgery? Not yet  IMPRESSION:   Asymmetrically prominent degenerative changes of the first interphalangeal joint  with adjacent soft tissue swelling and secondary findings that would seem to  support associated ulnar collateral ligament tear. Other potential etiologies  such as infection or an inflammatory arthropathy are thought less likely.     Right shoulder also in pain  Bruise was on the right hip; also still with pain    Wt Readings from Last 3 Encounters:   05/15/18 139 lb (63 kg)   05/11/18 139 lb 9.6 oz (63.3 kg)   03/22/18 139 lb 11.2 oz (63.4 kg)   BMI 26    Tylenol not helping with arthritis pain  Sees Kidney doc once a year and told she cannot take arthritis med  GFR 53 with last labs    Osteoporosis on Dexa 5/2017-was on Boniva for a while  Patient still mobile so fall risk      ROS:    Pt denies: Wt loss, Fever/Chills, HA, Visual changes, Fatigue, Chest pain, SOB, SALOMON, Abd pain, N/V/D/C, Blood in stool or urine, Edema. Pertinent positive as above in HPI. All others were negative    Patient Active Problem List   Diagnosis Code    Essential hypertension I10    Osteoporosis without current pathological fracture M81.0    Hyperlipidemia E78.5    Primary osteoarthritis of both knees M17.0    Advanced directives, counseling/discussion Z71.89    Overweight (BMI 25.0-29. 9) E66.3    Type 2 diabetes mellitus with hyperglycemia, without long-term current use of insulin ([facility]) E11.65    CKD (chronic kidney disease) stage 3, GFR 30-59 ml/min N18.3       Past Medical History:   Diagnosis Date    Diabetes ([facility]     Essential hypertension 5/25/2017    Hyperlipidemia 5/25/2017       Current Outpatient Prescriptions   Medication Sig Dispense Refill    losartan (COZAAR) 50 mg tablet Take 1 Tab by mouth daily. 90 Tab 3    ergocalciferol (ERGOCALCIFEROL) 50,000 unit capsule Take 1 Cap by mouth every seven (7) days. 12 Cap 1    atorvastatin (LIPITOR) 10 mg tablet Take 1 Tab by mouth daily. 90 Tab 3    acetaminophen (TYLENOL) 325 mg tablet Take  by mouth every four (4) hours as needed for Pain.       FREESTYLE TEST strip PATIENT TESTING BLOOD SUGAR ONCE DAILY 1 Package 10    FREESTYLE LANCETS 28 gauge misc DAILY USE 1 Package 10    aspirin (ASPIRIN LOW-STRENGTH) 81 mg chewable tablet CHEW AND SWALLOW ONE TABLET BY MOUTH DAILY 90 Tab 1       History   Smoking Status    Never Smoker   Smokeless Tobacco    Never Used       No Known Allergies    Patient Labs were reviewed: yes      Patient Past Records were reviewed:  yes        Objective:     Vitals:    05/15/18 0836   BP: (!) 159/94   Pulse: 76   Resp: 18   Temp: 98 °F (36.7 °C)   TempSrc: Oral   SpO2: 96%   Weight: 139 lb (63 kg)   Height: 5' 1\" (1.549 m)     Body mass index is 26.26 kg/(m^2). Exam:   Appearance: alert, well appearing,  oriented to person, place, and time, acyanotic, in no respiratory distress and well hydrated. Walks with a cane  HEENT:  NC/AT, pink conj, anicteric sclerae  Neck:  No cervical lymphadenopathy, no JVD, no thyromegaly, no carotid bruit  Heart:  RRR without M/R/G  Lungs:  CTAB, no rhonchi, rales, or wheezes with good air exchange   Abdomen:  Non-tender, pos bowel sounds, no hepatosplenomegaly  Ext:  No C/C/E, severely decreased adduction of the right shoulder/pain over the rotator cuff, right thumb swollen  Skin: no rash  Neuro: no lateralizing signs, CNs II-XII intact    Diabetic foot exam:     Left: Reflexes 2+     Vibratory sensation normal    Proprioception normal   Sharp/dull discrimination normal    Filament test normal sensation with micro filament   Pulse DP: 2+ (normal)   Pulse PT: 2+ (normal)   Deformities: None  Right: Reflexes 2+   Vibratory sensation normal   Proprioception normal   Sharp/dull discrimination normal   Filament test reduced sensation with micro filament   Pulse DP: 2+ (normal)   Pulse PT: 2+ (normal)   Deformities: None  Assessment/ Plan:   Diagnoses and all orders for this visit:    1. Essential hypertension-uncontrolled, will increase the Cozaar from 25 to 50 mg  -     losartan (COZAAR) 50 mg tablet; Take 1 Tab by mouth daily. 2. Acute pain of right shoulder-new problem  -     XR SHOULDER RT AP/LAT MIN 2 V; Future  -     REFERRAL TO ORTHOPEDIC SURGERY    3. Pain of right thumb-from last visit but has not yet seen specialist  -     REFERRAL TO ORTHOPEDIC SURGERY    4.  Type 2 diabetes mellitus with hyperglycemia, without long-term current use of insulin (HCC)-at goal; continue with diet/off meds  -     AMB POC HEMOGLOBIN A1C  Last Point of Care HGB A1C  Hemoglobin A1c (POC)   Date Value Ref Range Status   05/15/2018 5.6 % Final      -      DIABETES FOOT EXAM  -     MICROALBUMIN, UR, RAND W/ MICROALB/CREAT RATIO; Future    5. Hyperlipidemia, unspecified hyperlipidemia type-at goal on Lipitor    6. Age-related osteoporosis without current pathological fracture-will do prior auth for Prolia as she is a fall risk/frail    7. Vitamin D deficiency-currently on rx, check level next visit    8. CKD (chronic kidney disease) stage 3, GFR 30-59 ml/min-Renal following; continue to avoid rx and OTC NSAIDs    9. Primary osteoarthritis of both knees-continue with local tx; declines to have injections    10. Encounter for immunization  -     Pneumococcal Admin ()  -     Pneumococcal Polysaccharide Vaccine    11. Complete tear of right rotator cuff-seen on Xray  -     REFERRAL TO ORTHOPEDIC SURGERY        Follow-up Disposition:  Return in about 2 months (around 7/15/2018) for follow up. I have discussed the diagnosis with the patient and the intended plan as seen in the above orders. The patient has received an After-Visit Summary and questions were answered concerning future plans. Medication Side Effects and Warnings were discussed with patient: yes    Patient verbalized understanding of above instructions.     Aj Alonso MD  Internal Medicine  [facility]

## 2018-05-16 LAB
ALBUMIN/CREAT UR: 1156.9 MG/G CREAT (ref 0–30)
CREAT UR-MCNC: 101.5 MG/DL
MICROALBUMIN UR-MCNC: 1174.3 UG/ML

## 2018-07-10 ENCOUNTER — OFFICE VISIT (OUTPATIENT)
Dept: FAMILY MEDICINE CLINIC | Age: 83
End: 2018-07-10

## 2018-07-10 VITALS
OXYGEN SATURATION: 98 % | DIASTOLIC BLOOD PRESSURE: 73 MMHG | TEMPERATURE: 97.1 F | HEIGHT: 61 IN | SYSTOLIC BLOOD PRESSURE: 120 MMHG | HEART RATE: 70 BPM

## 2018-07-10 DIAGNOSIS — M17.0 PRIMARY OSTEOARTHRITIS OF BOTH KNEES: ICD-10-CM

## 2018-07-10 DIAGNOSIS — S82.891A CLOSED FRACTURE OF RIGHT ANKLE, INITIAL ENCOUNTER: ICD-10-CM

## 2018-07-10 DIAGNOSIS — M80.00XA AGE-RELATED OSTEOPOROSIS WITH CURRENT PATHOLOGICAL FRACTURE, INITIAL ENCOUNTER: ICD-10-CM

## 2018-07-10 DIAGNOSIS — I10 ESSENTIAL HYPERTENSION: Primary | ICD-10-CM

## 2018-07-10 NOTE — PROGRESS NOTES
Chief Complaint   Patient presents with    Follow Up Chronic Condition     2 month   [facility] Follow Up     Seen at [facility] for fall       1. Have you been to the ER, urgent care clinic since your last visit? Hospitalized since your last visit? Yes Where: [facility]- For fall    2. Have you seen or consulted any other health care providers outside of the [address] [place] [address] since your last visit? Include any pap smears or colon screening.  Yes Where: [facility] Dr. Greta Farias scheduled to return 7/16

## 2018-07-10 NOTE — PROGRESS NOTES
Chief Complaint   Patient presents with    Follow Up Chronic Condition     2 month   [facility] Follow Up     Seen at [facility] for fall       Pt is a 80y.o. year old female who presents for follow up of her chronic medical problems    Health Maintenance Due   Topic Date Due    DTaP/Tdap/Td series (1 - Tdap) 01/12/2013      CT LOWER EXT RT W/O CONTRAST6/19/2018  [facility]  Result Impression   Impression:  1.  Intra-articular tibial medial malleolus fracture. 2.  Small fibular lateral malleolus avulsion fracture. 3.  Pes planus with advanced mid-hindfoot osteoarthritis, likely contributing to mild talar anterior subluxation. 4.  Soft tissue swelling. ED Notes - Tim Casas RN - 06/21/2018 8:22 AM EDT  Pain assessment on discharge was 8. Condition stable. Patient discharged to home. Patient education was completed: yes  Education taught to: patient  Teaching method used was discussion and handout. Understanding of teaching was good. Patient was discharged ambulatory. Discharged with family. Valuables were given to: patient.      Back to top of Miscellaneous Notes  ED Provider Notes - Marlene Bauer MD - 06/21/2018 7:59 AM EDT  Formatting of this note may be different from the original.  [address] [facility]    Time of Arrival: 6/21/2018 7:21 AM     (M79.604) Right leg pain    Assessment/Differential Diagnosis: Cast too tight, pain from injury    ED Course/Medical Decision Making:   Patient with intra-articular tibial medial malleolus fracture by CAT scan  Patient is concerned that her splint is too tight  Splint removed leg reexamined splint reapplied not as tight as before  Patient reports feels much more comfortable and wishes to go home  PMD and orthopedic follow-up recommended      . [facility]/Procedures/Consults: [facility]     Disposition: Home    Discharge Medication List as of 6/21/2018 8:17 AM       Chief Complaint   Patient presents with   Donato Armando Jim Murguia is a 80 y.o. female who presents to the ED c/o right ankle pain. She was seen here on 6/19/2018 with closed nondisplaced bicondylar fracture of right tibia s/p fall. She returns today because \"my cast is too tight\" and she is uncomfortable. She has appointment scheduled with ortho next week with Dr. Gwen Tucker. She has no other complaints. BP Readings from Last 3 Encounters:   07/10/18 120/73   05/15/18 (!) 159/94   05/11/18 (!) 182/107     Repeat BP-better    Osteoporosis-used to be on Fosamax, shifted to [facility] for over 5 yrs    FMLA for granddaughter to take care of her        ROS:    Pt denies: Wt loss, Fever/Chills, HA, Visual changes, Fatigue, Chest pain, SOB, SALOMON, Abd pain, N/V/D/C, Blood in stool or urine, Edema. Pertinent positive as above in HPI. All others were negative    Patient Active Problem List   Diagnosis Code    Essential hypertension I10    Osteoporosis without current pathological fracture M81.0    Hyperlipidemia E78.5    Primary osteoarthritis of both knees M17.0    Advanced directives, counseling/discussion Z71.89    Overweight (BMI 25.0-29. 9) E66.3    Type 2 diabetes mellitus with hyperglycemia, without long-term current use of insulin ([facility]) E11.65    CKD (chronic kidney disease) stage 3, GFR 30-59 ml/min N18.3       Past Medical History:   Diagnosis Date    [facility]     Essential hypertension 5/25/2017    Hyperlipidemia 5/25/2017       Current Outpatient Prescriptions   Medication Sig Dispense Refill    losartan (COZAAR) 50 mg tablet Take 1 Tab by mouth daily. 90 Tab 3    ergocalciferol (ERGOCALCIFEROL) 50,000 unit capsule Take 1 Cap by mouth every seven (7) days. 12 Cap 1    atorvastatin (LIPITOR) 10 mg tablet Take 1 Tab by mouth daily. 90 Tab 3    acetaminophen (TYLENOL) 325 mg tablet Take  by mouth every four (4) hours as needed for Pain.       FREESTYLE TEST strip PATIENT TESTING BLOOD SUGAR ONCE DAILY 1 Package 10    FREESTYLE LANCETS 28 gauge misc DAILY USE 1 Package 10    aspirin (ASPIRIN LOW-STRENGTH) 81 mg chewable tablet CHEW AND SWALLOW ONE TABLET BY MOUTH DAILY 90 Tab 1       History   Smoking Status    Never Smoker   Smokeless Tobacco    Never Used       No Known Allergies    Patient Labs were reviewed: yes      Patient Past Records were reviewed:  yes        Objective:     Vitals:    07/10/18 1239 07/10/18 1307   BP: 99/65 120/73   Pulse: 74 70   Temp: 97.1 °F (36.2 °C)    TempSrc: Oral    SpO2: 98%    Height: 5' 1\" (1.549 m)      There is no height or weight on file to calculate BMI. Exam:   Appearance: alert, well appearing,  oriented to person, place, and time, acyanotic, in no respiratory distress and well hydrated. HEENT:  NC/AT, pink conj, anicteric sclerae  Neck:  No cervical lymphadenopathy, no JVD, no thyromegaly, no carotid bruit  Heart:  RRR without M/R/G  Lungs:  CTAB, no rhonchi, rales, or wheezes with good air exchange   Abdomen:  Non-tender, pos bowel sounds, no hepatosplenomegaly  Ext:  No C/C/E    Skin: no rash  Neuro: no lateralizing signs, CNs II-XII intact      Assessment/ Plan:   Diagnoses and all orders for this visit:    1. Essential hypertension    2. Primary osteoarthritis of both knees    3. Closed fracture of right ankle, initial encounter    4. Age-related osteoporosis with current pathological fracture, initial encounter-will do prior auth for Prolia michoacano with recent fracture      Follow-up Disposition:  Return in about 3 months (around 10/10/2018) for follow up. I have discussed the diagnosis with the patient and the intended plan as seen in the above orders. The patient has received an After-Visit Summary and questions were answered concerning future plans. Medication Side Effects and Warnings were discussed with patient: yes    Patient verbalized understanding of above instructions.     Edward Cash MD  Internal Medicine  [facility]

## 2018-07-10 NOTE — MR AVS SNAPSHOT
[address] [place] [address] [facility] [address] [id number] 
[phone] Patient: Alba Pringle MRN: [id number] BGS:[birth year] Visit Information Date & Time Provider Department Dept. Phone Encounter #  
 7/10/2018 12:30 PM Vy Castillo MD Y[facility]min  [id number] Follow-up Instructions Return in about 3 months (around 10/10/2018) for follow up. Routing History Your Appointments 10/4/2018  9:15 AM  
Office Visit with Vy Castillo MD  
[facility] 23 ([facility]) Appt Note: [facility] [address] [facility]  
  
   
 [facility] [address] [place] [address] [address] Upcoming Health Maintenance Date Due DTaP/Tdap/Td series (1 - Tdap) 1/12/2013 Influenza Age 5 to Adult 8/1/2018 MEDICARE YEARLY EXAM 10/6/2018 HEMOGLOBIN A1C Q6M 11/15/2018 EYE EXAM RETINAL OR DILATED Q1 2/8/2019 LIPID PANEL Q1 2/26/2019 FOOT EXAM Q1 5/15/2019 MICROALBUMIN Q1 5/15/2019 GLAUCOMA SCREENING Q2Y 2/8/2020 Allergies as of 7/10/2018  Review Complete On: 7/10/2018 By: Vy Castillo MD  
 No Known Allergies Current Immunizations  Reviewed on 5/15/2018 Name Date Influenza High Dose Vaccine PF 10/31/2017, 11/1/2016 Influenza Vaccine 11/4/2014  9:37 AM, 10/10/2013 Influenza Vaccine (Quad) 11/3/2015  9:58 AM  
 Influenza Vaccine Split 10/18/2012 11:44 AM, 9/29/2011, 3/1/2011, 3/1/2011, 10/6/2010 Pneumococcal Conjugate (PCV-13) 8/19/2015  2:46 PM  
 Pneumococcal Polysaccharide (PPSV-23) 5/15/2018 Td 1/11/2013 12:00 AM  
 Zoster Vaccine, Live 1/1/2016 Not reviewed this visit You Were Diagnosed With   
  
 Codes Comments Essential hypertension    -  Primary ICD-10-CM: I10 
ICD-9-CM: 401.9 Primary osteoarthritis of both knees     ICD-10-CM: M17.0 ICD-9-CM: 715.16   
 Closed fracture of right ankle, initial encounter     ICD-10-CM: S82.891A ICD-9-CM: 824.8 Age-related osteoporosis with current pathological fracture, initial encounter     ICD-10-CM: M80.00XA ICD-9-CM: 733.10, 733.01 Vitals BP Pulse Temp Height(growth percentile) SpO2 OB Status 120/73 70 97.1 °F (36.2 °C) (Oral) 5' 1\" (1.549 m) 98% Hysterectomy Smoking Status Never Smoker Vitals History Preferred Pharmacy Pharmacy Name Phone [facility], [address] [phone] Your Updated Medication List  
  
   
This list is accurate as of 7/10/18  1:42 PM.  Always use your most recent med list.  
  
  
  
  
 aspirin 81 mg chewable tablet Commonly known as:  ASPIRIN LOW-STRENGTH  
CHEW AND SWALLOW ONE TABLET BY MOUTH DAILY  
  
 atorvastatin 10 mg tablet Commonly known as:  LIPITOR Take 1 Tab by mouth daily. ergocalciferol 50,000 unit capsule Commonly known as:  ERGOCALCIFEROL Take 1 Cap by mouth every seven (7) days. FREESTYLE LANCETS 28 gauge Misc Generic drug:  lancets DAILY USE FREESTYLE TEST strip Generic drug:  glucose blood VI test strips PATIENT TESTING BLOOD SUGAR ONCE DAILY losartan 50 mg tablet Commonly known as:  COZAAR Take 1 Tab by mouth daily. TYLENOL 325 mg tablet Generic drug:  acetaminophen Take  by mouth every four (4) hours as needed for Pain. Follow-up Instructions Return in about 3 months (around 10/10/2018) for follow up. Introducing [facility]! [facility] introduces Nearbox patient portal. Now you can access parts of your medical record, email your doctor's office, and request medication refills online. 1. In your internet browser, go to [email or web address] 2. Click on the First Time User? Click Here link in the Sign In box. You will see the New Member Sign Up page. 3. Enter your Paktor Access Code exactly as it appears below. You will not need to use this code after youve completed the sign-up process. If you do not sign up before the expiration date, you must request a new code. · Pak[employer] Access Code: [email or web address] Expires: 8/9/2018 11:52 AM 
 
4. Enter the last four digits of your Social Security Number (xxxx) and Date of Birth (mm/dd/yyyy) as indicated and click Submit. You will be taken to the next sign-up page. 5. Create a Paktor ID. This will be your Paktor login ID and cannot be changed, so think of one that is secure and easy to remember. 6. Create a Paktor password. You can change your password at any time. 7. Enter your Password Reset Question and Answer. This can be used at a later time if you forget your password. 8. Enter your e-mail address. You will receive e-mail notification when new information is available in [address] 9. Click Sign Up. You can now view and download portions of your medical record. 10. Click the Download Summary menu link to download a portable copy of your medical information. If you have questions, please visit the Frequently Asked Questions section of the [employer] website. Remember, [employer]tor is NOT to be used for urgent needs. For medical emergencies, dial 911. Now available from your iPhone and Android! Please provide this summary of care documentation to your next provider. Your primary care clinician is listed as Donny Galvez. If you have any questions after today's visit, please call [phone].

## 2018-07-30 ENCOUNTER — TELEPHONE (OUTPATIENT)
Dept: FAMILY MEDICINE CLINIC | Age: 83
End: 2018-07-30

## 2018-07-30 NOTE — TELEPHONE ENCOUNTER
Patient is requesting the ENT referral as discussed in 2 prior appointments. No mention of requests in office notes. Please place in chart.

## 2018-08-09 DIAGNOSIS — H92.01 RIGHT EAR PAIN: Primary | ICD-10-CM

## 2018-09-21 ENCOUNTER — PATIENT OUTREACH (OUTPATIENT)
Dept: FAMILY MEDICINE CLINIC | Age: 83
End: 2018-09-21

## 2018-09-21 RX ORDER — METOPROLOL TARTRATE 25 MG/1
25 TABLET, FILM COATED ORAL 2 TIMES DAILY
COMMUNITY
Start: 2018-09-19 | End: 2018-10-18 | Stop reason: SDUPTHER

## 2018-09-21 NOTE — PROGRESS NOTES
Nurse Navigator Hospital Follow Up Note 
-18 Admitted at [facility] for paroxymal tachycardia 
-18 Discharged to home  
-18 NN contact Hospital Discharge Follow-Up Date/Time:  2018 4:51 PM 
 
Patient was admitted to [facility] on 19 and discharged on 18 for paroxymal tachycardia. The physician discharge summary was available at the time of outreach. Patient was contacted within 2 business days of discharge. Top Challenges reviewed with the provider  
-paroxymal tachycardia, f/u with Cardio -HTN Method of communication with provider :chart routing Inpatient RRAT score: n/a Was this a readmission? no  
Patient stated reason for the readmission: n/a Nurse Navigator (NN) contacted the patient by telephone to perform post hospital discharge assessment. Verified name and  with patient as identifiers. Provided introduction to self, and explanation of the Nurse Navigator role. Pt reports she is doing \"really good. \" Pt denies and CP, palpitations, SOB, fever/chills, HA/dizzines, numbness/tingling, swelling, bleeding, or recent fall. Pt reports she has assistance at home, her granddaughter lives with her. Her daughter will be taking her to the PCP office Reviewed discharge instructions and red flags with patient who verbalized understanding. Patient given an opportunity to ask questions and does not have any further questions or concerns at this time. The patient agrees to contact the PCP office for questions related to their healthcare. NN provided contact information for future reference. Disease Specific:   N/A Summary of patient's top problems: 1. Paroxymal tachycardia 2. HTN 3. Home Health orders at discharge: none [address]: n/a Date of initial visit: n/a Durable Medical Equipment ordered/company: walker Durable Medical Equipment received: yes Barriers to care? none identified at this time Advance Care Planning:  
Does patient have an Advance Directive:  not on file Medication(s):  
New Medications at Discharge: metoprolol, Changed Medications at Discharge: n/a Discontinued Medications at Discharge: n/a Medication reconciliation was performed with patient, who verbalizes understanding of administration of home medications. There were no barriers to obtaining medications identified at this time. Referral to Pharm D needed: no  
 
Current Outpatient Prescriptions Medication Sig  
 metoprolol tartrate (LOPRESSOR) 25 mg tablet Take 25 mg by mouth two (2) times a day.  ergocalciferol (ERGOCALCIFEROL) 50,000 unit capsule Take 1 Cap by mouth every seven (7) days.  atorvastatin (LIPITOR) 10 mg tablet Take 1 Tab by mouth daily.  aspirin (ASPIRIN LOW-STRENGTH) 81 mg chewable tablet CHEW AND SWALLOW ONE TABLET BY MOUTH DAILY  losartan (COZAAR) 50 mg tablet Take 1 Tab by mouth daily.  acetaminophen (TYLENOL) 325 mg tablet Take  by mouth every four (4) hours as needed for Pain.  FREESTYLE TEST strip PATIENT TESTING BLOOD SUGAR ONCE DAILY  FREESTYLE LANCETS 28 gauge misc DAILY USE No current facility-administered medications for this visit. There are no discontinued medications. [facility] follow up appointment(s): Future Appointments Date Time Provider Meredith Francisco 9/25/2018 1:15 PM Merlinda Larsen, NP AMA ATHENA SCHED  
10/4/2018 9:15 AM Leonor Keys [place] [address] [facility] regarding an earlier availability appt with Dr. Maribell Butler. Per PSR, no availabilty. An appt made with ALEXIA Gage Patient aware and notified of appt with ALEXIA Gage. She verbalized understanding. Non-[facility] follow up appointment(s): Aubrey Dixon 11/02/18 at 12:45pm 
[facility]:  n/a  
 
Goals  Prevent complications post hospitalization. -pt will follow up at PCP office 
-pt will f/u at [address] on 11/02/18 -pt will take all prescribed meds as directed 
-pt will recognize red flags and contact PCP office or seek emergency assistance

## 2018-09-21 NOTE — Clinical Note
Hello,  I am a nurse navigator and I contacted one of Dr. Annika Callahan patients for hospital f/u  -Patient has a f/u appt scheduled with NP Ms. Kevin De Santiago on 09/25/18. There was no earlier availability for Dr. Annika Callahan schedule. Patient is a [facility] [id number]  -admitted at [facility] for praxoymal tachycardia 09/18-09/19 -discharged with metoprolol 25mg BID -during the phone conversation, pt denies any CP, SOB, palpitations, HA/dizziness, swelling, falls. She reports feeling good and has no concerns at this time -[facility] has scheduled her a f/u appt on 11/02/18 at 12:45pm with CATHY Springer -pt lives with her granddaughter, but the daughter will be taking her to the appt tomorrow -just in case, I did fax a copy of the DC summary to your office  Please let me know if you have any questions. Thanks!

## 2018-09-25 ENCOUNTER — OFFICE VISIT (OUTPATIENT)
Dept: FAMILY MEDICINE CLINIC | Age: 83
End: 2018-09-25

## 2018-09-25 VITALS
RESPIRATION RATE: 16 BRPM | WEIGHT: 144 LBS | HEART RATE: 63 BPM | DIASTOLIC BLOOD PRESSURE: 95 MMHG | TEMPERATURE: 96 F | HEIGHT: 61 IN | SYSTOLIC BLOOD PRESSURE: 177 MMHG | OXYGEN SATURATION: 98 % | BODY MASS INDEX: 27.19 KG/M2

## 2018-09-25 DIAGNOSIS — R00.0 TACHYCARDIA: Primary | ICD-10-CM

## 2018-09-25 DIAGNOSIS — Z23 ENCOUNTER FOR IMMUNIZATION: ICD-10-CM

## 2018-09-25 NOTE — PATIENT INSTRUCTIONS
Keep a blood pressure log  Call on Friday if blood pressures remain elevated, for example top number in the 170-180's, otherwise, bring lod to visit with Dr. Nikolai Marrufo About Rhythm-Control Medicines  Introduction    Rhythm-control medicines return your heart to a normal rhythm. And they keep it at a normal rhythm. They are also called antiarrhythmics. Doctors may use these medicines if:  · You have bad symptoms. · You had electric cardioversion. Or you will have it. · You tried medicine to control your heart rate. But it did not help. These medicines can have serious side effects. Examples  · Amiodarone (Cordarone, Pacerone)  · Dofetilide (Tikosyn)  · Propafenone (Rythmol)  · Sotalol (Betapace AF)  Possible side effects  Side effects depend on which medicine you take. One serious one is a very irregular heart rate. Read the information that comes with your medicine. What to know about taking this medicine  · You may be in the hospital when you start this medicine. Your doctor will watch what it does to your heart. · Be sure to ask your doctor any questions. You may want to know more about the pros and cons of the medicine. · Your doctor will check you often. He or she will make sure you are not having problems. Be sure to go to all of your visits. · You may need regular blood tests. These make sure you are taking the right amount of medicine. · Take your medicines exactly as prescribed. Call your doctor if you think you are having a problem with your medicine. · Check with your doctor or pharmacist before you use any other medicines. This includes over-the-counter medicines. Make sure your doctor knows all of the medicines, vitamins, herbal products, and supplements you take. Taking some medicines together can cause problems. Where can you learn more? Go to http://kosta-angle.info/.   Enter A884 in the search box to learn more about \"Learning About Rhythm-Control Medicines. \"  Current as of: December 6, 2017  Content Version: 11.7  © 1001-9126 [employer]. Care instructions adapted under license by [employer] (which disclaims liability or warranty for this information). If you have questions about a medical condition or this instruction, always ask your healthcare professional. [address] any warranty or liability for your use of this information. Influenza (Flu) Vaccine (Inactivated or Recombinant): What You Need to Know  Why get vaccinated? Influenza (\"flu\") is a contagious disease that spreads around the [place] every winter, usually between October and May. Flu is caused by influenza viruses and is spread mainly by coughing, sneezing, and close contact. Anyone can get flu. Flu strikes suddenly and can last several days. Symptoms vary by age, but can include:  · Fever/chills. · Sore throat. · Muscle aches. · Fatigue. · Cough. · Headache. · Runny or stuffy nose. Flu can also lead to pneumonia and blood infections, and cause diarrhea and seizures in children. If you have a medical condition, such as heart or lung disease, flu can make it worse. Flu is more dangerous for some people. Infants and young children, people 72years of age and older, pregnant women, and people with certain health conditions or a weakened immune system are at greatest risk. Each year thousands of people in the [facility] die from flu, and many more are hospitalized. Flu vaccine can:  · Keep you from getting flu. · Make flu less severe if you do get it. · Keep you from spreading flu to your family and other people. Inactivated and recombinant flu vaccines  A dose of flu vaccine is recommended every flu season. Children 6 months through 6years of age may need two doses during the same flu season. Everyone else needs only one dose each flu season.   Some inactivated flu vaccines contain a very small amount of a mercury-based preservative called thimerosal. Studies have not shown thimerosal in vaccines to be harmful, but flu vaccines that do not contain thimerosal are available. There is no live flu virus in flu shots. They cannot cause the flu. There are many flu viruses, and they are always changing. Each year a new flu vaccine is made to protect against three or four viruses that are likely to cause disease in the upcoming flu season. But even when the vaccine doesn't exactly match these viruses, it may still provide some protection. Flu vaccine cannot prevent:  · Flu that is caused by a virus not covered by the vaccine. · Illnesses that look like flu but are not. Some people should not get this vaccine  Tell the person who is giving you the vaccine:  · If you have any severe (life-threatening) allergies. If you ever had a life-threatening allergic reaction after a dose of flu vaccine, or have a severe allergy to any part of this vaccine, you may be advised not to get vaccinated. Most, but not all, types of flu vaccine contain a small amount of egg protein. · If you ever had Guillain-Barré syndrome (also called GBS) Some people with a history of GBS should not get this vaccine. This should be discussed with your doctor. · If you are not feeling well. It is usually okay to get flu vaccine when you have a mild illness, but you might be asked to come back when you feel better. Risks of a vaccine reaction  With any medicine, including vaccines, there is a chance of reactions. These are usually mild and go away on their own, but serious reactions are also possible. Most people who get a flu shot do not have any problems with it. Minor problems following a flu shot include:  · Soreness, redness, or swelling where the shot was given  · Hoarseness  · Sore, red or itchy eyes  · Cough  · Fever  · Aches  · Headache  · Itching  · Fatigue  If these problems occur, they usually begin soon after the shot and last 1 or 2 days.   More serious problems following a flu shot can include the following:  · There may be a small increased risk of Guillain-Barré Syndrome (GBS) after inactivated flu vaccine. This risk has been estimated at 1 or 2 additional cases per million people vaccinated. This is much lower than the risk of severe complications from flu, which can be prevented by flu vaccine. · Ana Cristina Peralta children who get the flu shot along with pneumococcal vaccine (PCV13) and/or DTaP vaccine at the same time might be slightly more likely to have a seizure caused by fever. Ask your doctor for more information. Tell your doctor if a child who is getting flu vaccine has ever had a seizure  Problems that could happen after any injected vaccine:  · People sometimes faint after a medical procedure, including vaccination. Sitting or lying down for about 15 minutes can help prevent fainting, and injuries caused by a fall. Tell your doctor if you feel dizzy, or have vision changes or ringing in the ears. · Some people get severe pain in the shoulder and have difficulty moving the arm where a shot was given. This happens very rarely. · Any medication can cause a severe allergic reaction. Such reactions from a vaccine are very rare, estimated at about 1 in a million doses, and would happen within a few minutes to a few hours after the vaccination. As with any medicine, there is a very remote chance of a vaccine causing a serious injury or death. The safety of vaccines is always being monitored. For more information, visit: www.cdc.gov/vaccinesafety/. What if there is a serious reaction? What should I look for? · Look for anything that concerns you, such as signs of a severe allergic reaction, very high fever, or unusual behavior. Signs of a severe allergic reaction can include hives, swelling of the face and throat, difficulty breathing, a fast heartbeat, dizziness, and weakness - usually within a few minutes to a few hours after the vaccination.   What should I do?  · If you think it is a severe allergic reaction or other emergency that can't wait, call 9-1-1 and get the person to the nearest hospital. Otherwise, call your doctor. · Reactions should be reported to the \"Vaccine Adverse Event Reporting System\" (VAERS). Your doctor should file this report, or you can do it yourself through the VAERS website at www.vaers. [facility].gov, or by calling 0-700.308.2060. VAERS does not give medical advice. The National Vaccine Injury Compensation Program  The National Vaccine Injury Compensation Program (VICP) is a federal program that was created to compensate people who may have been injured by certain vaccines. Persons who believe they may have been injured by a vaccine can learn about the program and about filing a claim by calling 9-314.669.8189 or visiting the [employer] website at www.[facility].gov/vaccinecompensation. There is a time limit to file a claim for compensation. How can I learn more? · Ask your healthcare provider. He or she can give you the vaccine package insert or suggest other sources of information. · Call your local or state health department. · Contact the Centers for Disease Control and Prevention (CDC):  ¨ Call 1-174.703.1747 (1-800-CDC-INFO) or  ¨ Visit CDC's website at www.cdc.gov/flu  Vaccine Information Statement  Inactivated Influenza Vaccine  8/7/2015)  [address]ez [address] 239TN-81  Department of Health and Human Services  Centers for Disease Control and Prevention  Many Vaccine Information Statements are available in Citizen of [place] and other languages. See www.immunize.org/vis. Muchas hojas de información sobre vacunas están disponibles en español y en otros idiomas. Visite www.immunize.org/vis. Care instructions adapted under license by [employer] (which disclaims liability or warranty for this information).  If you have questions about a medical condition or this instruction, always ask your healthcare professional. [address] any warranty or liability for your use of this information.

## 2018-09-25 NOTE — PROGRESS NOTES
1. Have you been to the ER, urgent care clinic since your last visit? Hospitalized since your last visit? Yes [facility] on [facility]- for dizzness     2. Hav  e you seen or consulted any other health care providers outside of the [facility] since your last visit? Include any pap smears or colon screening.   NO    Chief Complaint   Patient presents with    Follow-up     Patient sent to ER on 9- for Dizzness     Referral Follow Up      patient request ENT to right side face pain

## 2018-09-25 NOTE — PROGRESS NOTES
Pt is a 80y.o. year old female who presents for transition of care. Hospitalization summary; Hospital Course: ( from discharge summary)  presents with heart chest pain, palpitations, and lightheadness. Patient states that she has been having chest problems on and off for the past year refused her stress test in 2017. Patient states that today she has had increased heart racing and sob. Discomfort today did not resolve as usual. Patient also states that with todays episode she had increased neck pulsating, with right sided facial pain. Patient states that she had lightheadedness, and dizziness at the time. Family states they brought her to ED for evaluation and treatment  The patient admitted for the following Principal Medical Problem:   Paroxysmal SVT- cardiology consulted and recommendation , continue metoprolol for rate control management. Recommend goal K+>4, Mg+>2. Check TSH, and out patient follow up scheduled by cardiology   Chest discomfort- most likely 2/2 SVT. CEsx2 negative. Echo and NST has been ordered result as below   Hypertenison- on losartan at home which changed to metoprolol      Test results:  NST: No diagnostic abnormality, EF 64 %. D/w cardiology who ok with d/c plan today on BB and outpatient follow up        ROS:  Review of Systems   Constitutional: Negative. Eyes: Negative. Respiratory: Negative. Cardiovascular: Negative. Gastrointestinal: Negative. Genitourinary: Negative. Musculoskeletal:        Walking boot on rle 2nd ankle fx in june   Skin: Negative. Neurological: Negative. Psychiatric/Behavioral: Negative.         Date admitted: 9/18/2018  Date discharged: 9/19/2018    Date of first interactive contact:  Within 2 days of discharge by nurse navigator    Date of face to face: 9/25/18    Medication reconciliation performed: Yes    Medications added/changed/discontinued: Liss Seats, Start Lopressor 25mg once a day    Review discharge instructions: Yes    Need for follow up on in hospital testing: No    Need for follow up with specialist: Yes  Name of specialist: Cardiology    Does patient have help at home: Yes  Name: John Eaton, g-daughter and Matthias Geiger, daughter    Barriers to obtaining medications: No    Patient/family educated on cause of hospitalization: Yes    Patient/family able to repeat back cause of hospitalization, disease process, medication changes, and when to seek help: Yes    Patient Past Records were reviewed:  yes    Vitals:    09/25/18 1311   BP: (!) 177/95   Pulse: 63   Resp: 16   Temp: 96 °F (35.6 °C)   TempSrc: Oral   SpO2: 98%   Weight: 144 lb (65.3 kg)   Height: 5' 1\" (1.549 m)      Body mass index is 27.21 kg/(m^2). Physical assessment:  Physical Exam        Patient Active Problem List   Diagnosis Code    Essential hypertension I10    Osteoporosis without current pathological fracture M81.0    Hyperlipidemia E78.5    Primary osteoarthritis of both knees M17.0    Advanced directives, counseling/discussion Z71.89    Overweight (BMI 25.0-29. 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      Social History Main Topics    Smoking status: Never Smoker    Smokeless tobacco: Never Used    Alcohol use No    Drug use: No    Sexual activity: Not on file     Other Topics Concern    Not on file     Social History Narrative     Family History   Problem Relation Age of Onset    Kidney Disease Mother     Hypertension Sister     Kidney Disease Brother        Current Outpatient Prescriptions   Medication Sig Dispense Refill    Blood-Glucose Meter (ACCU-CHEK SALLIE PLUS METER) misc accu check sallie plus meter DX: E11.65      metoprolol tartrate (LOPRESSOR) 25 mg tablet Take 25 mg by mouth two (2) times a day.  ergocalciferol (ERGOCALCIFEROL) 50,000 unit capsule Take 1 Cap by mouth every seven (7) days. 12 Cap 1    atorvastatin (LIPITOR) 10 mg tablet Take 1 Tab by mouth daily. 90 Tab 3    acetaminophen (TYLENOL) 325 mg tablet Take  by mouth every four (4) hours as needed for Pain.  aspirin (ASPIRIN LOW-STRENGTH) 81 mg chewable tablet CHEW AND SWALLOW ONE TABLET BY MOUTH DAILY 90 Tab 1    losartan (COZAAR) 50 mg tablet Take 1 Tab by mouth daily. 90 Tab 3    FREESTYLE TEST strip PATIENT TESTING BLOOD SUGAR ONCE DAILY 1 Package 10    FREESTYLE LANCETS 28 gauge misc DAILY USE 1 Package 10       History   Smoking Status    Never Smoker   Smokeless Tobacco    Never Used       No Known Allergies    Assessment/ Plan:   Diagnoses and all orders for this visit:    1. Tachycardia    2. Transition of care performed with sharing of clinical summary    3. Encounter for immunization  -     Influenza Vaccine Inactivated (IIV)(FLUAD), Subunit, Adjuvanted, IM, (64192)  -     Administration fee () for Medicare insured patients  -     ADMIN PNEUMOCOCCAL VACCINE  Medicare Injection Admin Charge      Follow-up Disposition:  Return if symptoms worsen or fail to improve. Mrs. Mccord blood pressure was elevated at todays visit. Asked her and her daughter to keep a log and let me know on Friday what the readings are. May need to restart cozaar at 1/2 dose. I have discussed the diagnosis with the patient and the intended plan as seen in the above orders. The patient has received an After-Visit Summary and questions were answered concerning future plans.      Medication Side Effects and Warnings were discussed with patient: yes      Return to clinic if sxs persist, to ER if sxs worsen    Patient verbalized understanding of above instructions. AVS printed and given to pt. JAMEEL Sandoval-BC  [address] [facility]   [address] [facility] [address]  [id number]

## 2018-09-25 NOTE — MR AVS SNAPSHOT
[address] [facility] [address] [id number] 
[phone] Patient: Alba Pringle MRN: [id number]:[id number] Visit Information Date & Time Provider Department Dept. Phone Encounter #  
 9/25/2018  1:15 PM Nick Adan, [address] [facility] [phone] [id number] Follow-up Instructions Return if symptoms worsen or fail to improve. Your Appointments 10/4/2018  9:15 AM  
Office Visit with Jaquelin Woodall MD  
[place] [address] ([facility]) Appt Note: 295 [facility] 68 [facility] [facility] [address]  
  
   
 [address] [place] [address] [address] Upcoming Health Maintenance Date Due Shingrix Vaccine Age 50> (1 of 2) 12/30/1979 DTaP/Tdap/Td series (1 - Tdap) 1/12/2013 Influenza Age 5 to Adult 8/1/2018 MEDICARE YEARLY EXAM 10/6/2018 HEMOGLOBIN A1C Q6M 11/15/2018 EYE EXAM RETINAL OR DILATED Q1 2/8/2019 LIPID PANEL Q1 2/26/2019 FOOT EXAM Q1 5/15/2019 MICROALBUMIN Q1 5/15/2019 GLAUCOMA SCREENING Q2Y 2/8/2020 Allergies as of 9/25/2018  Review Complete On: 7/10/2018 By: Jaquelin Woodall MD  
 No Known Allergies Current Immunizations  Reviewed on 5/15/2018 Name Date Influenza High Dose Vaccine PF 10/31/2017, 11/1/2016 Influenza Vaccine 11/4/2014  9:37 AM, 10/10/2013 Influenza Vaccine (Quad) 11/3/2015  9:58 AM  
 Influenza Vaccine (Tri) Adjuvanted  Incomplete Influenza Vaccine Split 10/18/2012 11:44 AM, 9/29/2011, 3/1/2011, 3/1/2011, 10/6/2010 Pneumococcal Conjugate (PCV-13) 8/19/2015  2:46 PM  
 Pneumococcal Polysaccharide (PPSV-23) 5/15/2018 Td 1/11/2013 12:00 AM  
 Zoster Vaccine, Live 1/1/2016 Not reviewed this visit You Were Diagnosed With   
  
 Codes Comments Tachycardia    -  Primary ICD-10-CM: R00.0 ICD-9-CM: 785.0 Transition of care performed with sharing of clinical summary     ICD-10-CM: Z91.89 ICD-9-CM: V15.89 Encounter for immunization     ICD-10-CM: N00 ICD-9-CM: V03.89 Vitals BP Pulse Temp Resp Height(growth percentile) Weight(growth percentile) (!) 177/95 63 96 °F (35.6 °C) (Oral) 16 5' 1\" (1.549 m) 144 lb (65.3 kg) SpO2 BMI OB Status Smoking Status 98% 27.21 kg/m2 Hysterectomy Never Smoker BMI and BSA Data Body Mass Index Body Surface Area  
 27.21 kg/m 2 1.68 m 2 Preferred Pharmacy Pharmacy Name Phone [facility], [address] [phone] Your Updated Medication List  
  
   
This list is accurate as of 9/25/18  1:42 PM.  Always use your most recent med list.  
  
  
  
  
 aspirin 81 mg chewable tablet Commonly known as:  ASPIRIN LOW-STRENGTH  
CHEW AND SWALLOW ONE TABLET BY MOUTH DAILY  
  
 atorvastatin 10 mg tablet Commonly known as:  LIPITOR Take 1 Tab by mouth daily. ergocalciferol 50,000 unit capsule Commonly known as:  ERGOCALCIFEROL Take 1 Cap by mouth every seven (7) days. FREESTYLE LANCETS 28 gauge Misc Generic drug:  lancets DAILY USE FREESTYLE TEST strip Generic drug:  glucose blood VI test strips PATIENT TESTING BLOOD SUGAR ONCE DAILY losartan 50 mg tablet Commonly known as:  COZAAR Take 1 Tab by mouth daily. metoprolol tartrate 25 mg tablet Commonly known as:  LOPRESSOR Take 25 mg by mouth two (2) times a day. TYLENOL 325 mg tablet Generic drug:  acetaminophen Take  by mouth every four (4) hours as needed for Pain. We Performed the Following ADMIN INFLUENZA VIRUS VAC [ [facility]] ADMIN PNEUMOCOCCAL VACCINE [ [facility]] INFLUENZA VACCINE INACTIVATED (IIV), SUBUNIT, ADJUVANTED, IM [id number] CPT(R)] Follow-up Instructions Return if symptoms worsen or fail to improve. Patient Instructions Keep a blood pressure log Call on Friday if blood pressures remain elevated, for example top number in the 170-180's, otherwise, bring lod to visit with Dr. Mariah Goodman Learning About Rhythm-Control Medicines Introduction Rhythm-control medicines return your heart to a normal rhythm. And they keep it at a normal rhythm. They are also called antiarrhythmics. Doctors may use these medicines if: 
· You have bad symptoms. · You had electric cardioversion. Or you will have it. · You tried medicine to control your heart rate. But it did not help. These medicines can have serious side effects. Examples · Amiodarone (Cordarone, Pacerone) · Dofetilide (Tikosyn) · Propafenone (Rythmol) · Sotalol (Betapace AF) Possible side effects Side effects depend on which medicine you take. One serious one is a very irregular heart rate. Read the information that comes with your medicine. What to know about taking this medicine · You may be in the hospital when you start this medicine. Your doctor will watch what it does to your heart. · Be sure to ask your doctor any questions. You may want to know more about the pros and cons of the medicine. · Your doctor will check you often. He or she will make sure you are not having problems. Be sure to go to all of your visits. · You may need regular blood tests. These make sure you are taking the right amount of medicine. · Take your medicines exactly as prescribed. Call your doctor if you think you are having a problem with your medicine. · Check with your doctor or pharmacist before you use any other medicines. This includes over-the-counter medicines. Make sure your doctor knows all of the medicines, vitamins, herbal products, and supplements you take. Taking some medicines together can cause problems. Where can you learn more? Go to http://kosta-angle.info/. Enter B017 in the search box to learn more about \"Learning About Rhythm-Control Medicines. \" Current as of: December 6, 2017 Content Version: 11.7 © 3066-6793 [employer]. Care instructions adapted under license by [employer] (which disclaims liability or warranty for this information). If you have questions about a medical condition or this instruction, always ask your healthcare professional. [place] [address] any warranty or liability for your use of this information. Influenza (Flu) Vaccine (Inactivated or Recombinant): What You Need to Know Why get vaccinated? Influenza (\"flu\") is a contagious disease that spreads around the [place] every winter, usually between October and May. Flu is caused by influenza viruses and is spread mainly by coughing, sneezing, and close contact. Anyone can get flu. Flu strikes suddenly and can last several days. Symptoms vary by age, but can include: · Fever/chills. · Sore throat. · Muscle aches. · Fatigue. · Cough. · Headache. · Runny or stuffy nose. Flu can also lead to pneumonia and blood infections, and cause diarrhea and seizures in children. If you have a medical condition, such as heart or lung disease, flu can make it worse. Flu is more dangerous for some people. Infants and young children, people 72years of age and older, pregnant women, and people with certain health conditions or a weakened immune system are at greatest risk. Each year thousands of people in the [facility] die from flu, and many more are hospitalized. Flu vaccine can: · Keep you from getting flu. · Make flu less severe if you do get it. · Keep you from spreading flu to your family and other people. Inactivated and recombinant flu vaccines A dose of flu vaccine is recommended every flu season. Children 6 months through 6years of age may need two doses during the same flu season. Everyone else needs only one dose each flu season. Some inactivated flu vaccines contain a very small amount of a mercury-based preservative called thimerosal. Studies have not shown thimerosal in vaccines to be harmful, but flu vaccines that do not contain thimerosal are available. There is no live flu virus in flu shots. They cannot cause the flu. There are many flu viruses, and they are always changing. Each year a new flu vaccine is made to protect against three or four viruses that are likely to cause disease in the upcoming flu season. But even when the vaccine doesn't exactly match these viruses, it may still provide some protection. Flu vaccine cannot prevent: · Flu that is caused by a virus not covered by the vaccine. · Illnesses that look like flu but are not. Some people should not get this vaccine Tell the person who is giving you the vaccine: · If you have any severe (life-threatening) allergies. If you ever had a life-threatening allergic reaction after a dose of flu vaccine, or have a severe allergy to any part of this vaccine, you may be advised not to get vaccinated. Most, but not all, types of flu vaccine contain a small amount of egg protein. · If you ever had Guillain-Barré syndrome (also called GBS) Some people with a history of GBS should not get this vaccine. This should be discussed with your doctor. · If you are not feeling well. It is usually okay to get flu vaccine when you have a mild illness, but you might be asked to come back when you feel better. Risks of a vaccine reaction With any medicine, including vaccines, there is a chance of reactions. These are usually mild and go away on their own, but serious reactions are also possible. Most people who get a flu shot do not have any problems with it. Minor problems following a flu shot include: · Soreness, redness, or swelling where the shot was given · Hoarseness · Sore, red or itchy eyes · Cough · Fever · Aches · Headache · Itching · Fatigue If these problems occur, they usually begin soon after the shot and last 1 or 2 days. More serious problems following a flu shot can include the following: · There may be a small increased risk of Guillain-Barré Syndrome (GBS) after inactivated flu vaccine. This risk has been estimated at 1 or 2 additional cases per million people vaccinated. This is much lower than the risk of severe complications from flu, which can be prevented by flu vaccine. · [employer] children who get the flu shot along with pneumococcal vaccine (PCV13) and/or DTaP vaccine at the same time might be slightly more likely to have a seizure caused by fever. Ask your doctor for more information. Tell your doctor if a child who is getting flu vaccine has ever had a seizure Problems that could happen after any injected vaccine: · People sometimes faint after a medical procedure, including vaccination. Sitting or lying down for about 15 minutes can help prevent fainting, and injuries caused by a fall. Tell your doctor if you feel dizzy, or have vision changes or ringing in the ears. · Some people get severe pain in the shoulder and have difficulty moving the arm where a shot was given. This happens very rarely. · Any medication can cause a severe allergic reaction. Such reactions from a vaccine are very rare, estimated at about 1 in a million doses, and would happen within a few minutes to a few hours after the vaccination. As with any medicine, there is a very remote chance of a vaccine causing a serious injury or death. The safety of vaccines is always being monitored. For more information, visit: www.cdc.gov/vaccinesafety/. What if there is a serious reaction? What should I look for? · Look for anything that concerns you, such as signs of a severe allergic reaction, very high fever, or unusual behavior.  
Signs of a severe allergic reaction can include hives, swelling of the face and throat, difficulty breathing, a fast heartbeat, dizziness, and weakness - usually within a few minutes to a few hours after the vaccination. What should I do? · If you think it is a severe allergic reaction or other emergency that can't wait, call 9-1-1 and get the person to the nearest hospital. Otherwise, call your doctor. · Reactions should be reported to the \"Vaccine Adverse Event Reporting System\" (VAERS). Your doctor should file this report, or you can do it yourself through the VAERS website at www.vaers. [facility].gov, or by calling 8-992.651.5452. VAERS does not give medical advice. The National Vaccine Injury Compensation Program 
The National Vaccine Injury Compensation Program (VICP) is a federal program that was created to compensate people who may have been injured by certain vaccines. Persons who believe they may have been injured by a vaccine can learn about the program and about filing a claim by calling 1-330.686.6137 or visiting the [employer] website at www.[facility].gov/vaccinecompensation. There is a time limit to file a claim for compensation. How can I learn more? · Ask your healthcare provider. He or she can give you the vaccine package insert or suggest other sources of information. · Call your local or state health department. · Contact the Centers for Disease Control and Prevention (CDC): 
¨ Call 0-709.535.9556 (1-800-CDC-INFO) or ¨ Visit CDC's website at www.cdc.gov/flu Vaccine Information Statement Inactivated Influenza Vaccine 8/7/2015) [address] [id number] [facility] and "[employer] Centers for Disease Control and Prevention Many Vaccine Information Statements are available in [place] and other languages. See www.immunize.org/vis. Muchas hojas de información sobre vacunas están disponibles en español y en otros idiomas. Visite www.immunize.org/vis.  
Care instructions adapted under license by [employer] (which disclaims liability or warranty for this information). If you have questions about a medical condition or this instruction, always ask your healthcare professional. [address] any warranty or liability for your use of this information. Introducing [facility]! Olive Bejarano introduces [employer] patient portal. Now you can access parts of your medical record, email your doctor's office, and request medication refills online. 1. In your internet browser, go to [email or web address] Do [email or web address] 2. Click on the First Time User? Click Here link in the Sign In box. You will see the New Member Sign Up page. 3. Enter your [employer] Access Code exactly as it appears below. You will not need to use this code after youve completed the sign-up process. If you do not sign up before the expiration date, you must request a new code. · [employer] Access Code: [email or web address] Expires: 12/24/2018  1:42 PM 
 
4. Enter the last four digits of your Social Security Number (xxxx) and Date of Birth (mm/dd/yyyy) as indicated and click Submit. You will be taken to the next sign-up page. 5. Create a [employer] ID. This will be your [employer] login ID and cannot be changed, so think of one that is secure and easy to remember. 6. Create a [employer] password. You can change your password at any time. 7. Enter your Password Reset Question and Answer. This can be used at a later time if you forget your password. 8. Enter your e-mail address. You will receive e-mail notification when new information is available in [address] 9. Click Sign Up. You can now view and download portions of your medical record. 10. Click the Download Summary menu link to download a portable copy of your medical information. If you have questions, please visit the Frequently Asked Questions section of the [employer] website. Remember, [employer] is NOT to be used for urgent needs. For medical emergencies, dial 911. Now available from your iPhone and Android! Please provide this summary of care documentation to your next provider. Your primary care clinician is listed as Faith Doss. If you have any questions after today's visit, please call [phone].

## 2018-09-26 ENCOUNTER — TELEPHONE (OUTPATIENT)
Dept: FAMILY MEDICINE CLINIC | Age: 83
End: 2018-09-26

## 2018-09-26 PROBLEM — I47.9 PAROXYSMAL TACHYCARDIA (HCC): Status: ACTIVE | Noted: 2018-09-18

## 2018-09-26 PROBLEM — E11.21 TYPE 2 DIABETES WITH NEPHROPATHY (HCC): Status: ACTIVE | Noted: 2018-09-26

## 2018-09-26 RX ORDER — BLOOD-GLUCOSE METER
EACH MISCELLANEOUS
COMMUNITY
Start: 2017-01-04 | End: 2021-04-29

## 2018-09-26 NOTE — TELEPHONE ENCOUNTER
----- Message from Jackson Mtz MD sent at 9/26/2018  2:46 PM EDT -----  pls call to see if she has home health seeing her after recent hospital discharge; I need them to check her BPs; if she does not pls ask if I can send a nurse to check her

## 2018-10-04 ENCOUNTER — OFFICE VISIT (OUTPATIENT)
Dept: FAMILY MEDICINE CLINIC | Age: 83
End: 2018-10-04

## 2018-10-04 VITALS
DIASTOLIC BLOOD PRESSURE: 70 MMHG | BODY MASS INDEX: 27.43 KG/M2 | HEIGHT: 61 IN | WEIGHT: 145.3 LBS | OXYGEN SATURATION: 98 % | RESPIRATION RATE: 18 BRPM | TEMPERATURE: 97.5 F | HEART RATE: 56 BPM | SYSTOLIC BLOOD PRESSURE: 120 MMHG

## 2018-10-04 DIAGNOSIS — Z71.89 ADVANCED DIRECTIVES, COUNSELING/DISCUSSION: ICD-10-CM

## 2018-10-04 DIAGNOSIS — I47.9 PAROXYSMAL TACHYCARDIA (HCC): ICD-10-CM

## 2018-10-04 DIAGNOSIS — B35.1 ONYCHOMYCOSIS OF TOENAIL: ICD-10-CM

## 2018-10-04 DIAGNOSIS — I10 ESSENTIAL HYPERTENSION: ICD-10-CM

## 2018-10-04 DIAGNOSIS — E66.3 OVERWEIGHT (BMI 25.0-29.9): ICD-10-CM

## 2018-10-04 DIAGNOSIS — E11.21 TYPE 2 DIABETES WITH NEPHROPATHY (HCC): ICD-10-CM

## 2018-10-04 DIAGNOSIS — E78.5 HYPERLIPIDEMIA, UNSPECIFIED HYPERLIPIDEMIA TYPE: ICD-10-CM

## 2018-10-04 DIAGNOSIS — Z00.00 MEDICARE ANNUAL WELLNESS VISIT, SUBSEQUENT: Primary | ICD-10-CM

## 2018-10-04 DIAGNOSIS — E11.65 TYPE 2 DIABETES MELLITUS WITH HYPERGLYCEMIA, WITHOUT LONG-TERM CURRENT USE OF INSULIN (HCC): ICD-10-CM

## 2018-10-04 NOTE — PROGRESS NOTES
Chief Complaint Patient presents with  Follow Up Chronic Condition Patient not fasting [address] [place] [address] Annual Wellness Visit 1. Have you been to the ER, urgent care clinic since your last visit? Hospitalized since your last visit? Yes Where: [facility] 2. Have you seen or consulted any other health care providers outside of the [address] [place] [address] since your last visit? Include any pap smears or colon screening.  No

## 2018-10-04 NOTE — ACP (ADVANCE CARE PLANNING)
Advance Care Planning (ACP) Provider Conversation Snapshot    Date of ACP Conversation: 10/04/18  Persons included in Conversation:  patient and family  Length of ACP Conversation in minutes:  <16 minutes (Non-Billable)    Authorized Decision Maker (if patient is incapable of making informed decisions):    This person is:   her daughter, Marco Adams          For Patients with Decision Making Capacity:   Values/Goals: Exploration of values, goals, and preferences if recovery is not expected, even with continued medical treatment in the event of:  Imminent death  Severe, permanent brain injury    Conversation Outcomes / Follow-Up Plan:   Recommended completion of Advance Directive form after review of ACP materials and conversation with prospective healthcare agent

## 2018-10-04 NOTE — MR AVS SNAPSHOT
Celeste Barclay [place] [address] [facility] [address] [facility] [address] [address] 
[phone] Patient: Alba Pringle MRN: [id number] HUL:[id number] Visit Information Date & Time Provider Department Dept. Phone Encounter #  
 10/4/2018  9:15 AM [facility] [id number] Follow-up Instructions Return in about 3 months (around 1/4/2019) for follow up. Upcoming Health Maintenance Date Due Shingrix Vaccine Age 50> (1 of 2) 12/30/1979 DTaP/Tdap/Td series (1 - Tdap) 1/12/2013 MEDICARE YEARLY EXAM 10/6/2018 HEMOGLOBIN A1C Q6M 11/15/2018 EYE EXAM RETINAL OR DILATED Q1 2/8/2019 LIPID PANEL Q1 2/26/2019 FOOT EXAM Q1 5/15/2019 MICROALBUMIN Q1 5/15/2019 GLAUCOMA SCREENING Q2Y 2/8/2020 Allergies as of 10/4/2018  Review Complete On: 10/4/2018 By: Liliane Mtz MD  
 No Known Allergies Current Immunizations  Reviewed on 10/4/2018 Name Date Influenza High Dose Vaccine PF 10/31/2017, 11/1/2016 Influenza Vaccine 11/4/2014  9:37 AM, 10/10/2013 Influenza Vaccine (Quad) 11/3/2015  9:58 AM  
 Influenza Vaccine (Tri) Adjuvanted 9/25/2018  1:45 PM  
 Influenza Vaccine Split 10/18/2012 11:44 AM, 9/29/2011, 3/1/2011, 3/1/2011, 10/6/2010 Pneumococcal Conjugate (PCV-13) 8/19/2015  2:46 PM  
 Pneumococcal Polysaccharide (PPSV-23) 5/15/2018 Td 1/11/2013 12:00 AM  
 Zoster Vaccine, Live 1/1/2016 Reviewed by Liliane Mtz MD on 10/4/2018 at  9:19 AM  
 Reviewed by Liliane Mtz MD on 10/4/2018 at  9:30 AM  
 Reviewed by Liliane Mtz MD on 10/4/2018 at  9:44 AM  
You Were Diagnosed With   
  
 Codes Comments Medicare annual wellness visit, subsequent    -  Primary ICD-10-CM: Z00.00 ICD-9-CM: V70.0 Paroxysmal tachycardia (HCC)     ICD-10-CM: I47.9 ICD-9-CM: 427.2  Type 2 diabetes with nephropathy (HCC)     ICD-10-CM: E11.21 
ICD-9-CM: 250.40, 583.81   
 Essential hypertension     ICD-10-CM: I10 
ICD-9-CM: 401.9 Hyperlipidemia, unspecified hyperlipidemia type     ICD-10-CM: E78.5 ICD-9-CM: 272.4 Overweight (BMI 25.0-29. 9)     ICD-10-CM: U05.3 ICD-9-CM: 278.02 Onychomycosis of toenail     ICD-10-CM: B35.1 ICD-9-CM: 110.1 Type 2 diabetes mellitus with hyperglycemia, without long-term current use of insulin (HCC)     ICD-10-CM: E11.65 ICD-9-CM: 250.00, 790.29 Advanced directives, counseling/discussion     ICD-10-CM: Z71.89 ICD-9-CM: V65.49 Vitals BP Pulse Temp Resp Height(growth percentile) Weight(growth percentile) 120/70 (!) 56 97.5 °F (36.4 °C) (Oral) 18 5' 1\" (1.549 m) 145 lb 4.8 oz (65.9 kg) SpO2 BMI OB Status Smoking Status 98% 27.45 kg/m2 Hysterectomy Never Smoker Vitals History BMI and BSA Data Body Mass Index Body Surface Area  
 27.45 kg/m 2 1.68 m 2 Preferred Pharmacy Pharmacy Name Phone [facility] [address] [facility], [address] [facility] [phone] Your Updated Medication List  
  
   
This list is accurate as of 10/4/18  9:53 AM.  Always use your most recent med list.  
  
  
  
  
 [address] Generic drug:  Blood-Glucose Meter  
accu check carlo plus meter DX: E11.65  
  
 aspirin 81 mg chewable tablet Commonly known as:  ASPIRIN LOW-STRENGTH  
CHEW AND SWALLOW ONE TABLET BY MOUTH DAILY  
  
 atorvastatin 10 mg tablet Commonly known as:  LIPITOR Take 1 Tab by mouth daily. ergocalciferol 50,000 unit capsule Commonly known as:  ERGOCALCIFEROL Take 1 Cap by mouth every seven (7) days. FREESTYLE LANCETS 28 gauge Misc Generic drug:  lancets DAILY USE FREESTYLE TEST strip Generic drug:  glucose blood VI test strips PATIENT TESTING BLOOD SUGAR ONCE DAILY  
  
 metoprolol tartrate 25 mg tablet Commonly known as:  LOPRESSOR Take 25 mg by mouth two (2) times a day. TYLENOL 325 mg tablet Generic drug:  acetaminophen Take  by mouth every four (4) hours as needed for Pain. We Performed the Following REFERRAL TO PODIATRY [REF90 Custom] Follow-up Instructions Return in about 3 months (around 1/4/2019) for follow up. Referral Information Referral ID Referred By Referred To  
  
 [id number] Dinah Guevara Not Available Visits Status Start Date End Date 1 New Request 10/4/18 10/4/19 If your referral has a status of pending review or denied, additional information will be sent to support the outcome of this decision. Patient Instructions Medicare Wellness Visit, Female The best way to live healthy is to have a lifestyle where you eat a well-balanced diet, exercise regularly, limit alcohol use, and quit all forms of tobacco/nicotine, if applicable. Regular preventive services are another way to keep healthy. Preventive services (vaccines, screening tests, monitoring & exams) can help personalize your care plan, which helps you manage your own care. Screening tests can find health problems at the earliest stages, when they are easiest to treat. [facility] follows the current, evidence-based guidelines published by the [place] (USPSTF) when recommending preventive services for our patients. Because we follow these guidelines, sometimes recommendations change over time as research supports it. (For example, mammograms used to be recommended annually. Even though Medicare will still pay for an annual mammogram, the newer guidelines recommend a mammogram every two years for women of average risk.) Of course, you and your doctor may decide to screen more often for some diseases, based on your risk and your health status. Preventive services for you include: - Medicare offers their members a free annual wellness visit, which is time for you and your primary care provider to discuss and plan for your preventive service needs. Take advantage of this benefit every year! 
-All adults over the age of 72 should receive the recommended pneumonia vaccines. Current USPSTF guidelines recommend a series of two vaccines for the best pneumonia protection.  
-All adults should have a flu vaccine yearly and a tetanus vaccine every 10 years. All adults age 61 and older should receive a shingles vaccine once in their lifetime.   
-A bone mass density test is recommended when a woman turns 65 to screen for osteoporosis. This test is only recommended one time, as a screening. Some providers will use this same test as a disease monitoring tool if you already have osteoporosis. -All adults age 38-68 who are overweight should have a diabetes screening test once every three years.  
-Other screening tests and preventive services for persons with diabetes include: an eye exam to screen for diabetic retinopathy, a kidney function test, a foot exam, and stricter control over your cholesterol.  
-Cardiovascular screening for adults with routine risk involves an electrocardiogram (ECG) at intervals determined by your doctor.  
-Colorectal cancer screenings should be done for adults age 54-65 with no increased risk factors for colorectal cancer. There are a number of acceptable methods of screening for this type of cancer. Each test has its own benefits and drawbacks. Discuss with your doctor what is most appropriate for you during your annual wellness visit. The different tests include: colonoscopy (considered the best screening method), a fecal occult blood test, a fecal DNA test, and sigmoidoscopy.  
-Breast cancer screenings are recommended every other year for women of normal risk, age 54-69. 
-Cervical cancer screenings for women over age 72 are only recommended with certain risk factors.  
-All adults born between [birth year] and 1965 should be screened once for Hepatitis C. Here is a list of your current Health Maintenance items (your personalized list of preventive services) with a due date: 
Health Maintenance Due Topic Date Due  Shingles Vaccine (1 of 2) 12/30/1979  
 DTaP/Tdap/Td  (1 - Tdap) 01/12/2013 Introducing [facility]! Jevon Aida introduces [employer] patient portal. Now you can access parts of your medical record, email your doctor's office, and request medication refills online. 1. In your internet browser, go to [email or web address] 2. Click on the First Time User? Click Here link in the Sign In box. You will see the New Member Sign Up page. 3. Enter your [employer] Access Code exactly as it appears below. You will not need to use this code after youve completed the sign-up process. If you do not sign up before the expiration date, you must request a new code. · [employer] Access Code: [email or web address] Expires: 12/24/2018  1:42 PM 
 
4. Enter the last four digits of your Social Security Number (xxxx) and Date of Birth (mm/dd/yyyy) as indicated and click Submit. You will be taken to the next sign-up page. 5. Create a [employer] ID. This will be your [employer] login ID and cannot be changed, so think of one that is secure and easy to remember. 6. Create a [employer] password. You can change your password at any time. 7. Enter your Password Reset Question and Answer. This can be used at a later time if you forget your password. 8. Enter your e-mail address. You will receive e-mail notification when new information is available in [address] 9. Click Sign Up. You can now view and download portions of your medical record. 10. Click the Download Summary menu link to download a portable copy of your medical information. If you have questions, please visit the Frequently Asked Questions section of the [employer] website. Remember, [employer] is NOT to be used for urgent needs. For medical emergencies, dial 911. Now available from your iPhone and Android! Please provide this summary of care documentation to your next provider. Your primary care clinician is listed as Lord Burroughs. If you have any questions after today's visit, please call [phone].

## 2018-10-04 NOTE — PROGRESS NOTES
Chief Complaint Patient presents with  Follow Up Chronic Condition Patient not fasting [facility] Annual Wellness Visit Pt is a 80y.o. year old female who presents for follow up of her chronic medical problems Hospitalized recently for palpitations, CP-found to have paroxysmal SVT-sent home on beta blocker, Losartan discontinued Nov 2 follow up with Cardio BP Readings from Last 3 Encounters:  
10/04/18 120/70  
09/25/18 (!) 177/95  
07/10/18 120/73 Seeing Dr Aurelio Sanders tomorrow; slipped back in June CT LOWER EXT RT W/O [id number]/2/2018 [employer] Result Impression Bridging healing bone at the medial malleolar fracture centrally, with more displacement and offset of the distal tibial articular surface as described. Saw ENT for right sided bony protrusion-jaw bone deteriorating, awaiting appt with Oral surgeon [place] for a few months now Patient has good social support-declined home health Podiatrist retired-needs a new one; has DM; thickened nails/bony abnormalities Last Point of Care HGB A1C Hemoglobin A1c (POC) Date Value Ref Range Status 05/15/2018 5.6 % Final  
 Denies polyuria, polydipsia and polyphagia Not on DM meds at this time Lab Results Component Value Date/Time VITAMIN D, 25-HYDROXY 52.6 02/26/2018 12:00 AM  
  On  rx Wt Readings from Last 3 Encounters:  
10/04/18 145 lb 4.8 oz (65.9 kg) 09/25/18 144 lb (65.3 kg) 05/15/18 139 lb (63 kg) BMI 27 ROS: 
 
Pt denies: Wt loss, Fever/Chills, HA, Visual changes, Fatigue, Chest pain, SOB, SALOMON, Abd pain, N/V/D/C, Blood in stool or urine, Edema. Pertinent positive as above in HPI. All others were negative Patient Active Problem List  
Diagnosis Code  Essential hypertension I10  
 Osteoporosis without current pathological fracture M81.0  Hyperlipidemia E78.5  Primary osteoarthritis of both knees M17.0  Advanced directives, counseling/discussion Z71.89  
  Overweight (BMI 25.0-29. 9) E66.3  Type 2 diabetes mellitus with hyperglycemia, without long-term current use of insulin ([facility]) E11.65  
 CKD (chronic kidney disease) stage 3, GFR 30-59 ml/min ([facility]) N18.3  Type 2 diabetes with nephropathy ([facility]) E11.21  
 Paroxysmal tachycardia ([facility]) I47.9 Past Medical History:  
Diagnosis Date  Diabetes ([facility]  Essential hypertension 5/25/2017  Hyperlipidemia 5/25/2017  Paroxysmal tachycardia ([facility] 9/18/2018 Current Outpatient Prescriptions Medication Sig Dispense Refill  Blood-Glucose Meter (ACCU-CHEK SALLIE PLUS METER) misc accu check sallie plus meter DX: E11.65    
 metoprolol tartrate (LOPRESSOR) 25 mg tablet Take 25 mg by mouth two (2) times a day.  ergocalciferol (ERGOCALCIFEROL) 50,000 unit capsule Take 1 Cap by mouth every seven (7) days. 12 Cap 1  
 atorvastatin (LIPITOR) 10 mg tablet Take 1 Tab by mouth daily. 90 Tab 3  
 acetaminophen (TYLENOL) 325 mg tablet Take  by mouth every four (4) hours as needed for Pain.  FREESTYLE TEST strip PATIENT TESTING BLOOD SUGAR ONCE DAILY 1 Package 10  
 FREESTYLE LANCETS 28 gauge misc DAILY USE 1 Package 10  
 aspirin (ASPIRIN LOW-STRENGTH) 81 mg chewable tablet CHEW AND SWALLOW ONE TABLET BY MOUTH DAILY 90 Tab 1 History Smoking Status  Never Smoker Smokeless Tobacco  
 Never Used No Known Allergies Patient Labs were reviewed: yes Patient Past Records were reviewed:  yes Objective:  
 
Vitals:  
 10/04/18 9705 10/04/18 0950 BP: 141/78 120/70 Pulse: (!) 56 Resp: 18 Temp: 97.5 °F (36.4 °C) TempSrc: Oral   
SpO2: 98% Weight: 145 lb 4.8 oz (65.9 kg) Height: 5' 1\" (1.549 m) Body mass index is 27.45 kg/(m^2). Exam:  
Appearance: alert, well appearing,  oriented to person, place, and time, acyanotic, in no respiratory distress and well hydrated. HEENT:  NC/AT, pink conj, anicteric sclerae Neck:  No cervical lymphadenopathy, no JVD, no thyromegaly, no carotid bruit Heart:  RRR without M/R/G Lungs:  CTAB, no rhonchi, rales, or wheezes with good air exchange Abdomen:  Non-tender, pos bowel sounds, no hepatosplenomegaly Ext:  No C/C/E, has a fracture boot on the right lower leg; walks with a cane Skin: no rash Neuro: no lateralizing signs, CNs II-XII intact Assessment/ Plan:  
Diagnoses and all orders for this visit: 
 
1. Medicare annual wellness visit, subsequent-see note below 2. Paroxysmal tachycardia (HCC)-SVT-continue with beta blocker and keep appt with Cardio 3. Essential hypertension-controlled, continue with present meds 4. Hyperlipidemia, unspecified hyperlipidemia type 5. Overweight (BMI 25.0-29.9) 6. Onychomycosis of toenail 
-     REFERRAL TO PODIATRY 7. Type 2 diabetes mellitus with hyperglycemia, without long-term current use of insulin (HCC) 
-     REFERRAL TO PODIATRY 8. Right ankle fracture-has follow up with Ortho in AM 
 
9. Right jaw pain-awaiting appt with oral surgeon 10. Vit d wek-ujuhjgvl-voetart to switch to OTC Ca+D daily Follow-up Disposition: 
Return in about 3 months (around 1/4/2019) for follow up. I have discussed the diagnosis with the patient and the intended plan as seen in the above orders. The patient has received an After-Visit Summary and questions were answered concerning future plans. Medication Side Effects and Warnings were discussed with patient: yes Patient verbalized understanding of above instructions. Sole Delatorre MD 
Internal Medicine [facility] This is the Subsequent Medicare Annual Wellness Exam, performed 12 months or more after the Initial AWV or the last Subsequent AWV I have reviewed the patient's medical history in detail and updated the computerized patient record. History Past Medical History:  
Diagnosis Date  Diabetes ([address]  Essential hypertension 5/25/2017  Hyperlipidemia 5/25/2017  Paroxysmal tachycardia ([address] 9/18/2018 Past Surgical History:  
Procedure Laterality Date  HX HYSTERECTOMY Current Outpatient Prescriptions Medication Sig Dispense Refill  Blood-Glucose Meter (ACCU-CHEK SALLIE PLUS METER) misc accu check sallie plus meter DX: E11.65    
 metoprolol tartrate (LOPRESSOR) 25 mg tablet Take 25 mg by mouth two (2) times a day.  ergocalciferol (ERGOCALCIFEROL) 50,000 unit capsule Take 1 Cap by mouth every seven (7) days. 12 Cap 1  
 atorvastatin (LIPITOR) 10 mg tablet Take 1 Tab by mouth daily. 90 Tab 3  
 acetaminophen (TYLENOL) 325 mg tablet Take  by mouth every four (4) hours as needed for Pain.  FREESTYLE TEST strip PATIENT TESTING BLOOD SUGAR ONCE DAILY 1 Package 10  
 FREESTYLE LANCETS 28 gauge misc DAILY USE 1 Package 10  
 aspirin (ASPIRIN LOW-STRENGTH) 81 mg chewable tablet CHEW AND SWALLOW ONE TABLET BY MOUTH DAILY 90 Tab 1 No Known Allergies Family History Problem Relation Age of Onset  Kidney Disease Mother  Hypertension Sister  Kidney Disease Brother Social History Substance Use Topics  Smoking status: Never Smoker  Smokeless tobacco: Never Used  Alcohol use No  
 
Patient Active Problem List  
Diagnosis Code  Essential hypertension I10  
 Osteoporosis without current pathological fracture M81.0  Hyperlipidemia E78.5  Primary osteoarthritis of both knees M17.0  Advanced directives, counseling/discussion Z71.89  
 Overweight (BMI 25.0-29. 9) E66.3  Type 2 diabetes mellitus with hyperglycemia, without long-term current use of insulin (HCC) E11.65  
 CKD (chronic kidney disease) stage 3, GFR 30-59 ml/min (HCC) N18.3  Type 2 diabetes with nephropathy (HCC) E11.21  
 Paroxysmal tachycardia (HCC) I47.9 Depression Risk Factor Screening: PHQ over the last two weeks 9/25/2018 Little interest or pleasure in doing things Not at all Feeling down, depressed, irritable, or hopeless Not at all Total Score PHQ 2 0 Alcohol Risk Factor Screening: You do not drink alcohol or very rarely. Functional Ability and Level of Safety:  
Hearing Loss Hearing is good. Activities of Daily Living The home contains: no safety equipment. Patient does total self care Fall Risk Fall Risk Assessment, last 12 mths 9/25/2018 Able to walk? Yes Fall in past 12 months? Yes Fall with injury? Yes  
Number of falls in past 12 months 1 Fall Risk Score 2 Abuse Screen Patient is not abused Cognitive Screening Evaluation of Cognitive Function: 
Has your family/caregiver stated any concerns about your memory: no 
Normal 
 
Patient Care Team  
Patient Care Team: Papito Duffy MD as PCP - General (Internal Medicine) Adalberto Graves MD (Ophthalmology) Assessment/Plan Education and counseling provided: 
Are appropriate based on today's review and evaluation End-of-Life planning (with patient's consent)-discussed with her and daughter today Pneumococcal Vaccine-done Influenza Vaccine-done Cardiovascular screening blood test 
Lab Results Component Value Date/Time Cholesterol, total 136 02/26/2018 12:00 AM  
 HDL Cholesterol 53 02/26/2018 12:00 AM  
 LDL, calculated 67 02/26/2018 12:00 AM  
 VLDL, calculated 16 02/26/2018 12:00 AM  
 Triglyceride 78 02/26/2018 12:00 AM  
 CHOL/HDL Ratio 3.8 09/23/2010 01:08 PM  
 
Bone mass measurement (DEXA)-done Screening for glaucoma-wears eyeglasses, eye exam up to date Diagnoses and all orders for this visit: 
 
1. Medicare annual wellness visit, subsequent-Refer to above for plan and to patient instructions for recommendations on HM 2. Advanced directives, counseling/discussion-given form to fill out and bring next visit Advised to get the vaccines below at her pharmacy Health Maintenance Due Topic Date Due  
  Shingrix Vaccine Age 50> (1 of 2) 12/30/1979  
 DTaP/Tdap/Td series (1 - Tdap) 01/12/2013 RTC yearly for wellness visit

## 2019-01-04 DIAGNOSIS — E78.5 HYPERLIPIDEMIA, UNSPECIFIED HYPERLIPIDEMIA TYPE: ICD-10-CM

## 2019-01-04 RX ORDER — ATORVASTATIN CALCIUM 10 MG/1
10 TABLET, FILM COATED ORAL DAILY
Qty: 90 TAB | Refills: 3 | Status: CANCELLED | OUTPATIENT
Start: 2019-01-04

## 2019-01-07 ENCOUNTER — OFFICE VISIT (OUTPATIENT)
Dept: FAMILY MEDICINE CLINIC | Age: 84
End: 2019-01-07

## 2019-01-07 VITALS
SYSTOLIC BLOOD PRESSURE: 140 MMHG | TEMPERATURE: 95.9 F | WEIGHT: 147 LBS | HEART RATE: 59 BPM | HEIGHT: 61 IN | OXYGEN SATURATION: 98 % | BODY MASS INDEX: 27.75 KG/M2 | RESPIRATION RATE: 17 BRPM | DIASTOLIC BLOOD PRESSURE: 80 MMHG

## 2019-01-07 DIAGNOSIS — I10 ESSENTIAL HYPERTENSION: ICD-10-CM

## 2019-01-07 DIAGNOSIS — E78.5 HYPERLIPIDEMIA, UNSPECIFIED HYPERLIPIDEMIA TYPE: ICD-10-CM

## 2019-01-07 DIAGNOSIS — E66.3 OVERWEIGHT (BMI 25.0-29.9): ICD-10-CM

## 2019-01-07 DIAGNOSIS — K11.8 MASS OF PAROTID GLAND: ICD-10-CM

## 2019-01-07 DIAGNOSIS — R68.84 PAIN IN UPPER JAW: ICD-10-CM

## 2019-01-07 DIAGNOSIS — E11.65 TYPE 2 DIABETES MELLITUS WITH HYPERGLYCEMIA, WITHOUT LONG-TERM CURRENT USE OF INSULIN (HCC): Primary | ICD-10-CM

## 2019-01-07 DIAGNOSIS — I47.9 PAROXYSMAL TACHYCARDIA (HCC): ICD-10-CM

## 2019-01-07 LAB — HBA1C MFR BLD HPLC: 6.1 %

## 2019-01-07 RX ORDER — METOPROLOL TARTRATE 25 MG/1
25 TABLET, FILM COATED ORAL 2 TIMES DAILY
Qty: 180 TAB | Refills: 2 | Status: SHIPPED | OUTPATIENT
Start: 2019-01-07 | End: 2019-07-17 | Stop reason: SDUPTHER

## 2019-01-07 RX ORDER — ATORVASTATIN CALCIUM 10 MG/1
10 TABLET, FILM COATED ORAL DAILY
Qty: 90 TAB | Refills: 2 | Status: SHIPPED | OUTPATIENT
Start: 2019-01-07 | End: 2019-10-04 | Stop reason: SDUPTHER

## 2019-01-07 NOTE — PROGRESS NOTES
Chief Complaint   Patient presents with    Follow Up Chronic Condition     3 month; patient is fasting    Medication Refill     Would like 90 day supply       1. Have you been to the ER, urgent care clinic since your last visit? Hospitalized since your last visit? No    2. Have you seen or consulted any other health care providers outside of the [address] [place] [address] since your last visit? Include any pap smears or colon screening.  No

## 2019-01-07 NOTE — PATIENT INSTRUCTIONS
High Blood Pressure: Care Instructions  Your Care Instructions    If your blood pressure is usually above 130/80, you have high blood pressure, or hypertension. That means the top number is 130 or higher or the bottom number is 80 or higher, or both. Despite what a lot of people think, high blood pressure usually doesn't cause headaches or make you feel dizzy or lightheaded. It usually has no symptoms. But it does increase your risk for heart attack, stroke, and kidney or eye damage. The higher your blood pressure, the more your risk increases. Your doctor will give you a goal for your blood pressure. Your goal will be based on your health and your age. Lifestyle changes, such as eating healthy and being active, are always important to help lower blood pressure. You might also take medicine to reach your blood pressure goal.  Follow-up care is a key part of your treatment and safety. Be sure to make and go to all appointments, and call your doctor if you are having problems. It's also a good idea to know your test results and keep a list of the medicines you take. How can you care for yourself at home? Medical treatment  · If you stop taking your medicine, your blood pressure will go back up. You may take one or more types of medicine to lower your blood pressure. Be safe with medicines. Take your medicine exactly as prescribed. Call your doctor if you think you are having a problem with your medicine. · Talk to your doctor before you start taking aspirin every day. Aspirin can help certain people lower their risk of a heart attack or stroke. But taking aspirin isn't right for everyone, because it can cause serious bleeding. · See your doctor regularly. You may need to see the doctor more often at first or until your blood pressure comes down. · If you are taking blood pressure medicine, talk to your doctor before you take decongestants or anti-inflammatory medicine, such as ibuprofen.  Some of these medicines can raise blood pressure. · Learn how to check your blood pressure at home. Lifestyle changes  · Stay at a healthy weight. This is especially important if you put on weight around the waist. Losing even 10 pounds can help you lower your blood pressure. · If your doctor recommends it, get more exercise. Walking is a good choice. Bit by bit, increase the amount you walk every day. Try for at least 30 minutes on most days of the week. You also may want to swim, bike, or do other activities. · Avoid or limit alcohol. Talk to your doctor about whether you can drink any alcohol. · Try to limit how much sodium you eat to less than 2,300 milligrams (mg) a day. Your doctor may ask you to try to eat less than 1,500 mg a day. · Eat plenty of fruits (such as bananas and oranges), vegetables, legumes, whole grains, and low-fat dairy products. · Lower the amount of saturated fat in your diet. Saturated fat is found in animal products such as milk, cheese, and meat. Limiting these foods may help you lose weight and also lower your risk for heart disease. · Do not smoke. Smoking increases your risk for heart attack and stroke. If you need help quitting, talk to your doctor about stop-smoking programs and medicines. These can increase your chances of quitting for good. When should you call for help? Call 911 anytime you think you may need emergency care. This may mean having symptoms that suggest that your blood pressure is causing a serious heart or blood vessel problem. Your blood pressure may be over 180/120.   For example, call 911 if:    · You have symptoms of a heart attack. These may include:  ? Chest pain or pressure, or a strange feeling in the chest.  ? Sweating. ? Shortness of breath. ? Nausea or vomiting. ? Pain, pressure, or a strange feeling in the back, neck, jaw, or upper belly or in one or both shoulders or arms. ? Lightheadedness or sudden weakness.   ? A fast or irregular heartbeat.     · You have symptoms of a stroke. These may include:  ? Sudden numbness, tingling, weakness, or loss of movement in your face, arm, or leg, especially on only one side of your body. ? Sudden vision changes. ? Sudden trouble speaking. ? Sudden confusion or trouble understanding simple statements. ? Sudden problems with walking or balance. ? A sudden, severe headache that is different from past headaches.     · You have severe back or belly pain.    Do not wait until your blood pressure comes down on its own. Get help right away.   Call your doctor now or seek immediate care if:    · Your blood pressure is much higher than normal (such as 180/120 or higher), but you don't have symptoms.     · You think high blood pressure is causing symptoms, such as:  ? Severe headache.  ? Blurry vision.    Watch closely for changes in your health, and be sure to contact your doctor if:    · Your blood pressure measures higher than your doctor recommends at least 2 times. That means the top number is higher or the bottom number is higher, or both.     · You think you may be having side effects from your blood pressure medicine. Where can you learn more? Go to http://kosta-angle.info/. Enter E337 in the search box to learn more about \"High Blood Pressure: Care Instructions. \"  Current as of: December 6, 2017  Content Version: 11.8  © 0497-9657 [employer]. Care instructions adapted under license by [employer] (which disclaims liability or warranty for this information). If you have questions about a medical condition or this instruction, always ask your healthcare professional. [place] [address] any warranty or liability for your use of this information.

## 2019-01-07 NOTE — PROGRESS NOTES
Chief Complaint   Patient presents with    Follow Up Chronic Condition     3 month; patient is fasting    Medication Refill     Would like 80 day supply       Pt is a 80y.o. year old female who presents for follow up of her chronic medical problems    Health Maintenance Due   Topic Date Due    Shingrix Vaccine Age 49> (1 of 2) 12/30/1979    DTaP/Tdap/Td series (1 - Tdap) 01/12/2013       BP Readings from Last 3 Encounters:   01/07/19 (!) 147/92   10/04/18 120/70   09/25/18 (!) 177/95   Repeat BP still slightly elevated  Takes Bp med twice a day; has not taken it today bec she is fasting    Lives with her grand daughter    Lab Results   Component Value Date/Time    Cholesterol, total 162 01/07/2019 10:26 AM    HDL Cholesterol 52 01/07/2019 10:26 AM    LDL, calculated 91 01/07/2019 10:26 AM    VLDL, calculated 19 01/07/2019 10:26 AM    Triglyceride 97 01/07/2019 10:26 AM    CHOL/HDL Ratio 3.8 09/23/2010 01:08 PM   On Lipitor    Lab Results   Component Value Date/Time    Hemoglobin A1c 6.1 (H) 05/25/2017 10:47 AM    Hemoglobin A1c (POC) 6.1 01/07/2019 09:52 AM   Denies polyuria, polydipsia and polyphagia  Off diabetes meds    Lab Results   Component Value Date/Time    VITAMIN D, 25-HYDROXY 52.6 02/26/2018 12:00 AM     S/p rx    Saw ENT for jaw problem-referred to oral surgeon; jaw bone deteriorating-decided not to da anything/will try to chew less  Was on Boniva for a while/stopped this since she started seeing me  Pain comes and goes-puts warm compress on it  ED CT HEAD NO CONTRAST9/18/2018  [facility]  Result Impression   :    1. No CT findings of acute infarct. No acute hemorrhage or intracranial mass. 2. Similar appearance of minimal chronic small vessel ischemic changes and cerebral volume loss. 3. 3 cm soft tissue mass in the deep lobe of the right parotid gland.  Slightly increased in size when compared to 3/29/2016 brain MRI, incompletely imaged on this exam. Seen in greater detail on the CTA of the head and neck dictated separately. Statistically most likely benign lesions such as pleomorphic adenoma, but given increased in size over time low-grade malignant tumor cannot be excluded. MRI HEAD without CONTRAST3/29/2016  [facility]  Result Impression   Impression:  1.  No acute hemorrhage or acute infarction. 2.  Very mild cortical atrophy and ischemic white matter change. 3.  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          ROS:    Pt denies: Wt loss, Fever/Chills, HA, Visual changes, Fatigue, Chest pain, SOB, SALOMON, Abd pain, N/V/D/C, Blood in stool or urine, Edema. Pertinent positive as above in HPI. All others were negative    Patient Active Problem List   Diagnosis Code    Essential hypertension I10    Osteoporosis without current pathological fracture M81.0    Hyperlipidemia E78.5    Primary osteoarthritis of both knees M17.0    Advanced directives, counseling/discussion Z71.89    Overweight (BMI 25.0-29. 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 90 Tab 2    metoprolol tartrate (LOPRESSOR) 25 mg tablet Take 1 Tab by mouth two (2) times a day. 180 Tab 2    Blood-Glucose Meter (ACCU-CHEK SALLIE PLUS METER) misc accu check sallie plus meter DX: E11.65      acetaminophen (TYLENOL) 325 mg tablet Take  by mouth every four (4) hours as needed for Pain.       FREESTYLE TEST strip PATIENT TESTING BLOOD SUGAR ONCE DAILY 1 Package 10    FREESTYLE LANCETS 28 gauge misc DAILY USE 1 Package 10    aspirin (ASPIRIN LOW-STRENGTH) 81 mg chewable tablet CHEW AND SWALLOW ONE TABLET BY MOUTH DAILY 90 Tab 1       Social History     Tobacco Use   Smoking Status Never Smoker   Smokeless Tobacco Never Used       No Known Allergies    Patient Labs were reviewed: yes      Patient Past Records were reviewed:  yes        Objective:     Vitals:    01/07/19 0937 01/07/19 1026   BP: (!) 147/92 140/80   Pulse: (!) 59    Resp: 17    Temp: 95.9 °F (35.5 °C)    TempSrc: Oral    SpO2: 98%    Weight: 147 lb (66.7 kg)    Height: 5' 1\" (1.549 m)      Body mass index is 27.78 kg/m². Exam:   Appearance: alert, well appearing,  oriented to person, place, and time, acyanotic, in no respiratory distress and well hydrated. HEENT:  NC/AT, pink conj, anicteric sclerae, mild prominence on the right jaw area  Neck:  No cervical lymphadenopathy, no JVD, no thyromegaly, no carotid bruit  Heart:  RRR without M/R/G  Lungs:  CTAB, no rhonchi, rales, or wheezes with good air exchange   Abdomen:  Non-tender, pos bowel sounds, no hepatosplenomegaly  Ext:  No C/C/E    Skin: no rash  Neuro: no lateralizing signs, CNs II-XII intact    Last Point of Care HGB A1C  Hemoglobin A1c (POC)   Date Value Ref Range Status   01/07/2019 6.1 % Final      Assessment/ Plan:   Diagnoses and all orders for this visit:    1. Type 2 diabetes mellitus with hyperglycemia, without long-term current use of insulin (HCC)-controlled without any meds  -     AMB POC HEMOGLOBIN A1C    2. Hyperlipidemia, unspecified hyperlipidemia type-at goal on Lipitor  -     atorvastatin (LIPITOR) 10 mg tablet; Take 1 Tab by mouth daily. 3. Essential hypertension-advised to take BP med prior to next visit  -     metoprolol tartrate (LOPRESSOR) 25 mg tablet; Take 1 Tab by mouth two (2) times a day. -     CBC WITH AUTOMATED DIFF;  Future  -     METABOLIC PANEL, COMPREHENSIVE; Future  -     LIPID PANEL; Future    4. Pain in upper jaw-intermittent and likely from the parotid mass/? Arthritis of the jaw -per daughter and patient, she does not want anything done    5. Paroxysmal tachycardia (HCC)-doing well on Metoprolol; Cardio saw her for follow up on 11/2018/note reviewed today    6. Mass of parotid gland-as above    7. Overweight (BMI 25.0-29. 9)-discussed limiting chyna to [id number]/day as she is not able to exercise      Follow-up Disposition:  Return in about 3 months (around 4/7/2019) for follow up. I have discussed the diagnosis with the patient and the intended plan as seen in the above orders. The patient has received an After-Visit Summary and questions were answered concerning future plans. Medication Side Effects and Warnings were discussed with patient: yes    Patient verbalized understanding of above instructions.     Patrick Bagley MD  Internal Medicine  [facility]

## 2019-01-08 PROBLEM — K11.8 MASS OF PAROTID GLAND: Status: ACTIVE | Noted: 2019-01-08

## 2019-01-08 LAB
ALBUMIN SERPL-MCNC: 4.4 G/DL (ref 3.5–4.7)
ALBUMIN/GLOB SERPL: 1.4 {RATIO} (ref 1.2–2.2)
ALP SERPL-CCNC: 111 IU/L (ref 39–117)
ALT SERPL-CCNC: 15 IU/L (ref 0–32)
AST SERPL-CCNC: 19 IU/L (ref 0–40)
BASOPHILS # BLD AUTO: 0 X10E3/UL (ref 0–0.2)
BASOPHILS NFR BLD AUTO: 1 %
BILIRUB SERPL-MCNC: 0.4 MG/DL (ref 0–1.2)
BUN SERPL-MCNC: 13 MG/DL (ref 8–27)
BUN/CREAT SERPL: 12 (ref 12–28)
CALCIUM SERPL-MCNC: 9.4 MG/DL (ref 8.7–10.3)
CHLORIDE SERPL-SCNC: 106 MMOL/L (ref 96–106)
CHOLEST SERPL-MCNC: 162 MG/DL (ref 100–199)
CO2 SERPL-SCNC: 19 MMOL/L (ref 20–29)
CREAT SERPL-MCNC: 1.08 MG/DL (ref 0.57–1)
EOSINOPHIL # BLD AUTO: 0.1 X10E3/UL (ref 0–0.4)
EOSINOPHIL NFR BLD AUTO: 2 %
ERYTHROCYTE [DISTWIDTH] IN BLOOD BY AUTOMATED COUNT: 16.7 % (ref 12.3–15.4)
GLOBULIN SER CALC-MCNC: 3.1 G/DL (ref 1.5–4.5)
GLUCOSE SERPL-MCNC: 96 MG/DL (ref 65–99)
HCT VFR BLD AUTO: 38.9 % (ref 34–46.6)
HDLC SERPL-MCNC: 52 MG/DL
HGB BLD-MCNC: 13.1 G/DL (ref 11.1–15.9)
IMM GRANULOCYTES # BLD AUTO: 0 X10E3/UL (ref 0–0.1)
IMM GRANULOCYTES NFR BLD AUTO: 0 %
INTERPRETATION, 910389: NORMAL
INTERPRETATION: NORMAL
LDLC SERPL CALC-MCNC: 91 MG/DL (ref 0–99)
LYMPHOCYTES # BLD AUTO: 1.5 X10E3/UL (ref 0.7–3.1)
LYMPHOCYTES NFR BLD AUTO: 36 %
MCH RBC QN AUTO: 26.1 PG (ref 26.6–33)
MCHC RBC AUTO-ENTMCNC: 33.7 G/DL (ref 31.5–35.7)
MCV RBC AUTO: 78 FL (ref 79–97)
MONOCYTES # BLD AUTO: 0.3 X10E3/UL (ref 0.1–0.9)
MONOCYTES NFR BLD AUTO: 7 %
NEUTROPHILS # BLD AUTO: 2.3 X10E3/UL (ref 1.4–7)
NEUTROPHILS NFR BLD AUTO: 54 %
PDF IMAGE, 910387: NORMAL
PLATELET # BLD AUTO: 272 X10E3/UL (ref 150–379)
POTASSIUM SERPL-SCNC: 4.8 MMOL/L (ref 3.5–5.2)
PROT SERPL-MCNC: 7.5 G/DL (ref 6–8.5)
RBC # BLD AUTO: 5.02 X10E6/UL (ref 3.77–5.28)
SODIUM SERPL-SCNC: 146 MMOL/L (ref 134–144)
TRIGL SERPL-MCNC: 97 MG/DL (ref 0–149)
VLDLC SERPL CALC-MCNC: 19 MG/DL (ref 5–40)
WBC # BLD AUTO: 4.2 X10E3/UL (ref 3.4–10.8)

## 2019-01-21 NOTE — PROGRESS NOTES
pls let patient know kidney function is decreased and sodium is a bit high so she needs to increase fluid intake  The rest of the labs were ok

## 2019-04-08 ENCOUNTER — OFFICE VISIT (OUTPATIENT)
Dept: FAMILY MEDICINE CLINIC | Age: 84
End: 2019-04-08

## 2019-04-08 DIAGNOSIS — I10 ESSENTIAL HYPERTENSION: ICD-10-CM

## 2019-04-08 DIAGNOSIS — R68.84 CHRONIC JAW PAIN: ICD-10-CM

## 2019-04-08 DIAGNOSIS — G89.29 CHRONIC JAW PAIN: ICD-10-CM

## 2019-04-08 DIAGNOSIS — E11.65 TYPE 2 DIABETES MELLITUS WITH HYPERGLYCEMIA, WITHOUT LONG-TERM CURRENT USE OF INSULIN (HCC): Primary | ICD-10-CM

## 2019-04-08 DIAGNOSIS — E78.5 HYPERLIPIDEMIA, UNSPECIFIED HYPERLIPIDEMIA TYPE: ICD-10-CM

## 2019-04-08 DIAGNOSIS — I47.9 PAROXYSMAL TACHYCARDIA (HCC): ICD-10-CM

## 2019-04-08 DIAGNOSIS — N18.30 CKD (CHRONIC KIDNEY DISEASE) STAGE 3, GFR 30-59 ML/MIN (HCC): ICD-10-CM

## 2019-04-08 NOTE — PATIENT INSTRUCTIONS
Temporomandibular Disorder: Care Instructions Your Care Instructions Temporomandibular (TM) disorders are a problem with the muscles and joints that connect your jaw to your skull. They cause pain when you open your mouth, chew, or yawn. You may feel this pain on one or both sides. TM disorders are often caused by tight jaw muscles. The tightness can be caused by clenching or grinding your teeth. This may happen when you have a lot of stress in your life. If you lower your stress, you may be able to stop clenching or grinding your teeth. This will help relax your jaw and reduce your pain. You may also be able to do some things at home to feel better. But if none of this works, your doctor may prescribe medicine to help relax your muscles and control the pain. Follow-up care is a key part of your treatment and safety. Be sure to make and go to all appointments, and call your doctor if you are having problems. It's also a good idea to know your test results and keep a list of the medicines you take. How can you care for yourself at home? · Put a warm, moist cloth or heating pad set on low on your jaw. Do this for 10 to 20 minutes at a time. Put a thin cloth between the heating pad and your skin. · Avoid hard or chewy foods that cause your jaws to work very hard. Examples include popcorn, jerky, tough meats, chewy breads, gum, and raw apples and carrots. · Choose softer foods that are easy to chew. These include eggs, yogurt, and soup. · Cut your food into small pieces. Chew slowly. · If your jaw gets too painful to chew, or if it locks, you may need to puree your food for a few days or weeks. · To relax your jaw, repeat this exercise for a few minutes every morning and evening. Watch yourself in a mirror. Gently open and close your mouth. Move your jaw straight up and down. But don't do this if it makes your pain worse.  
· Get at least 30 minutes of exercise on most days of the week to relieve stress. Walking is a good choice. You also may want to do other activities, such as running, swimming, cycling, or playing tennis or team sports. · Do not: 
? Hold a phone between your shoulder and your jaw. ? Open your mouth all the way, like when you sing loudly or yawn. ? Clench or grind your teeth, bite your lips, or chew your fingernails. ? Clench things such as pens, pipes, or cigars between your teeth. When should you call for help? Call your doctor now or seek immediate medical care if: 
  · Your jaw is locked open or shut or it is hard to move your jaw.  
 Watch closely for changes in your health, and be sure to contact your doctor if: 
  · Your jaw pain gets worse.  
  · Your face is swollen.  
  · You do not get better as expected. Where can you learn more? Go to http://kosta-angle.info/. Enter E732 in the search box to learn more about \"Temporomandibular Disorder: Care Instructions. \" Current as of: March 27, 2018 Content Version: 11.9 © 7685-3694 [employer]. Care instructions adapted under license by [employer] (which disclaims liability or warranty for this information). If you have questions about a medical condition or this instruction, always ask your healthcare professional. [address] any warranty or liability for your use of this information.

## 2019-04-08 NOTE — PROGRESS NOTES
Chief Complaint Patient presents with  Follow Up Chronic Condition 3 month; patient not fasting; A1C check  Jaw Pain Pt is a 80y.o. year old female who presents for follow up of her chronic medical problems BMI 29 Wt Readings from Last 3 Encounters:  
04/08/19 154 lb 12.8 oz (70.2 kg) 01/07/19 147 lb (66.7 kg) 10/04/18 145 lb 4.8 oz (65.9 kg) appetite is real good says her daughter BP Readings from Last 3 Encounters:  
04/08/19 169/81  
01/07/19 140/80  
10/04/18 120/70 Repeat BP- better Last Point of Care HGB A1C Hemoglobin A1c (POC) Date Value Ref Range Status 01/07/2019 6.1 % Final  
 6% today Not on any meds Denies polyuria, polydipsia and polyphagia Lab Results Component Value Date/Time Cholesterol, total 162 01/07/2019 10:26 AM  
 HDL Cholesterol 52 01/07/2019 10:26 AM  
 LDL, calculated 91 01/07/2019 10:26 AM  
 VLDL, calculated 19 01/07/2019 10:26 AM  
 Triglyceride 97 01/07/2019 10:26 AM  
 CHOL/HDL Ratio 3.8 09/23/2010 01:08 PM  
On Lipitor-compliant Uses a cane sometimes for ambulation Saw Cardio in 11/2018-no need for follow up, continue with Metoprolol Bothered by intermittent pain on the right jaw Saw ENT and told she needs to see oral surgeon The one oral surgeon she went to was not part of her network so no Xrays were done and told it is deteriorating CT CTA NECK W/WO INC POST PROC9/18/2018 [facility] Result Impression Cervical carotid and vertebral arteries are patent without stenosis. 0% NASCET ICA narrowing bilaterally. Incidental inclusion of portions of the intracranial circulation shows no stenosis in distal internal carotid arteries. Patent vertebral arteries and basilar. 3 cm soft tissue mass in the deep lobe of the right parotid gland has increased in size over time. Benign lesions such as pleomorphic adenoma is statistically most likely, but low-grade malignant tumors can have a similar appearance. Enlarged left thyroid lobe, extends slightly below the level of the sternal notch. Mild mass effect on the left common carotid artery with no vascular compression. Health Maintenance Due Topic Date Due  Shingrix Vaccine Age 50> (1 of 2) 12/30/1979  
 DTaP/Tdap/Td series (1 - Tdap) 01/12/2013 ROS: 
 
Pt denies: Wt loss, Fever/Chills, HA, Visual changes, Fatigue, Chest pain, SOB, SALOMON, Abd pain, N/V/D/C, Blood in stool or urine, Edema. Pertinent positive as above in HPI. All others were negative Patient Active Problem List  
Diagnosis Code  Essential hypertension I10  
 Osteoporosis without current pathological fracture M81.0  Hyperlipidemia E78.5  Primary osteoarthritis of both knees M17.0  Advanced directives, counseling/discussion Z71.89  
 Overweight (BMI 25.0-29. 9) E66.3  Type 2 diabetes mellitus with hyperglycemia, without long-term current use of insulin ([facility]) E11.65  
 CKD (chronic kidney disease) stage 3, GFR 30-59 ml/min ([facility]) N18.3  Type 2 diabetes with nephropathy ([facility]) E11.21  
 Paroxysmal tachycardia (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  
Diagnosis Date  Diabetes ([facility]  Essential hypertension 5/25/2017  Hyperlipidemia 5/25/2017  Paroxysmal tachycardia ([facility] 9/18/2018 Current Outpatient Medications Medication Sig Dispense Refill  atorvastatin (LIPITOR) 10 mg tablet Take 1 Tab by mouth daily. 90 Tab 2  
 metoprolol tartrate (LOPRESSOR) 25 mg tablet Take 1 Tab by mouth two (2) times a day. 180 Tab 2  Blood-Glucose Meter (ACCU-CHEK SALLIE PLUS METER) misc accu check sallie plus meter DX: E11.65    
 acetaminophen (TYLENOL) 325 mg tablet Take  by mouth every four (4) hours as needed for Pain.     
 FREESTYLE TEST strip PATIENT TESTING BLOOD SUGAR ONCE DAILY 1 Package 10  
 FREESTYLE LANCETS 28 gauge [facility] DAILY USE 1 Package 10  
 aspirin (ASPIRIN LOW-STRENGTH) 81 mg chewable tablet CHEW AND SWALLOW ONE TABLET BY MOUTH DAILY 90 Tab 1 Social History Tobacco Use Smoking Status Never Smoker Smokeless Tobacco Never Used No Known Allergies Patient Labs were reviewed: yes Patient Past Records were reviewed:  yes Objective:  
 
Vitals:  
 04/08/19 1024 04/08/19 1045 BP: 169/81 140/82 Pulse: (!) 56 Resp: 18 Temp: 97 °F (36.1 °C) TempSrc: Oral   
SpO2: 98% Weight: 154 lb 12.8 oz (70.2 kg) Height: 5' 1\" (1.549 m) Body mass index is 29.25 kg/m². Exam:  
Appearance: alert, well appearing,  oriented to person, place, and time, acyanotic, in no respiratory distress and well hydrated. HEENT:  NC/AT, pink conj, anicteric sclerae, reproducible pain on the right jaw Neck:  No cervical lymphadenopathy, no JVD, no thyromegaly, no carotid bruit Heart:  RRR without M/R/G Lungs:  CTAB, no rhonchi, rales, or wheezes with good air exchange Abdomen:  Non-tender, pos bowel sounds, no hepatosplenomegaly Ext:  No C/C/E Skin: no rash Neuro: no lateralizing signs, CNs II-XII intact Last Point of Care HGB A1C Hemoglobin A1c (POC) Date Value Ref Range Status 01/07/2019 6.1 % Final  
  
Assessment/ Plan:  
Diagnoses and all orders for this visit: 
 
1. Type 2 diabetes mellitus with hyperglycemia, without long-term current use of insulin (HCC) -     AMB POC HEMOGLOBIN A1C 
 
2. Chronic jaw pain-etio? -     XR TMJ BI; Future 3. Essential hypertension-controlled, continue with present meds -     METABOLIC PANEL, COMPREHENSIVE; Future 4. Hyperlipidemia, unspecified hyperlipidemia type-at goal on lipitor 5. Paroxysmal tachycardia (HCC)-continue with Metoprolol 6. CKD (chronic kidney disease) stage 3, GFR 30-59 ml/min ([facility])-advised to continue to avoid OTC NSAIDs 
-     METABOLIC PANEL, COMPREHENSIVE; Future Follow-up and Dispositions · Return in about 3 months (around 7/8/2019) for follow up. I have discussed the diagnosis with the patient and the intended plan as seen in the above orders. The patient has received an After-Visit Summary and questions were answered concerning future plans. Medication Side Effects and Warnings were discussed with patient: yes Patient verbalized understanding of above instructions. Quinn Runner, MD 
Internal Medicine [facility]

## 2019-04-08 NOTE — PROGRESS NOTES
Chief Complaint Patient presents with  Follow Up Chronic Condition 3 month; patient not fasting; A1C check  Jaw Pain 1. Have you been to the ER, urgent care clinic since your last visit? Hospitalized since your last visit? No 
 
2. Have you seen or consulted any other health care providers outside of the [address] [place] [address] since your last visit? Include any pap smears or colon screening.  No

## 2019-04-09 ENCOUNTER — TELEPHONE (OUTPATIENT)
Dept: FAMILY MEDICINE CLINIC | Age: 84
End: 2019-04-09

## 2019-04-09 LAB
ALBUMIN SERPL-MCNC: 4.2 G/DL (ref 3.5–4.7)
ALBUMIN/GLOB SERPL: 1.8 {RATIO} (ref 1.2–2.2)
ALP SERPL-CCNC: 98 IU/L (ref 39–117)
ALT SERPL-CCNC: 15 IU/L (ref 0–32)
AST SERPL-CCNC: 19 IU/L (ref 0–40)
BILIRUB SERPL-MCNC: 0.3 MG/DL (ref 0–1.2)
BUN SERPL-MCNC: 12 MG/DL (ref 8–27)
BUN/CREAT SERPL: 14 (ref 12–28)
CALCIUM SERPL-MCNC: 9 MG/DL (ref 8.7–10.3)
CHLORIDE SERPL-SCNC: 107 MMOL/L (ref 96–106)
CO2 SERPL-SCNC: 22 MMOL/L (ref 20–29)
CREAT SERPL-MCNC: 0.85 MG/DL (ref 0.57–1)
GLOBULIN SER CALC-MCNC: 2.4 G/DL (ref 1.5–4.5)
GLUCOSE SERPL-MCNC: 82 MG/DL (ref 65–99)
POTASSIUM SERPL-SCNC: 4.2 MMOL/L (ref 3.5–5.2)
PROT SERPL-MCNC: 6.6 G/DL (ref 6–8.5)
SODIUM SERPL-SCNC: 145 MMOL/L (ref 134–144)

## 2019-04-13 VITALS
RESPIRATION RATE: 18 BRPM | HEIGHT: 61 IN | HEART RATE: 56 BPM | TEMPERATURE: 97 F | SYSTOLIC BLOOD PRESSURE: 140 MMHG | BODY MASS INDEX: 29.23 KG/M2 | WEIGHT: 154.8 LBS | OXYGEN SATURATION: 98 % | DIASTOLIC BLOOD PRESSURE: 82 MMHG

## 2019-04-24 ENCOUNTER — TELEPHONE (OUTPATIENT)
Dept: FAMILY MEDICINE CLINIC | Age: 84
End: 2019-04-24

## 2019-05-01 DIAGNOSIS — G89.29 CHRONIC JAW PAIN: ICD-10-CM

## 2019-05-01 DIAGNOSIS — R68.84 CHRONIC JAW PAIN: ICD-10-CM

## 2019-07-17 ENCOUNTER — OFFICE VISIT (OUTPATIENT)
Dept: FAMILY MEDICINE CLINIC | Age: 84
End: 2019-07-17

## 2019-07-17 VITALS
RESPIRATION RATE: 18 BRPM | HEIGHT: 61 IN | BODY MASS INDEX: 29.42 KG/M2 | SYSTOLIC BLOOD PRESSURE: 131 MMHG | HEART RATE: 56 BPM | DIASTOLIC BLOOD PRESSURE: 81 MMHG | TEMPERATURE: 97.1 F | WEIGHT: 155.8 LBS

## 2019-07-17 DIAGNOSIS — K11.8 MASS OF PAROTID GLAND: ICD-10-CM

## 2019-07-17 DIAGNOSIS — E11.65 TYPE 2 DIABETES MELLITUS WITH HYPERGLYCEMIA, WITHOUT LONG-TERM CURRENT USE OF INSULIN (HCC): Primary | ICD-10-CM

## 2019-07-17 DIAGNOSIS — E78.5 HYPERLIPIDEMIA, UNSPECIFIED HYPERLIPIDEMIA TYPE: ICD-10-CM

## 2019-07-17 DIAGNOSIS — I10 ESSENTIAL HYPERTENSION: ICD-10-CM

## 2019-07-17 LAB — HBA1C MFR BLD HPLC: 5.7 %

## 2019-07-17 RX ORDER — METOPROLOL TARTRATE 25 MG/1
25 TABLET, FILM COATED ORAL 2 TIMES DAILY
Qty: 180 TAB | Refills: 2 | Status: SHIPPED | OUTPATIENT
Start: 2019-07-17 | End: 2020-06-12 | Stop reason: SDUPTHER

## 2019-07-17 NOTE — PROGRESS NOTES
Chief Complaint   Patient presents with    Cholesterol Problem     f/u office visit       1. Have you been to the ER, urgent care clinic or hospitalized since your last visit? NO.     2. Have you seen or consulted any other health care providers outside of the [address] [place] [address] since your last visit (Include any pap smears or colon screening)? NO      Do you have an Advanced Directive? NO    Would you like information on Advanced Directives?  YES

## 2019-07-17 NOTE — PATIENT INSTRUCTIONS

## 2019-07-17 NOTE — PROGRESS NOTES
Chief Complaint   Patient presents with    Cholesterol Problem     f/u office visit       Pt is a 80y.o. year old female who presents for follow up of her chronic medical problems      Health Maintenance Due   Topic Date Due    Shingrix Vaccine Age 49> (1 of 2) 12/30/1979    DTaP/Tdap/Td series (1 - Tdap) 01/12/2013    MICROALBUMIN Q1  05/15/2019    HEMOGLOBIN A1C Q6M  07/07/2019     Last Point of Care HGB A1C  Hemoglobin A1c (POC)   Date Value Ref Range Status   07/17/2019 5.7 % Final    not on meds    BP Readings from Last 3 Encounters:   07/17/19 131/81   04/08/19 140/82   01/07/19 140/80   needs refill of Metorpolol    Lab Results   Component Value Date/Time    Cholesterol, total 162 01/07/2019 10:26 AM    HDL Cholesterol 52 01/07/2019 10:26 AM    LDL, calculated 91 01/07/2019 10:26 AM    VLDL, calculated 19 01/07/2019 10:26 AM    Triglyceride 97 01/07/2019 10:26 AM    CHOL/HDL Ratio 3.8 09/23/2010 01:08 PM   on Lipitor      CT CTA NECK W/WO INC POST [address]  Result Impression     Cervical carotid and vertebral arteries are patent without stenosis. 0% NASCET ICA narrowing bilaterally. Incidental inclusion of portions of the intracranial circulation shows no stenosis in distal internal carotid arteries. Patent vertebral arteries and basilar. 3 cm soft tissue mass in the deep lobe of the right parotid gland has increased in size over time. Benign lesions such as pleomorphic adenoma is statistically most likely, but low-grade malignant tumors can have a similar appearance. Enlarged left thyroid lobe, extends slightly below the level of the sternal notch. Mild mass effect on the left common carotid artery with no vascular compression. 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  Chronic bony remodeling, but no limitation in excursion.  -See separate neck CTA, which identified presence of sizable deep right parotid gland mass. It is possible at this could contribute to malalignment and distorted jaw excursion. Right jaw swells every so often      ROS:    Pt denies: Wt loss, Fever/Chills, HA, Visual changes, Fatigue, Chest pain, SOB, SALOMON, Abd pain, N/V/D/C, Blood in stool or urine, Edema. Pertinent positive as above in HPI. All others were negative    Patient Active Problem List   Diagnosis Code    Essential hypertension I10    Osteoporosis without current pathological fracture M81.0    Hyperlipidemia E78.5    Primary osteoarthritis of both knees M17.0    Advanced directives, counseling/discussion Z71.89    Overweight (BMI 25.0-29. 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 180 Tab 2    atorvastatin (LIPITOR) 10 mg tablet Take 1 Tab by mouth daily. 90 Tab 2    acetaminophen (TYLENOL) 325 mg tablet Take  by mouth every four (4) hours as needed for Pain.       aspirin (ASPIRIN LOW-STRENGTH) 81 mg chewable tablet CHEW AND SWALLOW ONE TABLET BY MOUTH DAILY 90 Tab 1    Blood-Glucose Meter (ACCU-CHEK SALLIE PLUS METER) misc accu check sallie plus meter DX: E11.65      FREESTYLE TEST strip PATIENT TESTING BLOOD SUGAR ONCE DAILY 1 Package 10    FREESTYLE LANCETS 28 gauge [facility] DAILY USE 1 Package 10       Social History     Tobacco Use   Smoking Status Never Smoker   Smokeless Tobacco Never Used       No Known Allergies    Patient Labs were reviewed: yes      Patient Past Records were reviewed:  yes        Objective:     Vitals:    07/17/19 1050   BP: 131/81   Pulse: (!) 56   Resp: 18   Temp: 97.1 °F (36.2 °C)   TempSrc: Oral   Weight: 155 lb 12.8 oz (70.7 kg)   Height: 5' 1\" (1.549 m)     Body mass index is 29.44 kg/m². Exam:   Appearance: alert, well appearing,  oriented to person, place, and time, acyanotic, in no respiratory distress and well hydrated. HEENT:  NC/AT, pink conj, anicteric sclerae  Neck:  No cervical lymphadenopathy, no JVD, no thyromegaly, no carotid bruit  Heart:  RRR without M/R/G  Lungs:  CTAB, no rhonchi, rales, or wheezes with good air exchange   Abdomen:  Non-tender, pos bowel sounds, no hepatosplenomegaly  Ext:  No C/C/E    Skin: no rash  Neuro: no lateralizing signs, CNs II-XII intact      Assessment/ Plan:   Diagnoses and all orders for this visit:    1. Type 2 diabetes mellitus with hyperglycemia, without long-term current use of insulin (HCC)  -     AMB POC HEMOGLOBIN A1C  -     AMB POC URINE, MICROALBUMIN, SEMIQUANTITATIVE    2. Essential hypertension  -     metoprolol tartrate (LOPRESSOR) 25 mg tablet; Take 1 Tab by mouth two (2) times a day. 3. Hyperlipidemia, unspecified hyperlipidemia type    4. Mass of parotid gland        Follow-up and Dispositions    · Return in about 3 months (around 10/17/2019) for follow up. I have discussed the diagnosis with the patient and the intended plan as seen in the above orders. The patient has received an After-Visit Summary and questions were answered concerning future plans. Medication Side Effects and Warnings were discussed with patient: yes    Patient verbalized understanding of above instructions.     Bruno Gaspar MD  Internal Medicine  [facility]

## 2019-08-15 ENCOUNTER — OFFICE VISIT (OUTPATIENT)
Dept: FAMILY MEDICINE CLINIC | Age: 84
End: 2019-08-15

## 2019-08-15 VITALS
RESPIRATION RATE: 16 BRPM | WEIGHT: 153 LBS | BODY MASS INDEX: 28.89 KG/M2 | TEMPERATURE: 98.7 F | OXYGEN SATURATION: 94 % | SYSTOLIC BLOOD PRESSURE: 111 MMHG | HEART RATE: 83 BPM | DIASTOLIC BLOOD PRESSURE: 69 MMHG | HEIGHT: 61 IN

## 2019-08-15 DIAGNOSIS — R11.2 NON-INTRACTABLE VOMITING WITH NAUSEA, UNSPECIFIED VOMITING TYPE: ICD-10-CM

## 2019-08-15 DIAGNOSIS — J40 BRONCHITIS: Primary | ICD-10-CM

## 2019-08-15 RX ORDER — ONDANSETRON 4 MG/1
4 TABLET, ORALLY DISINTEGRATING ORAL
Qty: 15 TAB | Refills: 0 | Status: SHIPPED | OUTPATIENT
Start: 2019-08-15 | End: 2019-10-23

## 2019-08-15 NOTE — PROGRESS NOTES
1. Have you been to the ER, urgent care clinic since your last visit? Hospitalized since your last visit? Yes, 8/14/19, [facility], bronchitis    2. Have you seen or consulted any other health care providers outside of the [address] [place] [address] since your last visit? Include any pap smears or colon screening. No     Patient presents in office today for routine care.   Patient concerns: diagnosed with bronchitis yesterday at [facility].

## 2019-08-15 NOTE — PATIENT INSTRUCTIONS
Ondansetron (By mouth, Into the mouth)   Ondansetron (on-DAN-se-arlene)  Prevents nausea and vomiting. Brand Name(s): Zofran, Zofran ODT, Zuplenz   There may be other brand names for this medicine. When This Medicine Should Not Be Used: This medicine is not right for everyone. Do not use it if you had an allergic reaction to ondansetron. How to Use This Medicine: Thin Sheet, Liquid, Tablet, Dissolving Tablet  · Your doctor will tell you how much medicine to use. Do not use more than directed. · Measure the oral liquid medicine with a marked measuring spoon, oral syringe, or medicine cup. · To use the disintegrating tablet:   ¨ Do not open the blister pack that contains the tablet until you are ready to take it. ¨ Make sure your hands are dry. Peel back the foil, then remove the tablet from the blister pack. Do not push the tablet through the foil. ¨ Place the tablet on top of your tongue where it will dissolve in seconds. After the tablet has melted, swallow or take a sip of water. · To use the soluble film:   ¨ Make sure your hands are clean and dry. ¨ Fold the pouch along the dotted line. ¨ While still folded, tear the pouch carefully along the edge. Remove the film from the pouch. ¨ Place the film on top of your tongue. It will dissolve in 4 to 20 seconds. Do not chew or swallow the film whole. ¨ After the film has dissolved, you may swallow with or without water. · Read and follow the patient instructions that come with this medicine. Talk to your doctor or pharmacist if you have any questions. · Missed dose: Take a dose as soon as you remember. If it is almost time for your next dose, wait until then and take a regular dose. Do not take extra medicine to make up for a missed dose. · Store the medicine in a closed container at room temperature, away from heat, moisture, and direct light. Keep the soluble film in the foil pouch until you ready to use it.   Drugs and Foods to Avoid:   Ask your doctor or pharmacist before using any other medicine, including over-the-counter medicines, vitamins, and herbal products. · Do not use this medicine together with apomorphine. · Some medicines may affect how ondansetron works. Tell your doctor if you are using tramadol, diuretics (water pills), or any other medicine for nausea and vomiting. Warnings While Using This Medicine:   · Tell your doctor if you are pregnant or breastfeeding, or if you have liver disease, congestive heart failure, heart rhythm problems (such as prolonged QT interval, slow heartbeat), low magnesium or potassium levels, stomach or bowel problems, or phenylketonuria (PKU). · This medicine may cause heart rhythm problems (such as QT prolongation). · This medicine may make you dizzy. Do not drive or do anything else that could be dangerous until you know how this medicine affects you. · Keep all medicine out of the reach of children. Never share your medicine with anyone. Possible Side Effects While Using This Medicine:   Call your doctor right away if you notice any of these side effects:  · Allergic reaction: Itching or hives, swelling in your face or hands, swelling or tingling in your mouth or throat, chest tightness, trouble breathing  · Fainting, dizziness, or lightheadedness  · Fast, pounding, or uneven heartbeat  · Trouble breathing  If you notice these less serious side effects, talk with your doctor:   · Constipation or diarrhea  · Headache  · Tiredness or weakness  If you notice other side effects that you think are caused by this medicine, tell your doctor. Call your doctor for medical advice about side effects. You may report side effects to FDA at 0-918-FDA-5340  © 2017 [facility] Information is for End User's use only and may not be sold, redistributed or otherwise used for commercial purposes. The above information is an  only.  It is not intended as medical advice for individual conditions or treatments. Talk to your doctor, nurse or pharmacist before following any medical regimen to see if it is safe and effective for you.

## 2019-08-15 NOTE — PROGRESS NOTES
Papito Esquivel is a 80 y.o. female and presents with Cough       Subjective: [facility] to [facility] and given ABX tessalon, and albuterol- improved today but struggling with emesis intermittently- once due to coughing but once was not related to this. No cp, no sob, No f/c. No diarrhea or abd pain    Assessment/Plan:      Bronchitis- nausea- complete course of abx and will rx zofran to use for nausea. Pt will call for any worsening or new sx. Diagnoses and all orders for this visit:    1. Bronchitis    2. Non-intractable vomiting with nausea, unspecified vomiting type    Other orders  -     ondansetron (ZOFRAN ODT) 4 mg disintegrating tablet; Take 1 Tab by mouth every eight (8) hours as needed for Nausea. RTC prn. Orders Placed This Encounter    ondansetron (ZOFRAN ODT) 4 mg disintegrating tablet     Sig: Take 1 Tab by mouth every eight (8) hours as needed for Nausea. Dispense:  15 Tab     Refill:  0               ROS:  Negative except as mentioned above  Cardiac- no chest pain or palpitations  Pulmonary- no sob or wheezes  GI- no n/v or diarrhea. SH:  Social History     Tobacco Use    Smoking status: Never Smoker    Smokeless tobacco: Never Used   Substance Use Topics    Alcohol use: No    Drug use: No         Medications/Allergies:  Current Outpatient Medications on File Prior to Visit   Medication Sig Dispense Refill    metoprolol tartrate (LOPRESSOR) 25 mg tablet Take 1 Tab by mouth two (2) times a day. 180 Tab 2    atorvastatin (LIPITOR) 10 mg tablet Take 1 Tab by mouth daily. 90 Tab 2    Blood-Glucose Meter (ACCU-CHEK SALLIE PLUS METER) misc accu check sallie plus meter DX: E11.65      acetaminophen (TYLENOL) 325 mg tablet Take  by mouth every four (4) hours as needed for Pain.       FREESTYLE TEST strip PATIENT TESTING BLOOD SUGAR ONCE DAILY 1 Package 10    FREESTYLE LANCETS 28 gauge [facility] DAILY USE 1 Package 10    aspirin (ASPIRIN LOW-STRENGTH) 81 mg chewable tablet CHEW AND SWALLOW ONE TABLET BY MOUTH DAILY 90 Tab 1     No current facility-administered medications on file prior to visit. No Known Allergies    Objective:  Visit Vitals  /69 (BP 1 Location: Left arm, BP Patient Position: Sitting)   Pulse 83   Temp 98.7 °F (37.1 °C) (Oral)   Resp 16   Ht 5' 1\" (1.549 m)   Wt 153 lb (69.4 kg)   SpO2 94%   BMI 28.91 kg/m²    Body mass index is 28.91 kg/m². Constitutional: Well developed, nourished, no distress, alert, nontoxic appearanc   CV: S1, S2.  RRR. No murmurs/rubs. No thrills palpated. No carotid bruits. Intact distal pulses. No edema. Pulm: No abnormalities on inspection. Clear to auscultation bilaterally. No wheezing/rhonchi. Normal effort. GI: Soft, nontender, nondistended. Normal active bowel sounds. No  masses on palpation. No hepatosplenomegaly.

## 2019-10-04 DIAGNOSIS — E78.5 HYPERLIPIDEMIA, UNSPECIFIED HYPERLIPIDEMIA TYPE: ICD-10-CM

## 2019-10-05 RX ORDER — ATORVASTATIN CALCIUM 10 MG/1
10 TABLET, FILM COATED ORAL DAILY
Qty: 90 TAB | Refills: 2 | Status: SHIPPED | OUTPATIENT
Start: 2019-10-05 | End: 2020-06-12 | Stop reason: SDUPTHER

## 2019-10-18 NOTE — TELEPHONE ENCOUNTER
----- Message from Tricia Mack MD sent at 4/9/2019 12:09 PM EDT -----  pls let daughter know her kidney function is back to normal 18-Oct-2019 05:10

## 2019-10-23 ENCOUNTER — OFFICE VISIT (OUTPATIENT)
Dept: FAMILY MEDICINE CLINIC | Age: 84
End: 2019-10-23

## 2019-10-23 VITALS
WEIGHT: 154 LBS | OXYGEN SATURATION: 96 % | HEIGHT: 61 IN | RESPIRATION RATE: 18 BRPM | BODY MASS INDEX: 29.07 KG/M2 | DIASTOLIC BLOOD PRESSURE: 70 MMHG | TEMPERATURE: 97.2 F | SYSTOLIC BLOOD PRESSURE: 136 MMHG | HEART RATE: 53 BPM

## 2019-10-23 DIAGNOSIS — E78.5 HYPERLIPIDEMIA, UNSPECIFIED HYPERLIPIDEMIA TYPE: ICD-10-CM

## 2019-10-23 DIAGNOSIS — Z23 ENCOUNTER FOR IMMUNIZATION: ICD-10-CM

## 2019-10-23 DIAGNOSIS — E11.65 TYPE 2 DIABETES MELLITUS WITH HYPERGLYCEMIA, WITHOUT LONG-TERM CURRENT USE OF INSULIN (HCC): ICD-10-CM

## 2019-10-23 DIAGNOSIS — M81.0 AGE-RELATED OSTEOPOROSIS WITHOUT CURRENT PATHOLOGICAL FRACTURE: ICD-10-CM

## 2019-10-23 DIAGNOSIS — I10 ESSENTIAL HYPERTENSION: ICD-10-CM

## 2019-10-23 DIAGNOSIS — E66.3 OVERWEIGHT (BMI 25.0-29.9): ICD-10-CM

## 2019-10-23 DIAGNOSIS — K11.8 MASS OF PAROTID GLAND: ICD-10-CM

## 2019-10-23 DIAGNOSIS — Z00.00 MEDICARE ANNUAL WELLNESS VISIT, SUBSEQUENT: Primary | ICD-10-CM

## 2019-10-23 DIAGNOSIS — Z71.89 ADVANCED DIRECTIVES, COUNSELING/DISCUSSION: ICD-10-CM

## 2019-10-23 LAB
HBA1C MFR BLD HPLC: 5.6 %
MICROALBUMIN UR TEST STR-MCNC: 150 MG/L
MICROALBUMIN/CREAT RATIO POC: >300 MG/G

## 2019-10-23 NOTE — PROGRESS NOTES
Chief Complaint   Patient presents with    Follow Up Chronic Condition     3 month; A1C and Microalbumin; patient not fasting (coffee and pastry)    Immunization/Injection     Influenza vaccine    Annual Wellness Visit       Pt is a 80y.o. year old female who presents for follow up of her chronic medical problems  Here with her daughter who usually brings her    Health Maintenance Due   Topic Date Due    Shingrix Vaccine Age 49> (1 of 2) 12/30/1979    DTaP/Tdap/Td series (1 - Tdap) 01/12/2013    MICROALBUMIN Q1 -today 05/15/2019    Influenza Age 9 to Adult -today 08/01/2019    MEDICARE YEARLY EXAM  10/05/2019    FOOT EXAM Q1 -today 10/25/2019       BP Readings from Last 3 Encounters:   10/23/19 143/72   08/15/19 111/69   07/17/19 131/81   Repeat BP-better  Lopressor 25 BID-compliant    Wt Readings from Last 3 Encounters:   10/23/19 154 lb (69.9 kg)   08/15/19 153 lb (69.4 kg)   07/17/19 155 lb 12.8 oz (70.7 kg)     Lab Results   Component Value Date/Time    Cholesterol, total 152 10/23/2019 10:38 AM    HDL Cholesterol 44 10/23/2019 10:38 AM    LDL, calculated 85 10/23/2019 10:38 AM    VLDL, calculated 23 10/23/2019 10:38 AM    Triglyceride 115 10/23/2019 10:38 AM    CHOL/HDL Ratio 3.8 09/23/2010 01:08 PM   on Lipitor-no side effects, compliant    Last Point of Care HGB A1C  Hemoglobin A1c (POC)   Date Value Ref Range Status   10/23/2019 5.6 % Final    not on meds    Lives with grand daughter    No new complaints regarding her right jaw pain/mass    ROS:    Pt denies: Wt loss, Fever/Chills, HA, Visual changes, Fatigue, Chest pain, SOB, SALOMON, Abd pain, N/V/D/C, Blood in stool or urine, Edema. Pertinent positive as above in HPI.  All others were negative    Patient Active Problem List   Diagnosis Code    Essential hypertension I10    Osteoporosis without current pathological fracture M81.0    Hyperlipidemia E78.5    Primary osteoarthritis of both knees M17.0    Advanced directives, counseling/discussion Z71.89    Overweight (BMI 25.0-29. 9) E66.3    Type 2 diabetes mellitus with hyperglycemia, without long-term current use of insulin ([facility]) E11.65    CKD (chronic kidney disease) stage 3, GFR 30-59 ml/min ([facility]) N18.3    Type 2 diabetes with nephropathy ([facility]) E11.21    Paroxysmal tachycardia ([facility]) I47.9    Mass of parotid gland K11.8       Past Medical History:   Diagnosis Date    Diabetes ([facility]     Essential hypertension 5/25/2017    Hyperlipidemia 5/25/2017    Paroxysmal tachycardia ([facility] 9/18/2018       Current Outpatient Medications   Medication Sig Dispense Refill    atorvastatin (LIPITOR) 10 mg tablet Take 1 Tab by mouth daily. 90 Tab 2    metoprolol tartrate (LOPRESSOR) 25 mg tablet Take 1 Tab by mouth two (2) times a day. 180 Tab 2    Blood-Glucose Meter (ACCU-CHEK SALLIE PLUS METER) misc accu check sallie plus meter DX: E11.65      acetaminophen (TYLENOL) 325 mg tablet Take  by mouth every four (4) hours as needed for Pain.  FREESTYLE TEST strip PATIENT TESTING BLOOD SUGAR ONCE DAILY 1 Package 10    FREESTYLE LANCETS 28 gauge misc DAILY USE 1 Package 10       Social History     Tobacco Use   Smoking Status Never Smoker   Smokeless Tobacco Never Used       No Known Allergies    Patient Labs were reviewed: yes      Patient Past Records were reviewed:  yes        Objective:     Vitals:    10/23/19 0927 10/23/19 1018   BP: 143/72 136/70   Pulse: (!) 53    Resp: 18    Temp: 97.2 °F (36.2 °C)    SpO2: 96%    Weight: 154 lb (69.9 kg)    Height: 5' 1\" (1.549 m)      Body mass index is 29.1 kg/m². Exam:   Appearance: alert, well appearing,  oriented to person, place, and time, acyanotic, in no respiratory distress and well hydrated.   HEENT:  NC/AT, pink conj, anicteric sclerae  Neck:  No cervical lymphadenopathy, no JVD, no thyromegaly, no carotid bruit  Heart:  RRR without M/R/G  Lungs:  CTAB, no rhonchi, rales, or wheezes with good air exchange   Abdomen:  Non-tender, pos bowel sounds, no hepatosplenomegaly  Ext:  No C/C/E    Skin: no rash  Neuro: no lateralizing signs, CNs II-XII intact  Diabetic foot exam:     Left Foot:   Visual Exam: normal    Pulse DP: 2+ (normal)   Filament test: normal sensation    Vibratory sensation: normal      Right Foot:   Visual Exam: normal    Pulse DP: 2+ (normal)   Filament test: normal sensation    Vibratory sensation: normal      Assessment/ Plan:   Diagnoses and all orders for this visit:    1. Medicare annual wellness visit, subsequent-see note below    2. Type 2 diabetes mellitus with hyperglycemia, without long-term current use of insulin (HCC)-continue to watch diet  -     AMB POC HEMOGLOBIN A1C  -     LIPID PANEL; Future  -      DIABETES FOOT EXAM  -     AMB POC URINE, MICROALBUMIN, SEMIQUANTITATIVE    3. Age-related osteoporosis without current pathological fracture-continue with daily Ca+D; was on anti-resorptive therapy for over 5 yrs    4. Essential hypertension-controlled, continue with Lopressor  -     CBC WITH AUTOMATED DIFF; Future  -     METABOLIC PANEL, COMPREHENSIVE; Future    5. Mass of parotid gland-currently asymptomatic      6. Hyperlipidemia, unspecified hyperlipidemia type-at goal on Lipitor  -     LIPID PANEL; Future    7. Overweight (BMI 25.0-29.9)-will just monitor weight at her age        Follow-up and Dispositions    · Return in about 3 months (around 1/23/2020) for follow up. I have discussed the diagnosis with the patient and the intended plan as seen in the above orders. The patient has received an After-Visit Summary and questions were answered concerning future plans. Medication Side Effects and Warnings were discussed with patient: yes    Patient verbalized understanding of above instructions.     Subha Escalante MD  Internal Medicine  [facility]    This is the Subsequent Medicare Annual Wellness Exam, performed 12 months or more after the Initial AWV or the last Subsequent AWV    I have reviewed the patient's medical history in detail and updated the computerized patient record. History     Past Medical History:   Diagnosis Date    [facility]     Essential hypertension 5/25/2017    Hyperlipidemia 5/25/2017    Paroxysmal tachycardia ([facility] 9/18/2018      Past Surgical History:   Procedure Laterality Date    HX HYSTERECTOMY       Current Outpatient Medications   Medication Sig Dispense Refill    atorvastatin (LIPITOR) 10 mg tablet Take 1 Tab by mouth daily. 90 Tab 2    metoprolol tartrate (LOPRESSOR) 25 mg tablet Take 1 Tab by mouth two (2) times a day. 180 Tab 2    Blood-Glucose Meter (ACCU-CHEK SALLIE PLUS METER) misc accu check sallie plus meter DX: E11.65      acetaminophen (TYLENOL) 325 mg tablet Take  by mouth every four (4) hours as needed for Pain.  FREESTYLE TEST strip PATIENT TESTING BLOOD SUGAR ONCE DAILY 1 Package 10    FREESTYLE LANCETS 28 gauge misc DAILY USE 1 Package 10     No Known Allergies  Family History   Problem Relation Age of Onset    Kidney Disease Mother     Hypertension Sister     Kidney Disease Brother      Social History     Tobacco Use    Smoking status: Never Smoker    Smokeless tobacco: Never Used   Substance Use Topics    Alcohol use: No     Patient Active Problem List   Diagnosis Code    Essential hypertension I10    Osteoporosis without current pathological fracture M81.0    Hyperlipidemia E78.5    Primary osteoarthritis of both knees M17.0    Advanced directives, counseling/discussion Z71.89    Overweight (BMI 25.0-29. 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 Functional Ability and Level of Safety:   Hearing Loss  Hearing is good. Activities of Daily Living  The home contains: no safety equipment. being installed soon-redoing bathroom at her place  Patient does total self care    Fall Risk  Fall Risk Assessment, last 12 mths 7/17/2019   Able to walk? Yes   Fall in past 12 months? No   Fall with injury? -   Number of falls in past 12 months -   Fall Risk Score -       Abuse Screen  Patient is not abused    Cognitive Screening   Evaluation of Cognitive Function:  Has your family/caregiver stated any concerns about your memory: no  Normal, Clock Drawing test    Patient Care Team   Patient Care Team:  Jerry Doyle MD as PCP - General (Internal Medicine)  Jerry Doyle MD as PCP - [facility] Empaneled Provider  Юлия Delgado MD (Ophthalmology)  Arlette Hobson MD as Physician (Otolaryngology)    Assessment/Plan   Education and counseling provided:  Are appropriate based on today's review and evaluation  End-of-Life planning (with patient's consent)-see ACP note  Pneumococcal Vaccine-done  Influenza Vaccine-today  Screening Mammography-no need  Cardiovascular screening blood test-fasting lipids up to date  Bone mass measurement (DEXA)-osteoporosis, s/p tx  Screening for glaucoma-eye exam up to date    Diagnoses and all orders for this visit:    1. Medicare annual wellness visit, subsequent-Refer to above for plan and to patient instructions for recommendations on HM      2. Encounter for immunization  -     INFLUENZA VACCINE INACTIVATED (IIV), SUBUNIT, ADJUVANTED, IM  -     ADMIN INFLUENZA VIRUS VAC    3.  Advanced directives discussed with patient and her daughter today    Health Maintenance Due   Topic Date Due    Shingrix Vaccine Age 49> (1 of 2) 12/30/1979    DTaP/Tdap/Td series (1 - Tdap) 01/12/2013     RTC yearly for wellness visit

## 2019-10-23 NOTE — PROGRESS NOTES
Chief Complaint   Patient presents with    Follow Up Chronic Condition     3 month; A1C and Microalbumin; patient not fasting (coffee and pastry)    Immunization/Injection     Influenza vaccine       1. Have you been to the ER, urgent care clinic since your last visit? Hospitalized since your last visit? No    2. Have you seen or consulted any other health care providers outside of the [address] [place] [address] since your last visit? Include any pap smears or colon screening. Loraine Romeo is a 80 y.o. female who presents for routine immunizations. She denies any symptoms , reactions or allergies that would exclude them from being immunized today. Risks and adverse reactions were discussed and the VIS was given to them. All questions were addressed. She was observed for 20 min post injection. There were no reactions observed.     Rosa Bolivar LPN

## 2019-10-23 NOTE — PATIENT INSTRUCTIONS
Medicare Wellness Visit, Female The best way to live healthy is to have a lifestyle where you eat a well-balanced diet, exercise regularly, limit alcohol use, and quit all forms of tobacco/nicotine, if applicable. Regular preventive services are another way to keep healthy. Preventive services (vaccines, screening tests, monitoring & exams) can help personalize your care plan, which helps you manage your own care. Screening tests can find health problems at the earliest stages, when they are easiest to treat. Damon Avila follows the current, evidence-based guidelines published by the [facility] ([facility]) when recommending preventive services for our patients. Because we follow these guidelines, sometimes recommendations change over time as research supports it. (For example, mammograms used to be recommended annually. Even though Medicare will still pay for an annual mammogram, the newer guidelines recommend a mammogram every two years for women of average risk.) Of course, you and your doctor may decide to screen more often for some diseases, based on your risk and your health status. Preventive services for you include: - Medicare offers their members a free annual wellness visit, which is time for you and your primary care provider to discuss and plan for your preventive service needs. Take advantage of this benefit every year! 
-All adults over the age of 72 should receive the recommended pneumonia vaccines. Current USPSTF guidelines recommend a series of two vaccines for the best pneumonia protection.  
-All adults should have a flu vaccine yearly and a tetanus vaccine every 10 years. All adults age 61 and older should receive a shingles vaccine once in their lifetime.   
-A bone mass density test is recommended when a woman turns 65 to screen for osteoporosis. This test is only recommended one time, as a screening. Some providers will use this same test as a disease monitoring tool if you already have osteoporosis. -All adults age 38-68 who are overweight should have a diabetes screening test once every three years.  
-Other screening tests and preventive services for persons with diabetes include: an eye exam to screen for diabetic retinopathy, a kidney function test, a foot exam, and stricter control over your cholesterol.  
-Cardiovascular screening for adults with routine risk involves an electrocardiogram (ECG) at intervals determined by your doctor.  
-Colorectal cancer screenings should be done for adults age 54-65 with no increased risk factors for colorectal cancer. There are a number of acceptable methods of screening for this type of cancer. Each test has its own benefits and drawbacks. Discuss with your doctor what is most appropriate for you during your annual wellness visit. The different tests include: colonoscopy (considered the best screening method), a fecal occult blood test, a fecal DNA test, and sigmoidoscopy. -Breast cancer screenings are recommended every other year for women of normal risk, age 54-69. 
-Cervical cancer screenings for women over age 72 are only recommended with certain risk factors.  
-All adults born between [facility] should be screened once for Hepatitis C. Here is a list of your current Health Maintenance items (your personalized list of preventive services) with a due date: 
Health Maintenance Due Topic Date Due  Shingles Vaccine (1 of 2) 12/30/1979  
 DTaP/Tdap/Td  (1 - Tdap) 01/12/2013  Albumin Urine Test  05/15/2019  Flu Vaccine  08/01/2019 Clary Watt Annual Well Visit  10/05/2019 Clary Watt Diabetic Foot Care  10/25/2019

## 2019-10-23 NOTE — ACP (ADVANCE CARE PLANNING)
Advance Care Planning (ACP) Provider Conversation Snapshot    Date of ACP Conversation: 10/23/19  Persons included in Conversation:  patient  Length of ACP Conversation in minutes:  <16 minutes (Non-Billable)    Authorized Decision Maker (if patient is incapable of making informed decisions):    This person is:   her daughterRaymundo at [phone]            For Patients with Decision Making Capacity:   Values/Goals: Exploration of values, goals, and preferences if recovery is not expected, even with continued medical treatment in the event of:  Imminent death  Severe, permanent brain injury    Conversation Outcomes / Follow-Up Plan:   Recommended completion of Advance Directive form after review of ACP materials and conversation with prospective healthcare agent

## 2019-10-24 LAB
ALBUMIN SERPL-MCNC: 4.1 G/DL (ref 3.5–4.7)
ALBUMIN/GLOB SERPL: 1.6 {RATIO} (ref 1.2–2.2)
ALP SERPL-CCNC: 83 IU/L (ref 39–117)
ALT SERPL-CCNC: 12 IU/L (ref 0–32)
AST SERPL-CCNC: 16 IU/L (ref 0–40)
BASOPHILS # BLD AUTO: 0.1 X10E3/UL (ref 0–0.2)
BASOPHILS NFR BLD AUTO: 1 %
BILIRUB SERPL-MCNC: 0.4 MG/DL (ref 0–1.2)
BUN SERPL-MCNC: 10 MG/DL (ref 8–27)
BUN/CREAT SERPL: 10 (ref 12–28)
CALCIUM SERPL-MCNC: 8.9 MG/DL (ref 8.7–10.3)
CHLORIDE SERPL-SCNC: 106 MMOL/L (ref 96–106)
CHOLEST SERPL-MCNC: 152 MG/DL (ref 100–199)
CO2 SERPL-SCNC: 23 MMOL/L (ref 20–29)
CREAT SERPL-MCNC: 1.03 MG/DL (ref 0.57–1)
EOSINOPHIL # BLD AUTO: 0.1 X10E3/UL (ref 0–0.4)
EOSINOPHIL NFR BLD AUTO: 2 %
ERYTHROCYTE [DISTWIDTH] IN BLOOD BY AUTOMATED COUNT: 15.2 % (ref 12.3–15.4)
GLOBULIN SER CALC-MCNC: 2.5 G/DL (ref 1.5–4.5)
GLUCOSE SERPL-MCNC: 82 MG/DL (ref 65–99)
HCT VFR BLD AUTO: 39.9 % (ref 34–46.6)
HDLC SERPL-MCNC: 44 MG/DL
HGB BLD-MCNC: 12.7 G/DL (ref 11.1–15.9)
IMM GRANULOCYTES # BLD AUTO: 0 X10E3/UL (ref 0–0.1)
IMM GRANULOCYTES NFR BLD AUTO: 0 %
INTERPRETATION, 910389: NORMAL
INTERPRETATION: NORMAL
LDLC SERPL CALC-MCNC: 85 MG/DL (ref 0–99)
LYMPHOCYTES # BLD AUTO: 1.8 X10E3/UL (ref 0.7–3.1)
LYMPHOCYTES NFR BLD AUTO: 34 %
MCH RBC QN AUTO: 25.6 PG (ref 26.6–33)
MCHC RBC AUTO-ENTMCNC: 31.8 G/DL (ref 31.5–35.7)
MCV RBC AUTO: 80 FL (ref 79–97)
MONOCYTES # BLD AUTO: 0.4 X10E3/UL (ref 0.1–0.9)
MONOCYTES NFR BLD AUTO: 7 %
NEUTROPHILS # BLD AUTO: 2.9 X10E3/UL (ref 1.4–7)
NEUTROPHILS NFR BLD AUTO: 56 %
PDF IMAGE, 910387: NORMAL
PLATELET # BLD AUTO: 270 X10E3/UL (ref 150–450)
POTASSIUM SERPL-SCNC: 3.7 MMOL/L (ref 3.5–5.2)
PROT SERPL-MCNC: 6.6 G/DL (ref 6–8.5)
RBC # BLD AUTO: 4.96 X10E6/UL (ref 3.77–5.28)
SODIUM SERPL-SCNC: 146 MMOL/L (ref 134–144)
TRIGL SERPL-MCNC: 115 MG/DL (ref 0–149)
VLDLC SERPL CALC-MCNC: 23 MG/DL (ref 5–40)
WBC # BLD AUTO: 5.2 X10E3/UL (ref 3.4–10.8)

## 2020-01-06 ENCOUNTER — OFFICE VISIT (OUTPATIENT)
Dept: FAMILY MEDICINE CLINIC | Age: 85
End: 2020-01-06

## 2020-01-06 VITALS
BODY MASS INDEX: 29.27 KG/M2 | HEIGHT: 61 IN | HEART RATE: 59 BPM | SYSTOLIC BLOOD PRESSURE: 133 MMHG | RESPIRATION RATE: 12 BRPM | TEMPERATURE: 98.7 F | DIASTOLIC BLOOD PRESSURE: 70 MMHG | WEIGHT: 155 LBS

## 2020-01-06 DIAGNOSIS — J06.9 VIRAL UPPER RESPIRATORY TRACT INFECTION: Primary | ICD-10-CM

## 2020-01-06 RX ORDER — LOSARTAN POTASSIUM AND HYDROCHLOROTHIAZIDE 12.5; 5 MG/1; MG/1
1 TABLET ORAL
COMMUNITY
Start: 2019-12-13 | End: 2020-01-23

## 2020-01-06 RX ORDER — CETIRIZINE HYDROCHLORIDE 5 MG/1
5 TABLET ORAL DAILY
Qty: 30 TAB | Refills: 0 | Status: SHIPPED | OUTPATIENT
Start: 2020-01-06 | End: 2022-02-09

## 2020-01-06 NOTE — PATIENT INSTRUCTIONS
Upper respiratory infections/sinusitis/colds    Take an antihistamine such as Zyrtec, Claritin, Xyzal  or Allegra for sinus and nasal congestion. All are available over the counter, generics are fine. Try a nasal rinse with a neti pot, or device of choice. Do not use tap water. Use distilled or sterile water available at the pharmacy. Ibuprofen or tylenol for aches and pains. They can be taken alternating between the two. Flonase nasal spray for nasal congestion, use daily. This is available over the counter. Flonase decreases inflammation and afrin dries up congestion. Take over the counter cough medication for cough. If you have high blood pressure, Coricidin has a high blood pressure formulation.    Cough Drops, menthol  Bowl of hot water with teaspoon of Les vapor rub, place towel over your head and breath in the mist

## 2020-01-06 NOTE — PROGRESS NOTES
Chief Complaint   Patient presents with    Cough     x 6 days        1. Have you been to the ER, urgent care clinic since your last visit? Hospitalized since your last visit? NO    2. Have you seen or consulted any other health care providers outside of the [address] [place] [address] since your last visit? Include any pap smears or colon screening.  NO

## 2020-01-06 NOTE — PROGRESS NOTES
[facility]               [address]               [phone]    Subjective: Heaven Palacio is a 80 y.o. female who complains of sneezing, post nasal drip, dry cough, cough described as dry, paroxysmal and hoarseness for 6 days, gradually worsening since that time. She denies a history of anorexia, chest pain, chills, dizziness, fatigue, fevers, myalgias, nausea and shortness of breath. Evaluation to date: none. Treatment to date: cough suppressants, OTC products. Patient does not smoke cigarettes. Relevant PMH: No pertinent additional PMH. Patient Active Problem List   Diagnosis Code    Essential hypertension I10    Osteoporosis without current pathological fracture M81.0    Hyperlipidemia E78.5    Primary osteoarthritis of both knees M17.0    Advanced directives, counseling/discussion Z71.89    Overweight (BMI 25.0-29. 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     Patient Active Problem List    Diagnosis Date Noted    Mass of parotid gland 01/08/2019    Type 2 diabetes with nephropathy ([facility] 09/26/2018    Paroxysmal tachycardia ([facility] 09/18/2018    CKD (chronic kidney disease) stage 3, GFR 30-59 ml/min ([facility] 05/15/2018    Type 2 diabetes mellitus with hyperglycemia, without long-term current use of insulin ([facility] 02/26/2018    Overweight (BMI 25.0-29.9) 01/09/2018    Advanced directives, counseling/discussion 10/10/2017    Essential hypertension 05/25/2017    Osteoporosis without current pathological fracture 05/25/2017    Hyperlipidemia 05/25/2017    Primary osteoarthritis of both knees 05/25/2017     Current Outpatient Medications   Medication Sig Dispense Refill    losartan-hydroCHLOROthiazide (HYZAAR) 50-12.5 mg per tablet Take 1 Tab by mouth.  cetirizine (ZYRTEC) 5 mg tablet Take 1 Tab by mouth daily. 30 Tab 0    atorvastatin (LIPITOR) 10 mg tablet Take 1 Tab by mouth daily. 90 Tab 2    acetaminophen (TYLENOL) 325 mg tablet Take  by mouth every four (4) hours as needed for Pain.  metoprolol tartrate (LOPRESSOR) 25 mg tablet Take 1 Tab by mouth two (2) times a day. 180 Tab 2    Blood-Glucose Meter (ACCU-CHEK SALLIE PLUS METER) misc accu check sallie plus meter DX: E11.65      FREESTYLE TEST strip PATIENT TESTING BLOOD SUGAR ONCE DAILY 1 Package 10    FREESTYLE LANCETS 28 gauge [facility] DAILY USE 1 Package 10     No Known Allergies  Past Medical History:   Diagnosis Date    Diabetes [facility]     Essential hypertension 5/25/2017    Hyperlipidemia 5/25/2017    Paroxysmal tachycardia ([facility] 9/18/2018     Past Surgical History:   Procedure Laterality Date    HX HYSTERECTOMY       Family History   Problem Relation Age of Onset    Kidney Disease Mother     Hypertension Sister     Kidney Disease Brother      Social History     Tobacco Use    Smoking status: Never Smoker    Smokeless tobacco: Never Used   Substance Use Topics    Alcohol use: No        Review of Systems  Pertinent items are noted in HPI. Objective:     Visit Vitals  /70   Pulse (!) 59   Temp 98.7 °F (37.1 °C) (Oral)   Resp 12   Ht 5' 1\" (1.549 m)   Wt 155 lb (70.3 kg)   BMI 29.29 kg/m²     General:  alert, cooperative, no distress   Eyes: negative   Ears: abnormal TM AS - air-fluid level   Sinuses: Normal paranasal sinuses without tenderness   Mouth:  Lips, mucosa, and tongue normal. Teeth and gums normal and abnormal findings: moderate oropharyngeal erythema   Neck: supple, symmetrical, trachea midline and no adenopathy. Heart: S1 and S2 normal, no murmurs noted. Lungs: clear to auscultation bilaterally   Abdomen: soft, non-tender.  Bowel sounds normal. No masses,  no organomegaly        Assessment/Plan:   viral upper respiratory illness  Suggested symptomatic OTC remedies. RTC prn. Discussed diagnosis and treatment of viral URIs. Discussed the importance of avoiding unnecessary antibiotic therapy. ICD-10-CM ICD-9-CM    1. Viral upper respiratory tract infection J06.9 465.9 cetirizine (ZYRTEC) 5 mg tablet   Most likely viral upper respiratory infection  Recommendations for symptomatic relief given    An After Visit Summary was printed and given to the patient. All diagnosis have been discussed with the patient and all of the patient's questions have been answered. Follow-up and Dispositions    · Return if symptoms worsen or fail to improve. Varsha Deras, [place] [id number] [place] [address] Rd.   [facility], [place] [address]

## 2020-01-23 ENCOUNTER — OFFICE VISIT (OUTPATIENT)
Dept: FAMILY MEDICINE CLINIC | Age: 85
End: 2020-01-23

## 2020-01-23 VITALS
RESPIRATION RATE: 17 BRPM | HEIGHT: 61 IN | TEMPERATURE: 96.8 F | DIASTOLIC BLOOD PRESSURE: 71 MMHG | OXYGEN SATURATION: 96 % | WEIGHT: 154 LBS | SYSTOLIC BLOOD PRESSURE: 109 MMHG | HEART RATE: 60 BPM | BODY MASS INDEX: 29.07 KG/M2

## 2020-01-23 DIAGNOSIS — E78.5 HYPERLIPIDEMIA, UNSPECIFIED HYPERLIPIDEMIA TYPE: ICD-10-CM

## 2020-01-23 DIAGNOSIS — R42 DIZZINESS: ICD-10-CM

## 2020-01-23 DIAGNOSIS — I10 ESSENTIAL HYPERTENSION: Primary | ICD-10-CM

## 2020-01-23 DIAGNOSIS — N18.30 CKD (CHRONIC KIDNEY DISEASE) STAGE 3, GFR 30-59 ML/MIN (HCC): ICD-10-CM

## 2020-01-23 DIAGNOSIS — E11.65 TYPE 2 DIABETES MELLITUS WITH HYPERGLYCEMIA, WITHOUT LONG-TERM CURRENT USE OF INSULIN (HCC): ICD-10-CM

## 2020-01-23 PROBLEM — I47.9 PAROXYSMAL TACHYCARDIA (HCC): Status: RESOLVED | Noted: 2018-09-18 | Resolved: 2020-01-23

## 2020-01-23 NOTE — PATIENT INSTRUCTIONS
Dizziness: Care Instructions Your Care Instructions Dizziness is the feeling of unsteadiness or fuzziness in your head. It is different than having vertigo, which is a feeling that the room is spinning or that you are moving or falling. It is also different from lightheadedness, which is the feeling that you are about to faint. It can be hard to know what causes dizziness. Some people feel dizzy when they have migraine headaches. Sometimes bouts of flu can make you feel dizzy. Some medical conditions, such as heart problems or high blood pressure, can make you feel dizzy. Many medicines can cause dizziness, including medicines for high blood pressure, pain, or anxiety. If a medicine causes your symptoms, your doctor may recommend that you stop or change the medicine. If it is a problem with your heart, you may need medicine to help your heart work better. If there is no clear reason for your symptoms, your doctor may suggest watching and waiting for a while to see if the dizziness goes away on its own. Follow-up care is a key part of your treatment and safety. Be sure to make and go to all appointments, and call your doctor if you are having problems. It's also a good idea to know your test results and keep a list of the medicines you take. How can you care for yourself at home? · If your doctor recommends or prescribes medicine, take it exactly as directed. Call your doctor if you think you are having a problem with your medicine. · Do not drive while you feel dizzy. · Try to prevent falls. Steps you can take include: ? Using nonskid mats, adding grab bars near the tub, and using night-lights. ? Clearing your home so that walkways are free of anything you might trip on. 
? Letting family and friends know that you have been feeling dizzy. This will help them know how to help you. When should you call for help? Call 911 anytime you think you may need emergency care. For example, call if:   · You passed out (lost consciousness).  
  · You have dizziness along with symptoms of a heart attack. These may include: 
? Chest pain or pressure, or a strange feeling in the chest. 
? Sweating. ? Shortness of breath. ? Nausea or vomiting. ? Pain, pressure, or a strange feeling in the back, neck, jaw, or upper belly or in one or both shoulders or arms. ? Lightheadedness or sudden weakness. ? A fast or irregular heartbeat.  
  · You have symptoms of a stroke. These may include: 
? Sudden numbness, tingling, weakness, or loss of movement in your face, arm, or leg, especially on only one side of your body. ? Sudden vision changes. ? Sudden trouble speaking. ? Sudden confusion or trouble understanding simple statements. ? Sudden problems with walking or balance. ? A sudden, severe headache that is different from past headaches.  
 Call your doctor now or seek immediate medical care if: 
  · You feel dizzy and have a fever, headache, or ringing in your ears.  
  · You have new or increased nausea and vomiting.  
  · Your dizziness does not go away or comes back.  
 Watch closely for changes in your health, and be sure to contact your doctor if: 
  · You do not get better as expected. Where can you learn more? Go to http://kosta-nagle.info/. Enter Y175 in the search box to learn more about \"Dizziness: Care Instructions. \" Current as of: June 26, 2019 Content Version: 12.2 © 4455-7179 [employer]. Care instructions adapted under license by [employer] (which disclaims liability or warranty for this information). If you have questions about a medical condition or this instruction, always ask your healthcare professional. [place] [address] any warranty or liability for your use of this information.

## 2020-01-23 NOTE — PROGRESS NOTES
Chief Complaint   Patient presents with    Follow Up Chronic Condition     3 month DM, HTN; patient not fasting    Dizziness     Since last visit, medication was discontinued. 1. Have you been to the ER, urgent care clinic since your last visit? Hospitalized since your last visit? No    2. Have you seen or consulted any other health care providers outside of the [address] [place] [address] since your last visit? Include any pap smears or colon screening.  No

## 2020-01-23 NOTE — PROGRESS NOTES
Chief Complaint   Patient presents with    Follow Up Chronic Condition     3 month DM, HTN; patient not fasting    Dizziness     Since last visit, medication was discontinued. Pt is a 80y.o. year old female who presents for follow up of her chronic medical problems    Here with yael today    Dizzy spells are back-daily, 2x a day  No feeling of movement  Usually happens while standing    BP Readings from Last 3 Encounters:   01/23/20 109/71   01/06/20 133/70   10/23/19 136/70   Off Losartan since last visit      Last Point of Care HGB A1C  Hemoglobin A1c (POC)   Date Value Ref Range Status   10/23/2019 5.6 % Final    not on oral hypoglycemics    Wt Readings from Last 3 Encounters:   01/23/20 154 lb (69.9 kg)   01/06/20 155 lb (70.3 kg)   10/23/19 154 lb (69.9 kg)       Lab Results   Component Value Date/Time    Cholesterol, total 152 10/23/2019 10:38 AM    HDL Cholesterol 44 10/23/2019 10:38 AM    LDL, calculated 85 10/23/2019 10:38 AM    VLDL, calculated 23 10/23/2019 10:38 AM    Triglyceride 115 10/23/2019 10:38 AM    CHOL/HDL Ratio 3.8 09/23/2010 01:08 PM   on Lipitor    ROS:    Pt denies: Wt loss, Fever/Chills, HA, Visual changes, Fatigue, Chest pain, SOB, SALOMON, Abd pain, N/V/D/C, Blood in stool or urine, Edema. Pertinent positive as above in HPI. All others were negative    Patient Active Problem List   Diagnosis Code    Essential hypertension I10    Osteoporosis without current pathological fracture M81.0    Hyperlipidemia E78.5    Primary osteoarthritis of both knees M17.0    Advanced directives, counseling/discussion Z71.89    Overweight (BMI 25.0-29. 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 30 Tab 0    atorvastatin (LIPITOR) 10 mg tablet Take 1 Tab by mouth daily. 90 Tab 2    metoprolol tartrate (LOPRESSOR) 25 mg tablet Take 1 Tab by mouth two (2) times a day. 180 Tab 2    Blood-Glucose Meter (ACCU-CHEK SALLIE PLUS METER) misc accu check sallie plus meter DX: E11.65      acetaminophen (TYLENOL) 325 mg tablet Take  by mouth every four (4) hours as needed for Pain.  FREESTYLE TEST strip PATIENT TESTING BLOOD SUGAR ONCE DAILY 1 Package 10    FREESTYLE LANCETS 28 gauge misc DAILY USE 1 Package 10       Social History     Tobacco Use   Smoking Status Never Smoker   Smokeless Tobacco Never Used       No Known Allergies    Patient Labs were reviewed: yes      Patient Past Records were reviewed:  yes        Objective:     Vitals:    01/23/20 1053   BP: 109/71   Pulse: 60   Resp: 17   Temp: 96.8 °F (36 °C)   TempSrc: Oral   SpO2: 96%   Weight: 154 lb (69.9 kg)   Height: 5' 1\" (1.549 m)     Body mass index is 29.1 kg/m². Exam:   Appearance: alert, well appearing,  oriented to person, place, and time, acyanotic, in no respiratory distress and well hydrated. HEENT:  NC/AT, pink conj, anicteric sclerae  Neck:  No cervical lymphadenopathy, no JVD, no thyromegaly, no carotid bruit  Heart:  RRR without M/R/G  Lungs:  CTAB, no rhonchi, rales, or wheezes with good air exchange   Abdomen:  Non-tender, pos bowel sounds, no hepatosplenomegaly  Ext:  No C/C/E    Skin: no rash  Neuro: no lateralizing signs, CNs II-XII intact      Assessment/ Plan:   Diagnoses and all orders for this visit:    1. Essential hypertension-BP on the low side    2. Dizziness-likley from low BP; grand daughter who stays with her instructed on how to taper dose of Metoprolol over 2 weeks    3.  Hyperlipidemia, unspecified hyperlipidemia type-on Lipitor    4. CKD (chronic kidney disease) stage 3, GFR 30-59 ml/min ([facility])-stable, continue to avoid OTC NSAIDs    5. Type 2 diabetes mellitus with hyperglycemia, without long-term current use of insulin ([facility])-a1C at goal; currently off meds        Follow-up and Dispositions    · Return in about 1 month (around 2/23/2020) for follow up. Taper off Metoprolol    I have discussed the diagnosis with the patient and the intended plan as seen in the above orders. The patient has received an After-Visit Summary and questions were answered concerning future plans. Medication Side Effects and Warnings were discussed with patient: yes    Patient verbalized understanding of above instructions.     Jong Hamilton MD  Internal Medicine  [facility]

## 2020-02-27 ENCOUNTER — OFFICE VISIT (OUTPATIENT)
Dept: FAMILY MEDICINE CLINIC | Age: 85
End: 2020-02-27

## 2020-02-27 VITALS
DIASTOLIC BLOOD PRESSURE: 66 MMHG | WEIGHT: 154 LBS | BODY MASS INDEX: 29.07 KG/M2 | SYSTOLIC BLOOD PRESSURE: 126 MMHG | TEMPERATURE: 96.4 F | HEART RATE: 77 BPM | HEIGHT: 61 IN | OXYGEN SATURATION: 96 % | RESPIRATION RATE: 15 BRPM

## 2020-02-27 DIAGNOSIS — R42 DIZZINESS: ICD-10-CM

## 2020-02-27 DIAGNOSIS — I10 ESSENTIAL HYPERTENSION: Primary | ICD-10-CM

## 2020-02-27 DIAGNOSIS — I49.9 IRREGULAR HEART BEAT: ICD-10-CM

## 2020-02-27 DIAGNOSIS — I49.3 PVC (PREMATURE VENTRICULAR CONTRACTION): ICD-10-CM

## 2020-02-27 NOTE — PROGRESS NOTES
Chief Complaint   Patient presents with    Follow-up     1 month dizziness, HTN       Pt is a 80y.o. year old female who presents for follow up of her chronic medical problems    BP Readings from Last 3 Encounters:   02/27/20 126/66   01/23/20 109/71   01/06/20 133/70   Metoprolol tapered off  Dizziness? Wt Readings from Last 3 Encounters:   02/27/20 154 lb (69.9 kg)   01/23/20 154 lb (69.9 kg)   01/06/20 155 lb (70.3 kg)       ROS:    Pt denies: Wt loss, Fever/Chills, HA, Visual changes, Fatigue, Chest pain, SOB, SALOMON, Abd pain, N/V/D/C, Blood in stool or urine, Edema. Pertinent positive as above in HPI. All others were negative    Patient Active Problem List   Diagnosis Code    Essential hypertension I10    Osteoporosis without current pathological fracture M81.0    Hyperlipidemia E78.5    Primary osteoarthritis of both knees M17.0    Advanced directives, counseling/discussion Z71.89    Overweight (BMI 25.0-29. 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 90 Tab 2    metoprolol tartrate (LOPRESSOR) 25 mg tablet Take 1 Tab by mouth two (2) times a day. 180 Tab 2    Blood-Glucose Meter (ACCU-CHEK SALLIE PLUS METER) misc accu check sallie plus meter DX: E11.65      acetaminophen (TYLENOL) 325 mg tablet Take  by mouth every four (4) hours as needed for Pain.       FREESTYLE TEST strip PATIENT TESTING BLOOD SUGAR ONCE DAILY 1 Package 10    cetirizine (ZYRTEC) 5 mg tablet Take 1 Tab by mouth daily. 30 Tab 0    FREESTYLE LANCETS 28 gauge misc DAILY USE 1 Package 10       Social History     Tobacco Use   Smoking Status Never Smoker   Smokeless Tobacco Never Used       No Known Allergies    Patient Labs were reviewed: yes      Patient Past Records were reviewed:  yes        Objective:     Vitals:    02/27/20 1000   BP: 126/66   Pulse: 77   Resp: 15   Temp: 96.4 °F (35.8 °C)   TempSrc: Oral   SpO2: 96%   Weight: 154 lb (69.9 kg)   Height: 5' 1\" (1.549 m)     Body mass index is 29.1 kg/m². Exam:   Appearance: alert, well appearing,  oriented to person, place, and time, acyanotic, in no respiratory distress and well hydrated. HEENT:  NC/AT, pink conj, anicteric sclerae  Neck:  No cervical lymphadenopathy, no JVD, no thyromegaly, no carotid bruit  Heart:  Irregular rhythm, normal rate  Lungs:  CTAB, no rhonchi, rales, or wheezes with good air exchange   Abdomen:  Non-tender, pos bowel sounds, no hepatosplenomegaly  Ext:  No C/C/E    Skin: no rash  Neuro: no lateralizing signs, CNs II-XII intact    EKG today showed several PVCs  Assessment/ Plan:   Diagnoses and all orders for this visit:    1. Essential hypertension-BP good off meds  -     AMB POC EKG ROUTINE W/ 12 LEADS, INTER & REP  -     METABOLIC PANEL, COMPREHENSIVE; Future    2. Dizziness-?from the low BP or from arrhythmia  -     AMB POC EKG ROUTINE W/ 12 LEADS, INTER & REP  -     METABOLIC PANEL, COMPREHENSIVE; Future    3. Irregular heart beat-?cause of her dizziness  -     AMB POC EKG ROUTINE W/ 12 LEADS, INTER & REP  -     METABOLIC PANEL, COMPREHENSIVE; Future  -     MAGNESIUM; Future  -     AMB POC EKG 24HR MONITORING    4. PVC (premature ventricular contraction)  -     AMB POC EKG 24HR MONITORING      RTC in 1 month for follow up          I have discussed the diagnosis with the patient and the intended plan as seen in the above orders. The patient has received an After-Visit Summary and questions were answered concerning future plans. Medication Side Effects and Warnings were discussed with patient: yes    Patient verbalized understanding of above instructions.     Adrianna Hendrix MD  Internal Medicine  [facility]

## 2020-02-27 NOTE — PATIENT INSTRUCTIONS
Cardiac Arrhythmia: Care Instructions  Your Care Instructions    A cardiac arrhythmia is a change in the normal rhythm of the heart. Your heart may beat too fast or too slow or beat with an irregular or skipping rhythm. A change in the heart's rhythm may feel like a really strong heartbeat or a fluttering in your chest. A severe heart rhythm problem can keep the body from getting the blood it needs. This can result in shortness of breath, lightheadedness, and fainting. You may take medicine to treat your condition. Your doctor may recommend a pacemaker or recommend catheter ablation to destroy small parts of the heart that are causing a rhythm problem. Another possible treatment is an implantable cardioverter-defibrillator (ICD). An ICD is a device that gives the heart a shock to return the heart to a normal rhythm. Follow-up care is a key part of your treatment and safety. Be sure to make and go to all appointments, and call your doctor if you are having problems. It's also a good idea to know your test results and keep a list of the medicines you take. How can you care for yourself at home?  [facility]    · Be safe with medicines. Take your medicines exactly as prescribed. Call your doctor if you think you are having a problem with your medicine. You will get more details on the specific medicines your doctor prescribes.     · If you received a pacemaker or an ICD, you will get a fact sheet about it.     · Wear medical alert jewelry that says you have an abnormal heart rhythm. You can buy this at most drugstores.    Lifestyle changes    · Eat a heart-healthy diet.     · Stay at a healthy weight. Lose weight if you need to.     · Avoid nicotine, too much alcohol, and illegal drugs (meth, speed, and cocaine). Also, get enough sleep and do not overeat.     · Ask your doctor whether you can take over-the-counter medicines (such as decongestants).  These can make your heart beat fast.     · Talk to your doctor about any limits to activities, such as driving, or tasks where you use power tools or ladders. Activity    · Start light exercise if your doctor says you can. Even a small amount will help you get stronger, have more energy, and manage your stress.     · Get regular exercise. Try for 30 minutes on most days of the week. Ask your doctor what level of exercise is safe for you. If activity is not likely to cause health problems, you probably do not have limits on the type or level of activity that you can do. You may want to walk, swim, bike, or do other activities.     · When you exercise, watch for signs that your heart is working too hard. You are pushing too hard if you cannot talk while you exercise. If you become short of breath or dizzy or have chest pain, sit down and rest.     · Check your pulse daily. Place two fingers on the artery at the palm side of your wrist, in line with your thumb. If your heartbeat seems uneven, talk to your doctor. When should you call for help? Call 911 anytime you think you may need emergency care. For example, call if:    · You have symptoms of sudden heart failure. These may include:  ? Severe trouble breathing. ? A fast or irregular heartbeat. ? Coughing up pink, foamy mucus. ? You passed out.     · You have signs of a stroke. These include:  ? Sudden numbness, paralysis, or weakness in your face, arm, or leg, especially on only one side of your body. ? New problems with walking or balance. ? Sudden vision changes. ? Drooling or slurred speech. ? New problems speaking or understanding simple statements, or feeling confused. ? A sudden, severe headache that is different from past headaches.    Call your doctor now or seek immediate medical care if:    · You have new or changed symptoms of heart failure, such as:  ? New or increased shortness of breath. ? New or worse swelling in your legs, ankles, or feet.   ? Sudden weight gain, such as more than 2 to 3 pounds in a day or 5 pounds in a week. (Your doctor may suggest a different range of weight gain.)  ? Feeling dizzy or lightheaded or like you may faint. ? Feeling so tired or weak that you cannot do your usual activities. ? Not sleeping well. Shortness of breath wakes you at night. You need extra pillows to prop yourself up to breathe easier.    Watch closely for changes in your health, and be sure to contact your doctor if:    · You do not get better as expected. Where can you learn more? Go to http://kosta-angle.info/. Enter X680 in the search box to learn more about \"Cardiac Arrhythmia: Care Instructions. \"  Current as of: April 9, 2019  Content Version: 12.2  © 4817-5899 [employer]. Care instructions adapted under license by [employer] (which disclaims liability or warranty for this information). If you have questions about a medical condition or this instruction, always ask your healthcare professional. [address] any warranty or liability for your use of this information.

## 2020-02-27 NOTE — PROGRESS NOTES
Chief Complaint   Patient presents with    Follow-up     1 month dizziness, HTN         1. Have you been to the ER, urgent care clinic since your last visit? Hospitalized since your last visit? No    2. Have you seen or consulted any other health care providers outside of the [address] [place] [address] since your last visit? Include any pap smears or colon screening.  No

## 2020-02-28 LAB
ALBUMIN SERPL-MCNC: 4.1 G/DL (ref 3.5–4.6)
ALBUMIN/GLOB SERPL: 1.6 {RATIO} (ref 1.2–2.2)
ALP SERPL-CCNC: 95 IU/L (ref 39–117)
ALT SERPL-CCNC: 15 IU/L (ref 0–32)
AST SERPL-CCNC: 16 IU/L (ref 0–40)
BILIRUB SERPL-MCNC: 0.4 MG/DL (ref 0–1.2)
BUN SERPL-MCNC: 12 MG/DL (ref 10–36)
BUN/CREAT SERPL: 11 (ref 12–28)
CALCIUM SERPL-MCNC: 9 MG/DL (ref 8.7–10.3)
CHLORIDE SERPL-SCNC: 109 MMOL/L (ref 96–106)
CO2 SERPL-SCNC: 23 MMOL/L (ref 20–29)
CREAT SERPL-MCNC: 1.08 MG/DL (ref 0.57–1)
GLOBULIN SER CALC-MCNC: 2.6 G/DL (ref 1.5–4.5)
GLUCOSE SERPL-MCNC: 78 MG/DL (ref 65–99)
INTERPRETATION: NORMAL
MAGNESIUM SERPL-MCNC: 2.1 MG/DL (ref 1.6–2.3)
POTASSIUM SERPL-SCNC: 4.4 MMOL/L (ref 3.5–5.2)
PROT SERPL-MCNC: 6.7 G/DL (ref 6–8.5)
SODIUM SERPL-SCNC: 146 MMOL/L (ref 134–144)

## 2020-03-09 ENCOUNTER — TELEPHONE (OUTPATIENT)
Dept: FAMILY MEDICINE CLINIC | Age: 85
End: 2020-03-09

## 2020-03-09 DIAGNOSIS — I49.9 IRREGULAR HEART BEAT: ICD-10-CM

## 2020-03-09 DIAGNOSIS — R42 DIZZINESS: Primary | ICD-10-CM

## 2020-03-09 NOTE — TELEPHONE ENCOUNTER
Pt's daughter is requesting a call back in regards to holter monitor order.   She called [facility] but the order says EKG and it needs to say holter monitor   Please call pt's daughter back

## 2020-03-11 NOTE — TELEPHONE ENCOUNTER
----- Message from Tabitha Wilcox MD sent at 3/11/2020  1:00 AM EDT -----  pls let patient know potassium and magnesium levels were normal

## 2020-03-18 ENCOUNTER — HOSPITAL ENCOUNTER (OUTPATIENT)
Dept: NON INVASIVE DIAGNOSTICS | Age: 85
Discharge: HOME | End: 2020-03-18
Attending: INTERNAL MEDICINE
Payer: MEDICARE

## 2020-03-18 DIAGNOSIS — I49.9 IRREGULAR HEART BEAT: ICD-10-CM

## 2020-03-18 DIAGNOSIS — R42 DIZZINESS: ICD-10-CM

## 2020-03-18 PROCEDURE — 93226 XTRNL ECG REC<48 HR SCAN A/R: CPT

## 2020-03-26 NOTE — PROGRESS NOTES
Sameer, can I send her to see you?  Or do you recommend me to start her on a medication for the SVT  and see you at a later time with her age and us trying to reschedule patients  Thanks again for your input

## 2020-03-30 ENCOUNTER — OFFICE VISIT (OUTPATIENT)
Dept: FAMILY MEDICINE CLINIC | Age: 85
End: 2020-03-30

## 2020-03-30 DIAGNOSIS — E78.5 HYPERLIPIDEMIA, UNSPECIFIED HYPERLIPIDEMIA TYPE: ICD-10-CM

## 2020-03-30 DIAGNOSIS — I10 ESSENTIAL HYPERTENSION: ICD-10-CM

## 2020-03-30 DIAGNOSIS — I47.1 PAROXYSMAL SVT (SUPRAVENTRICULAR TACHYCARDIA) (HCC): ICD-10-CM

## 2020-03-30 DIAGNOSIS — R42 DIZZINESS: Primary | ICD-10-CM

## 2020-03-30 NOTE — PATIENT INSTRUCTIONS
Supraventricular Tachycardia: Care Instructions  Your Care Instructions    Having supraventricular tachycardia (SVT) means that from time to time your heart beats abnormally fast. This fast rhythm is caused by changes in the electrical system of your heart. You may feel a fluttering in your chest (palpitations) and have a fast pulse. When your heart is beating fast, you may feel anxious and lightheaded, be short of breath, and feel discomfort in the chest.  Your doctor may prescribe medicines to help slow down your heartbeat. Your doctor may also suggest you try vagal maneuvers when having an episode of SVT. These are things, like bearing down, that might help slow your heart rate. Bearing down means that you try to breathe out with your stomach muscles but you don't let air out of your nose or mouth. Your doctor can show you how to do vagal maneuvers. He or she may suggest you lie down on your back to do them. In some cases, either cardioversion treatment or a procedure called catheter ablation is done to correct SVT. Your doctor may ask you to wear a small electronic device for 1 or 2 days to monitor your heart. It is called a Holter monitor. Follow-up care is a key part of your treatment and safety. Be sure to make and go to all appointments, and call your doctor if you are having problems. It's also a good idea to know your test results and keep a list of the medicines you take. How can you care for yourself at home? · Be safe with medicines. Take your medicines exactly as prescribed. Call your doctor if you think you are having a problem with your medicine. You will get more details on the specific medicines your doctor prescribes. · If your doctor showed you how to do vagal maneuvers, try them when you have an episode. These maneuvers include bearing down or putting an ice-cold, wet towel on your face. · Monitor your condition by keeping a diary of your SVT episodes.  Bring this to your doctor appointments. ? Write down how fast or slow your heart was beating. To count your heart rate:  § Gently place 2 fingers of your hand on the inside of your other wrist, below your thumb. § Count the beats for 30 seconds. § Then, double the result to get the number of beats per minute. ? Write down if your heart rhythm was regular or irregular. ? Write down the symptoms you had.  ? Write down the time of day your symptoms occurred. ? Write down how long your symptoms lasted. ? Write down what you were doing when your symptoms started. ? Write down what may have helped your symptoms go away. · If they trigger episodes, limit or avoid alcohol or drinks with caffeine. · Do not use over-the-counter decongestants, herbal remedies, diet pills, or \"pep\" pills, which often contain stimulants. · Do not use illegal drugs, such as cocaine, ecstasy, or methamphetamine, which can speed up your heart's rhythm. · Do not smoke. Smoking can make this condition worse. If you need help quitting, talk to your doctor about stop-smoking programs and medicines. These can increase your chances of quitting for good. · Be alert for new or worsening symptoms, such as shortness of breath, pounding of your heart, or unusual tiredness. If new symptoms develop or your symptoms become worse, call your doctor. When should you call for help? Call 911 anytime you think you may need emergency care. For example, call if:    · You passed out (lost consciousness).     · You are short of breath.    Call your doctor now or seek immediate medical care if:    · You have a fast heartbeat.     · You are dizzy or lightheaded, or feel like you may faint.    Watch closely for changes in your health, and be sure to contact your doctor if:    · You do not get better as expected. Where can you learn more?   Go to http://kosta-angle.info/  Enter G244 in the search box to learn more about \"Supraventricular Tachycardia: Care Instructions. \"  Current as of: December 15, 2019Content Version: 12.4  © 2478-5630 [employer]. Care instructions adapted under license by [employer] (which disclaims liability or warranty for this information). If you have questions about a medical condition or this instruction, always ask your healthcare professional. [place] [address] any warranty or liability for your use of this information.

## 2020-03-30 NOTE — PROGRESS NOTES
Sarita Clemons is a [address] y.o. female who was seen by synchronous (real-time) audio-video technology on 3/30/2020. Consent:  She and/or her healthcare decision maker is aware that this patient-initiated Telehealth encounter is a billable service, with coverage as determined by her insurance carrier. She is aware that she may receive a bill and has provided verbal consent to proceed: Yes    I was in the office while conducting this encounter. Assessment & Plan:   Diagnoses and all orders for this visit:    1. Dizziness-?from episodes of SVT  -     REFERRAL TO CARDIOLOGY    2. Essential hypertension-will accept SBP in the 140-150 with her age    1. Paroxysmal SVT (supraventricular tachycardia) (HCC)-continue with beta blocker until she sees Cardio  -     REFERRAL TO CARDIOLOGY    4. Hyperlipidemia, unspecified hyperlipidemia type-continue with Lipitor      Follow-up and Dispositions    · Return in about 3 months (around 6/30/2020) for follow up. 712  Subjective: Sarita Clemons was seen for Follow-up (HTN, Holter results)    Seen with her daughter who usually brings her in  They have been monitoring her BP and HR at home:   Today BPs and HR were:  141/86  SC 59    134/80  54    Meds reviewed: Has been taking the Metoprolol BID and Lipitor    Discussed with them the results of the holter-SVT noted but patient did not report of any symptoms  Also discussed that last labs did not show any abnormality in K and Mg levels    Apparently patient has not had any dizziness episodes since I saw her    Denies CP or SOB    Prior to Admission medications    Medication Sig Start Date End Date Taking? Authorizing Provider   cetirizine (ZYRTEC) 5 mg tablet Take 1 Tab by mouth daily. 1/6/20  Yes Israel Richards NP   atorvastatin (LIPITOR) 10 mg tablet Take 1 Tab by mouth daily. 10/5/19  Yes Marcie Alberto MD   metoprolol tartrate (LOPRESSOR) 25 mg tablet Take 1 Tab by mouth two (2) times a day.  7/17/19  Yes Pierre Castro MD   Blood-Glucose Meter (ACCU-CHEK SALLIE PLUS METER) misc accu check sallie plus meter DX: E11.65 1/4/17  Yes Provider, Historical   acetaminophen (TYLENOL) 325 mg tablet Take  by mouth every four (4) hours as needed for Pain. Yes Provider, Historical   FREESTYLE TEST strip PATIENT TESTING BLOOD SUGAR ONCE DAILY 12/2/14  Yes Kedar West MD   FREESTYLE LANCETS 28 gauge [facility] DAILY USE 12/2/14  Yes Kedar West MD     No Known Allergies      ROS as in HPI: the rest are negative    PHYSICAL EXAMINATION:  [ INSTRUCTIONS:  \"[x]\" Indicates a positive item  \"[]\" Indicates a negative item  -- DELETE ALL ITEMS NOT EXAMINED]  Vital Signs: (As obtained by patient/caregiver at home)  There were no vitals taken for this visit.      Constitutional: [x] Appears well-developed and well-nourished [x] No apparent distress      [] Abnormal -    Mental status: [x] Alert and awake  [x] Oriented to person/place/time [x] Able to follow commands    [] Abnormal -     Eyes:   EOM    [x]  Normal    [] Abnormal -   Sclera  [x]  Normal    [] Abnormal -          Discharge [x]  None visible   [] Abnormal -    HENT: [x] Normocephalic, atraumatic  [] Abnormal -   [x] Mouth/Throat: Mucous membranes are moist    External Ears [x] Normal  [] Abnormal -    Neck: [x] No visualized mass [] Abnormal -     Pulmonary/Chest: [x] Respiratory effort normal   [x] No visualized signs of difficulty breathing or respiratory distress        [] Abnormal -      Musculoskeletal:   [x] Normal gait with no signs of ataxia         [x] Normal range of motion of neck        [] Abnormal -     Neurological:        [x] No Facial Asymmetry (Cranial nerve 7 motor function) (limited exam due to video visit)          [x] No gaze palsy        [] Abnormal -        Skin:        [x] No significant exanthematous lesions or discoloration noted on facial skin         [] Abnormal            Psychiatric:       [x] Normal Affect [] Abnormal       [x] No Hallucinations    Other pertinent observable physical exam findings:Patient appears upbeat/happy        We discussed the expected course, resolution and complications of the diagnosis(es) in detail. Medication risks, benefits, costs, interactions, and alternatives were discussed as indicated. I advised her to contact the office if her condition worsens, changes or fails to improve as anticipated. She expressed understanding with the diagnosis(es) and plan. Pursuant to the emergency declaration under the [address] [place] [address], [facility] waiver authority and the [employer] Act, this Virtual  Visit was conducted, with patient's consent, to reduce the patient's risk of exposure to COVID-19 and provide continuity of care for an established patient. Services were provided through a video synchronous discussion virtually to substitute for in-person clinic visit.     Jv Torres MD

## 2020-07-08 ENCOUNTER — TELEPHONE (OUTPATIENT)
Dept: FAMILY MEDICINE CLINIC | Age: 85
End: 2020-07-08

## 2020-07-13 ENCOUNTER — TELEPHONE (OUTPATIENT)
Dept: FAMILY MEDICINE CLINIC | Age: 85
End: 2020-07-13

## 2020-07-13 ENCOUNTER — VIRTUAL VISIT (OUTPATIENT)
Dept: FAMILY MEDICINE CLINIC | Age: 85
End: 2020-07-13

## 2020-07-13 DIAGNOSIS — E78.5 HYPERLIPIDEMIA, UNSPECIFIED HYPERLIPIDEMIA TYPE: ICD-10-CM

## 2020-07-13 RX ORDER — ATORVASTATIN CALCIUM 10 MG/1
10 TABLET, FILM COATED ORAL DAILY
Qty: 90 TAB | Refills: 1 | Status: CANCELLED | OUTPATIENT
Start: 2020-07-13

## 2020-07-13 NOTE — PROGRESS NOTES
Chief Complaint   Patient presents with    Follow Up Chronic Condition     3 month; HTN, Cholesterol       1. Have you been to the ER, urgent care clinic since your last visit? Hospitalized since your last visit? No    2. Have you seen or consulted any other health care providers outside of the [address] [place] [address] since your last visit? Include any pap smears or colon screening.  No

## 2020-07-13 NOTE — TELEPHONE ENCOUNTER
Attempted to contact patient twice and left voicemail for patient to return call to start appointment.

## 2020-07-13 NOTE — TELEPHONE ENCOUNTER
Left voicemail for patient granddaughter notifying that office is trying to reach patient for appointment.

## 2020-07-16 ENCOUNTER — VIRTUAL VISIT (OUTPATIENT)
Dept: FAMILY MEDICINE CLINIC | Age: 85
End: 2020-07-16

## 2020-07-16 DIAGNOSIS — I10 ESSENTIAL HYPERTENSION: ICD-10-CM

## 2020-07-16 DIAGNOSIS — E78.5 HYPERLIPIDEMIA, UNSPECIFIED HYPERLIPIDEMIA TYPE: ICD-10-CM

## 2020-07-16 DIAGNOSIS — I47.1 PAROXYSMAL SVT (SUPRAVENTRICULAR TACHYCARDIA) (HCC): Primary | ICD-10-CM

## 2020-07-16 NOTE — PATIENT INSTRUCTIONS
Supraventricular Tachycardia: Care Instructions  Your Care Instructions     Having supraventricular tachycardia (SVT) means that from time to time your heart beats abnormally fast. This fast rhythm is caused by changes in the electrical system of your heart. You may feel a fluttering in your chest (palpitations) and have a fast pulse. When your heart is beating fast, you may feel anxious and lightheaded, be short of breath, and feel discomfort in the chest.  Your doctor may prescribe medicines to help slow down your heartbeat. Your doctor may also suggest you try vagal maneuvers when having an episode of SVT. These are things, like bearing down, that might help slow your heart rate. Bearing down means that you try to breathe out with your stomach muscles but you don't let air out of your nose or mouth. Your doctor can show you how to do vagal maneuvers. He or she may suggest you lie down on your back to do them. In some cases, either cardioversion treatment or a procedure called catheter ablation is done to correct SVT. Your doctor may ask you to wear a small electronic device for 1 or 2 days to monitor your heart. It is called a Holter monitor. Follow-up care is a key part of your treatment and safety. Be sure to make and go to all appointments, and call your doctor if you are having problems. It's also a good idea to know your test results and keep a list of the medicines you take. How can you care for yourself at home? · Be safe with medicines. Take your medicines exactly as prescribed. Call your doctor if you think you are having a problem with your medicine. You will get more details on the specific medicines your doctor prescribes. · If your doctor showed you how to do vagal maneuvers, try them when you have an episode. These maneuvers include bearing down or putting an ice-cold, wet towel on your face. · Monitor your condition by keeping a diary of your SVT episodes.  Bring this to your doctor appointments. ? Write down how fast or slow your heart was beating. To count your heart rate:  § Gently place 2 fingers of your hand on the inside of your other wrist, below your thumb. § Count the beats for 30 seconds. § Then, double the result to get the number of beats per minute. ? Write down if your heart rhythm was regular or irregular. ? Write down the symptoms you had.  ? Write down the time of day your symptoms occurred. ? Write down how long your symptoms lasted. ? Write down what you were doing when your symptoms started. ? Write down what may have helped your symptoms go away. · If they trigger episodes, limit or avoid alcohol or drinks with caffeine. · Do not use over-the-counter decongestants, herbal remedies, diet pills, or \"pep\" pills, which often contain stimulants. · Do not use illegal drugs, such as cocaine, ecstasy, or methamphetamine, which can speed up your heart's rhythm. · Do not smoke. Smoking can make this condition worse. If you need help quitting, talk to your doctor about stop-smoking programs and medicines. These can increase your chances of quitting for good. · Be alert for new or worsening symptoms, such as shortness of breath, pounding of your heart, or unusual tiredness. If new symptoms develop or your symptoms become worse, call your doctor. When should you call for help? [id number] anytime you think you may need emergency care. For example, call if:  · You passed out (lost consciousness). · You are short of breath. Call your doctor now or seek immediate medical care if:  · You have a fast heartbeat. · You are dizzy or lightheaded, or feel like you may faint. Watch closely for changes in your health, and be sure to contact your doctor if:  · You do not get better as expected. Where can you learn more? Go to http://kosta-angle.info/  Enter G244 in the search box to learn more about \"Supraventricular Tachycardia: Care Instructions. \"  Current as of: December 16, 2019               Content Version: 12.5  © 1267-3225 Healthwise, Incorporated. Care instructions adapted under license by [employer] (which disclaims liability or warranty for this information). If you have questions about a medical condition or this instruction, always ask your healthcare professional. [address] any warranty or liability for your use of this information.

## 2020-07-16 NOTE — PROGRESS NOTES
James Conte is a 80 y.o. female who was seen by synchronous (real-time) audio-video technology on 7/16/2020 for Follow Up Chronic Condition (HTN, DM)        Assessment & Plan:   Diagnoses and all orders for this visit:    1. Paroxysmal SVT (supraventricular tachycardia) (HCC)-will facilitate her getting to Cardio soon    2. Essential hypertension-continue with present meds and home monitoring    3. Hyperlipidemia, unspecified hyperlipidemia type-at goal on Lipitor      Follow-up and Dispositions    · Return in about 3 months (around 10/16/2020) for follow up, fasting labs. 712  Subjective:     Health Maintenance Due   Topic Date Due    DTaP/Tdap/Td series (1 - Tdap) 12/30/1950    Shingrix Vaccine Age 49> (1 of 2) 12/30/1979     No further episodes of dizziness  Has not seen Cardio  No palpitations    Lab Results   Component Value Date/Time    Cholesterol, total 152 10/23/2019 10:38 AM    HDL Cholesterol 44 10/23/2019 10:38 AM    LDL, calculated 85 10/23/2019 10:38 AM    VLDL, calculated 23 10/23/2019 10:38 AM    Triglyceride 115 10/23/2019 10:38 AM    CHOL/HDL Ratio 3.8 09/23/2010 01:08 PM     Prior to Admission medications    Medication Sig Start Date End Date Taking? Authorizing Provider   metoprolol tartrate (LOPRESSOR) 25 mg tablet Take 1 Tab by mouth two (2) times a day. 6/12/20  Yes Marcie Alberto MD   atorvastatin (LIPITOR) 10 mg tablet Take 1 Tab by mouth daily. 6/12/20  Yes Marcie Alberto MD   cetirizine (ZYRTEC) 5 mg tablet Take 1 Tab by mouth daily. 1/6/20  Yes Gadiel Jacobo NP   acetaminophen (TYLENOL) 325 mg tablet Take  by mouth every four (4) hours as needed for Pain.    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    Type 2 diabetes with nephropathy ([facility] 09/26/2018    CKD (chronic kidney disease) stage 3, GFR 30-59 ml/min ([facility] 05/15/2018    Type 2 diabetes mellitus with hyperglycemia, without long-term current use of insulin ([facility] 02/26/2018    Overweight (BMI 25.0-29.9) 01/09/2018    Advanced directives, counseling/discussion 10/10/2017    Essential hypertension 05/25/2017    Osteoporosis without current pathological fracture 05/25/2017    Hyperlipidemia 05/25/2017    Primary osteoarthritis of both knees 05/25/2017       ROS  Pt denies: Wt loss, Fever/Chills, HA, Visual changes, Fatigue, Chest pain, SOB, SALOMON, Abd pain, N/V/D/C, Blood in stool or urine, Edema. Pertinent positive as above in HPI. All others were negative  Objective:     Patient-Reported Vitals 7/16/2020   Patient-Reported Weight 159lb   Patient-Reported Pulse 69   Patient-Reported Systolic  430   Patient-Reported Diastolic 88      General: alert, cooperative, no distress   Mental  status: normal mood, behavior, speech, dress, motor activity, and thought processes, able to follow commands   HENT: NCAT   Neck: no visualized mass   Resp: no respiratory distress   Neuro: no gross deficits   Skin: no discoloration or lesions of concern on visible areas   Psychiatric: normal affect, consistent with stated mood, no evidence of hallucinations     Additional exam findings: none      We discussed the expected course, resolution and complications of the diagnosis(es) in detail. Medication risks, benefits, costs, interactions, and alternatives were discussed as indicated. I advised her to contact the office if her condition worsens, changes or fails to improve as anticipated. She expressed understanding with the diagnosis(es) and plan.        Ashvin Torres, who was evaluated through a patient-initiated, synchronous (real-time) audio-video encounter, and/or her healthcare decision maker, is aware that it is a billable service, with coverage as determined by her insurance carrier. She provided verbal consent to proceed: Yes, and patient identification was verified. It was conducted pursuant to the emergency declaration under the [address] [place] [address], [address] [place] [address] authority and the [employer] [place] [employer] Act. A caregiver was present when appropriate. Ability to conduct physical exam was limited. I was in the office. The patient was at home.       Anam Lau MD

## 2020-07-16 NOTE — PROGRESS NOTES
Chief Complaint   Patient presents with    Follow Up Chronic Condition     HTN, DM     1. Have you been to the ER, urgent care clinic since your last visit? Hospitalized since your last visit? No    2. Have you seen or consulted any other health care providers outside of the [address] [place] [address] since your last visit? Include any pap smears or colon screening.  No

## 2020-08-19 ENCOUNTER — OFFICE VISIT (OUTPATIENT)
Dept: CARDIOLOGY CLINIC | Age: 85
End: 2020-08-19

## 2020-08-19 VITALS
BODY MASS INDEX: 30.02 KG/M2 | SYSTOLIC BLOOD PRESSURE: 130 MMHG | TEMPERATURE: 97.9 F | HEIGHT: 61 IN | HEART RATE: 61 BPM | WEIGHT: 159 LBS | DIASTOLIC BLOOD PRESSURE: 73 MMHG | OXYGEN SATURATION: 98 %

## 2020-08-19 DIAGNOSIS — R42 DIZZINESS: ICD-10-CM

## 2020-08-19 DIAGNOSIS — I49.9 IRREGULAR HEART BEAT: Primary | ICD-10-CM

## 2020-08-19 DIAGNOSIS — R00.2 PALPITATIONS: ICD-10-CM

## 2020-08-19 RX ORDER — ASPIRIN 81 MG/1
TABLET ORAL DAILY
COMMUNITY
End: 2021-11-09

## 2020-08-19 NOTE — PROGRESS NOTES
[facility]    Sarah García is a 59-year-old female with diabetes, essential hypertension, hyperlipidemia    Patient was asked to come see me for the cardiology evaluation. She denies any prior history microinfarction or congestive heart failure. She is accompanied with the daughter. Patient underwent Holter monitor which was ordered by PCP as result of abnormal auscultation during routine exam.  Holter monitor showed some paroxysmal SVT. Patient and daughter denies any cardiac symptoms that is concerning for unstable coronary symptoms or decompensated heart failure. She does not have any presyncope or syncope. She does not have any feeling of palpitation or skipped beats. She does have minimal dyspnea sometimes with moderate exertional activity. She denies PND or lower extremity swelling  Denies any nausea, vomiting, abdominal pain, fever, chills, sputum production. No hematuria or other bleeding complaints    Past Medical History:   Diagnosis Date    Diabetes ([facility]     Essential hypertension 5/25/2017    Hyperlipidemia 5/25/2017    [facility] 9/18/2018         Past Surgical History:   Procedure Laterality Date    HX HYSTERECTOMY         Current Outpatient Medications   Medication Sig    aspirin delayed-release 81 mg tablet Take  by mouth daily.  metoprolol tartrate (LOPRESSOR) 25 mg tablet Take 1 Tab by mouth two (2) times a day.  atorvastatin (LIPITOR) 10 mg tablet Take 1 Tab by mouth daily.  cetirizine (ZYRTEC) 5 mg tablet Take 1 Tab by mouth daily.  Blood-Glucose Meter (ACCU-CHEK SALLIE PLUS METER) misc accu check sallie plus meter DX: E11.65    acetaminophen (TYLENOL) 325 mg tablet Take  by mouth every four (4) hours as needed for Pain.     FREESTYLE TEST strip PATIENT TESTING BLOOD SUGAR ONCE DAILY    FREESTYLE LANCETS 28 gauge [facility] DAILY USE     No current facility-administered medications for this visit. Allergies and Sensitivities:  No Known Allergies    Family History:  Family History   Problem Relation Age of Onset    Kidney Disease Mother     Hypertension Sister     Kidney Disease Brother        Social History:  Social History     Tobacco Use    Smoking status: Never Smoker    Smokeless tobacco: Never Used   Substance Use Topics    Alcohol use: No    Drug use: No     She  reports that she has never smoked. She has never used smokeless tobacco.  She  reports no history of alcohol use. Review of Systems:  Cardiac symptoms as noted above in HPI. All others negative. Denies fatigue, malaise, skin rash, joint pain, blurring vision, photophobia, neck pain, hemoptysis, chronic cough, nausea, vomiting, hematuria, burning micturition, BRBPR, chronic headaches. Physical Exam:  BP Readings from Last 3 Encounters:   08/19/20 130/73   02/27/20 126/66   01/23/20 109/71         Pulse Readings from Last 3 Encounters:   08/19/20 61   02/27/20 77   01/23/20 60          Wt Readings from Last 3 Encounters:   08/19/20 159 lb (72.1 kg)   02/27/20 154 lb (69.9 kg)   01/23/20 154 lb (69.9 kg)       Constitutional: Oriented to person, place, and time. HENT: Head: Normocephalic and atraumatic. Neck: No JVD present. Carotid bruit is not appreciated. Cardiovascular: Regular rhythm. No murmur, gallop or rubs appreciated  Lung: Breath sounds normal. No respiratory distress. No ronchi or rales appreciated  Abdominal: No tenderness. No rebound and no guarding. Musculoskeletal: There is no lower extremity edema. No cynosis  Lymphadenopathy:  No cervical or supraclavicular adenopathy appriciated. Neurological: No gross motor deficit noted. Skin: No visible skin rash noted. No Ear discharge noted  Psychiatric: Normal mood and affect.    Good distal pulse    Review of Data  LABS:   Lab Results   Component Value Date/Time    Sodium 146 (H) 02/27/2020 11:32 AM    Potassium 4.4 02/27/2020 11:32 AM Chloride 109 (H) 02/27/2020 11:32 AM    CO2 23 02/27/2020 11:32 AM    Glucose 78 02/27/2020 11:32 AM    BUN 12 02/27/2020 11:32 AM    Creatinine 1.08 (H) 02/27/2020 11:32 AM     Lipids Latest Ref Rng & Units 10/23/2019 1/7/2019 2/26/2018 5/25/2017   Chol, Total 100 - 199 mg/dL 152 162 136 149   HDL >39 mg/dL 44 52 53 64   LDL 0 - 99 mg/dL 85 91 67 70   Trig 0 - 149 mg/dL 115 97 78 73   Some recent data might be hidden     Lab Results   Component Value Date/Time    ALT (SGPT) 15 02/27/2020 11:32 AM     Lab Results   Component Value Date/Time    Hemoglobin A1c 6.1 (H) 05/25/2017 10:47 AM    Hemoglobin A1c (POC) 5.6 10/23/2019 09:51 AM       EKG    HOLTER (2010)  Lucero Vuong was in Sinus Rhythm. The average heart rate, excluding ectopy, was 56 BPM with a minimum of 39 BPM at 01:20 D2 and a maximum of 80 BPM at 09:19 D2. Heart beats, including ectopy, totaled 503066 beats. VENTRICULAR ECTOPICS totaled 71 averaging 1.5 per hour with 71 single, 0 paired, 0 trigeminy and 0 R on T.  SUPRAVENTRICULAR ECTOPICS totaled 83352 averaging 368. 2 per hour ,with 6203 single and 8976 paired beats. Narrow complex tachycardia occurred 160 times. The fastest run was at 143 BPM and occurred at 08:48 D3 with 508 beats. The longest run was 593 beats at 10:13 D2 at a rate of 142 BPM.  PAUSES occurred 24 times, the longest of which was 2.3 seconds at 22:57:05 D1  Patient diary submitted, no symptoms noted. No patient triggered events noted. ECHO (2018)  LEFT VENTRICULAR SIZE, THICKNESS AND SYSTOLIC FUNCTION ARE NORMAL, NO WALL MOTION   ABNORMALITIES. NORMAL DIASTOLIC FUNCTION. CALCULATED EJECTION FRACTION BY MARTINEZ'S BI-PLANE METHOD IS 72%. THICKENING/CALCIFICATION OF   THE MITRAL VALVE LEAFLETS. LOCAL THICKENING OF THE NON CORONARY AORTIC VALVE CUSPS. MILD TRICUSPID REGURGITATION. NORMAL PULMONARY ARTERY PRESSURE OF 19.6 MMHG. ASCENDING AORTA DILATATION 3.6 CM. STRESS TEST (2018)  COMBINED INTERPRETATION:  1.  No evidence for reversible myocardial ischemia or infarction. 2. Gated SPECT left ventricular ejection fraction is 64%. CATHETERIZATION    IMPRESSION & PLAN:  Kit Hayes is 43-year-old female with multiple medical problem    Hypertension:  Currently she is on Lopressor 25 mg twice daily. Blood pressure stable. Continue current medication    Hyperlipidemia:  Currently she is on atorvastatin. Last LDL 85. Continue current medication no side effect    Paroxysmal SVT:  Diagnosed on Holter monitor which was performed in March 2020. Patient is asymptomatic  Patient already on beta-blocker since 2019. Tolerating medication well  As patient has no presyncope and syncope, I recommend that she continues with beta-blocker  We will order echo to make sure she does not have any structural abnormality of the heart    This plan was discussed with patient who is in agreement. Thank you for allowing me to participate in patient care. Please feel free to call me if you have any question or concern. Idania Courtney MD  Please note: This document has been produced using voice recognition software. Unrecognized errors in transcription may be present.

## 2020-08-19 NOTE — PROGRESS NOTES
Isabela Larson presents today for   Chief Complaint   Patient presents with    New Patient       Isabela Larson preferred language for health care discussion is english/other. Is someone accompanying this pt? Yes daughter    Is the patient using any DME equipment during OV? no    Depression Screening:  3 most recent PHQ Screens 8/19/2020   Little interest or pleasure in doing things Not at all   Feeling down, depressed, irritable, or hopeless Not at all   Total Score PHQ 2 0       Learning Assessment:  Learning Assessment 5/25/2017   PRIMARY LEARNER Child(roger)   HIGHEST LEVEL OF EDUCATION - PRIMARY LEARNER  2 YEARS OF COLLEGE   BARRIERS PRIMARY LEARNER NONE   CO-LEARNER CAREGIVER Yes   PRIMARY LANGUAGE ENGLISH    NEED -   LEARNER PREFERENCE PRIMARY DEMONSTRATION   LEARNING SPECIAL TOPICS -   ANSWERED BY trishaLoraine Glaser   RELATIONSHIP OTHER       Abuse Screening:  Abuse Screening Questionnaire 1/6/2020   Do you ever feel afraid of your partner? N   Are you in a relationship with someone who physically or mentally threatens you? N   Is it safe for you to go home? Y       Fall Risk  Fall Risk Assessment, last 12 mths 8/19/2020   Able to walk? Yes   Fall in past 12 months? No   Fall with injury? -   Number of falls in past 12 months -   Fall Risk Score -       Pt currently taking Anticoagulant therapy? no    Coordination of Care:  1. Have you been to the ER, urgent care clinic since your last visit? Hospitalized since your last visit? No   2. Have you seen or consulted any other health care providers outside of the [address] [place] [address] since your last visit? Include any pap smears or colon screening.  no

## 2020-08-19 NOTE — PATIENT INSTRUCTIONS
Testing Echocardiogram 
 
If you don't hear from scheduling in 24 hrs, Please call [facility] central scheduling at [phone]  to schedule an appointment. All testing is completed at [address] [place] [address], [facility] **call office three days after testing for results**

## 2020-08-31 ENCOUNTER — HOSPITAL ENCOUNTER (OUTPATIENT)
Dept: NON INVASIVE DIAGNOSTICS | Age: 85
Discharge: HOME | End: 2020-08-31
Attending: INTERNAL MEDICINE
Payer: MEDICARE

## 2020-08-31 VITALS
BODY MASS INDEX: 30.02 KG/M2 | DIASTOLIC BLOOD PRESSURE: 73 MMHG | WEIGHT: 159 LBS | HEIGHT: 61 IN | SYSTOLIC BLOOD PRESSURE: 130 MMHG

## 2020-08-31 DIAGNOSIS — R42 DIZZINESS: ICD-10-CM

## 2020-08-31 DIAGNOSIS — I49.9 IRREGULAR HEART BEAT: ICD-10-CM

## 2020-08-31 DIAGNOSIS — R00.2 PALPITATIONS: ICD-10-CM

## 2020-08-31 LAB
ECHO AO ROOT DIAM: 3.2 CM
ECHO LA AREA 2C: 19.9 CM2
ECHO LA AREA 4C: 18.3 CM2
ECHO LA MAJOR AXIS: 3.28 CM
ECHO LA MINOR AXIS: 1.91 CM
ECHO LA TO AORTIC ROOT RATIO: 1.03
ECHO LA VOL 2C: 52.34 ML (ref 22–52)
ECHO LA VOL 4C: 50.61 ML (ref 22–52)
ECHO LA VOL BP: 59.14 ML (ref 22–52)
ECHO LA VOL/BSA BIPLANE: 34.51 ML/M2 (ref 16–28)
ECHO LA VOLUME INDEX A2C: 30.54 ML/M2 (ref 16–28)
ECHO LA VOLUME INDEX A4C: 29.53 ML/M2 (ref 16–28)
ECHO LV E' LATERAL VELOCITY: 7.1 CM/S
ECHO LV E' SEPTAL VELOCITY: 4.68 CM/S
ECHO LV EDV A2C: 62.9 ML
ECHO LV EDV A4C: 80.3 ML
ECHO LV EDV BP: 71.9 ML (ref 56–104)
ECHO LV EDV INDEX A4C: 46.9 ML/M2
ECHO LV EDV INDEX BP: 42 ML/M2
ECHO LV EDV NDEX A2C: 36.7 ML/M2
ECHO LV EDV TEICHHOLZ: 24.66 ML
ECHO LV EJECTION FRACTION A2C: 40 %
ECHO LV EJECTION FRACTION A4C: 46 %
ECHO LV EJECTION FRACTION BIPLANE: 43.3 % (ref 55–100)
ECHO LV ESV A2C: 37.6 ML
ECHO LV ESV A4C: 43.3 ML
ECHO LV ESV BP: 40.8 ML (ref 19–49)
ECHO LV ESV INDEX A2C: 21.9 ML/M2
ECHO LV ESV INDEX A4C: 25.3 ML/M2
ECHO LV ESV INDEX BP: 23.8 ML/M2
ECHO LV ESV TEICHHOLZ: 10.8 ML
ECHO LV INTERNAL DIMENSION DIASTOLIC: 3.28 CM (ref 3.9–5.3)
ECHO LV INTERNAL DIMENSION SYSTOLIC: 2.35 CM
ECHO LV IVSD: 1.41 CM (ref 0.6–0.9)
ECHO LV MASS 2D: 141.2 G (ref 67–162)
ECHO LV MASS INDEX 2D: 82.4 G/M2 (ref 43–95)
ECHO LV POSTERIOR WALL DIASTOLIC: 1.2 CM (ref 0.6–0.9)
ECHO LVOT DIAM: 1.83 CM
ECHO LVOT PEAK GRADIENT: 3.2 MMHG
ECHO LVOT SV: 51.9 ML
ECHO LVOT VTI: 19.7 CM
ECHO MV A VELOCITY: 89.42 CM/S
ECHO MV E DECELERATION TIME (DT): 236 MS
ECHO MV E VELOCITY: 71.51 CM/S
ECHO MV E/A RATIO: 0.8
ECHO MV E/E' LATERAL: 10.07
ECHO MV E/E' RATIO (AVERAGED): 12.68
ECHO MV E/E' SEPTAL: 15.28
ECHO RA MINOR AXIS: 3.23 CM
ECHO RV TAPSE: 1.03 CM (ref 1.5–2)
ECHO TV REGURGITANT MAX VELOCITY: 267.45 CM/S
ECHO TV REGURGITANT PEAK GRADIENT: 28.6 MMHG
LVFS 2D: 28.48 %
LVOT MG: 1.47 MMHG
LVOT MV: 0.57 CM/S
LVSV (MOD BI): 17.67 ML
LVSV (MOD SINGLE 4C): 20.98 ML
LVSV (MOD SINGLE): 14.33 ML
LVSV (TEICH): 13.87 ML
MV DEC SLOPE: 3.03

## 2020-08-31 PROCEDURE — 93306 TTE W/DOPPLER COMPLETE: CPT

## 2020-10-21 ENCOUNTER — VIRTUAL VISIT (OUTPATIENT)
Dept: FAMILY MEDICINE CLINIC | Age: 85
End: 2020-10-21
Payer: MEDICARE

## 2020-10-21 DIAGNOSIS — Z00.00 MEDICARE ANNUAL WELLNESS VISIT, SUBSEQUENT: Primary | ICD-10-CM

## 2020-10-21 DIAGNOSIS — E78.5 HYPERLIPIDEMIA, UNSPECIFIED HYPERLIPIDEMIA TYPE: ICD-10-CM

## 2020-10-21 DIAGNOSIS — Z71.89 ADVANCED DIRECTIVES, COUNSELING/DISCUSSION: ICD-10-CM

## 2020-10-21 DIAGNOSIS — I10 ESSENTIAL HYPERTENSION: ICD-10-CM

## 2020-10-21 DIAGNOSIS — K11.8 MASS OF PAROTID GLAND: ICD-10-CM

## 2020-10-21 DIAGNOSIS — E11.65 TYPE 2 DIABETES MELLITUS WITH HYPERGLYCEMIA, WITHOUT LONG-TERM CURRENT USE OF INSULIN (HCC): ICD-10-CM

## 2020-10-21 DIAGNOSIS — N18.31 STAGE 3A CHRONIC KIDNEY DISEASE (HCC): ICD-10-CM

## 2020-10-21 DIAGNOSIS — I47.1 PAROXYSMAL SVT (SUPRAVENTRICULAR TACHYCARDIA) (HCC): ICD-10-CM

## 2020-10-21 PROCEDURE — G8427 DOCREV CUR MEDS BY ELIG CLIN: HCPCS | Performed by: INTERNAL MEDICINE

## 2020-10-21 PROCEDURE — G0439 PPPS, SUBSEQ VISIT: HCPCS | Performed by: INTERNAL MEDICINE

## 2020-10-21 PROCEDURE — G8536 NO DOC ELDER MAL SCRN: HCPCS | Performed by: INTERNAL MEDICINE

## 2020-10-21 PROCEDURE — G8432 DEP SCR NOT DOC, RNG: HCPCS | Performed by: INTERNAL MEDICINE

## 2020-10-21 PROCEDURE — G8417 CALC BMI ABV UP PARAM F/U: HCPCS | Performed by: INTERNAL MEDICINE

## 2020-10-21 PROCEDURE — 1090F PRES/ABSN URINE INCON ASSESS: CPT | Performed by: INTERNAL MEDICINE

## 2020-10-21 PROCEDURE — 99214 OFFICE O/P EST MOD 30 MIN: CPT | Performed by: INTERNAL MEDICINE

## 2020-10-21 PROCEDURE — 1101F PT FALLS ASSESS-DOCD LE1/YR: CPT | Performed by: INTERNAL MEDICINE

## 2020-10-21 NOTE — PROGRESS NOTES
Chief Complaint   Patient presents with    Follow Up Chronic Condition     1. Have you been to the ER, urgent care clinic since your last visit? Hospitalized since your last visit? No    2. Have you seen or consulted any other health care providers outside of the [address] [place] [address] since your last visit? Include any pap smears or colon screening.  No     Health Maintenance Due   Topic    DTaP/Tdap/Td series (1 - Tdap)    Shingrix Vaccine Age 50> (1 of 2)    Flu Vaccine (1)    Medicare Yearly Exam     Foot Exam Q1     MICROALBUMIN Q1     Lipid Screen

## 2020-10-21 NOTE — ACP (ADVANCE CARE PLANNING)
Advance Care Planning       Advance Care Planning (ACP) Physician/NP/[place] (Provider) Conversation        Date of ACP Conversation: 10/21/2020    Conversation Conducted with:   Patient with [address] Maker:    Current Designated Health Care Decision Maker:   Primary Decision Maker: Jeovanny Vann Daughter - [phone]    Secondary Decision Maker: Carey Johanna - [phone]  (If there is a [address] named in the [address] [employer]\" box in the ACP activity, but it is not visible above, be sure to open that field and then select the health care decision maker relationship (ie \"primary\") in the blank space to the right of the name.)    Note: Assess and validate information in current ACP documents, as indicated. Note: If the relationship of these Decision-Makers to the patient does NOT follow your state's Next of Kin hierarchy, recommend that patient complete ACP document that meets state-specific requirements to allow them to act on the patient's behalf when appropriate. Care Preferences:    Hospitalization: \"If your health worsens and it becomes clear that your chance of recovery is unlikely, what would your preference be regarding hospitalization? \"  If the patient would want hospitalization, answer \"yes\". If the patient would prefer comfort-focused treatment without hospitalization, answer \"no\". no      Ventilation: \"If you were in your present state of health and suddenly became very ill and were unable to breathe on your own, what would your preference be about the use of a ventilator (breathing machine) if it was available to you? \"    If patient would desire the use of a ventilator (breathing machine), answer \"yes\", if not answer \"no\":no    \"If your health worsens and it becomes clear that your chance of recovery is unlikely, what would your preference be about the use of a ventilator (breathing machine) if it was available to you? \"   no      Resuscitation:  \"CPR works best to restart the heart when there is a sudden event, like a heart attack, in someone who is otherwise healthy. Unfortunately, CPR does not typically restart the heart for people who have serious health conditions or who are very sick. \"    \"In the event your heart stopped as a result of an underlying serious health condition, would you want attempts to be made to restart your heart (answer \"yes\" for attempt to resuscitate) or would you prefer a natural death (answer \"no\" for do not attempt to resuscitate)? \"   yes    NOTE: If the patient has a valid advance directive AND provides care preference(s) that are inconsistent with that prior directive, advise the patient to consider either: creating a new advance directive that complies with state-specific requirements; or, if that is not possible, orally revoking that prior directive in accordance with state-specific requirements, which must be documented in the EHR.     Conversation Outcomes / Follow-Up Plan:   Recommended completion of Advance Directive      Length of Voluntary ACP Conversation in minutes:  <16 minutes (Non-Billable)      Theo Woodson MD

## 2020-10-21 NOTE — PATIENT INSTRUCTIONS
Medicare Wellness Visit, Female The best way to live healthy is to have a lifestyle where you eat a well-balanced diet, exercise regularly, limit alcohol use, and quit all forms of tobacco/nicotine, if applicable. Regular preventive services are another way to keep healthy. Preventive services (vaccines, screening tests, monitoring & exams) can help personalize your care plan, which helps you manage your own care. Screening tests can find health problems at the earliest stages, when they are easiest to treat. [facility] follows the current, evidence-based guidelines published by the [facility] ([facility]) when recommending preventive services for our patients. Because we follow these guidelines, sometimes recommendations change over time as research supports it. (For example, mammograms used to be recommended annually. Even though Medicare will still pay for an annual mammogram, the newer guidelines recommend a mammogram every two years for women of average risk). Of course, you and your doctor may decide to screen more often for some diseases, based on your risk and your co-morbidities (chronic disease you are already diagnosed with). Preventive services for you include: - Medicare offers their members a free annual wellness visit, which is time for you and your primary care provider to discuss and plan for your preventive service needs. Take advantage of this benefit every year! 
-All adults over the age of 72 should receive the recommended pneumonia vaccines. Current USPSTF guidelines recommend a series of two vaccines for the best pneumonia protection.  
-All adults should have a flu vaccine yearly and a tetanus vaccine every 10 years.  
-All adults age 48 and older should receive the shingles vaccines (series of two vaccines). -All adults age 38-68 who are overweight should have a diabetes screening test once every three years. -All adults born between [birth year] and 1965 should be screened once for Hepatitis C. 
-Other screening tests and preventive services for persons with diabetes include: an eye exam to screen for diabetic retinopathy, a kidney function test, a foot exam, and stricter control over your cholesterol.  
-Cardiovascular screening for adults with routine risk involves an electrocardiogram (ECG) at intervals determined by your doctor.  
-Colorectal cancer screenings should be done for adults age 54-65 with no increased risk factors for colorectal cancer. There are a number of acceptable methods of screening for this type of cancer. Each test has its own benefits and drawbacks. Discuss with your doctor what is most appropriate for you during your annual wellness visit. The different tests include: colonoscopy (considered the best screening method), a fecal occult blood test, a fecal DNA test, and sigmoidoscopy. 
 
-A bone mass density test is recommended when a woman turns 65 to screen for osteoporosis. This test is only recommended one time, as a screening. Some providers will use this same test as a disease monitoring tool if you already have osteoporosis. -Breast cancer screenings are recommended every other year for women of normal risk, age 54-69. 
-Cervical cancer screenings for women over age 72 are only recommended with certain risk factors. Here is a list of your current Health Maintenance items (your personalized list of preventive services) with a due date: 
Health Maintenance Due Topic Date Due  
 DTaP/Tdap/Td  (1 - Tdap) 12/30/1950  Shingles Vaccine (1 of 2) 12/30/1979  Yearly Flu Vaccine (1) 09/01/2020 [address] [place] [address] Annual Well Visit  10/23/2020  Diabetic Foot Care  10/23/2020  Albumin Urine Test  10/23/2020  Cholesterol Test   10/23/2020

## 2020-10-21 NOTE — PROGRESS NOTES
Tina Morton is a 80 y.o. female who was seen by synchronous (real-time) audio-video technology on 10/21/2020 for Follow Up Chronic Condition (HTN, DM)        Assessment & Plan:   Diagnoses and all orders for this visit:    1. Medicare annual wellness visit, subsequent-see note below    2. Essential hypertension-controlled, continue with beta blocker and home monitoring  -     CBC WITH AUTOMATED DIFF; Future  -     METABOLIC PANEL, COMPREHENSIVE; Future    3. Paroxysmal SVT (supraventricular tachycardia) (HCC)-has seen Cardio for this, no med changes done and Echo showed mild diastolic dysfunction and reduced EF to 40%    4. Hyperlipidemia, unspecified hyperlipidemia type-on Lipitor, will consider getting her off of this bec of her age  -     LIPID PANEL; Future    5. Type 2 diabetes mellitus with hyperglycemia, without long-term current use of insulin ([facility])  -     HEMOGLOBIN A1C WITH EAG; Future  -     MICROALBUMIN, UR, RAND W/ MICROALB/CREAT RATIO; Future    6. Stage 3a chronic kidney disease-continue to avoid OTC NSAIDs  -     METABOLIC PANEL, COMPREHENSIVE; Future    7. 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 712  Subjective:     Since last visit with me,she saw Mayme Harada med changes  Had echo  Interpretation Summary     · LV: Estimated LVEF is 40 - 45%. Visually measured ejection fraction. Normal cavity size. Asymmetric hypertrophy. Wall motion: normal. Mild (grade 1) left ventricular diastolic dysfunction. · RV: Reduced systolic function. · LA: Left Atrium volume index is 34.58 mL/m2. · AV: With no evidence of reduced excursion. · TV: Mild tricuspid valve regurgitation is present. · MV: Mitral valve non-specific thickening. Mild mitral valve regurgitation is present. · PA: Pulmonary arterial systolic pressure is 32 mmHg. · Pericardium: Pericardial fat pad present. Patient denies dizziness or palpitations    Rigth parotid gland enlargement not bothering      Lab Results   Component Value Date/Time    Cholesterol, total 152 10/23/2019 10:38 AM    HDL Cholesterol 44 10/23/2019 10:38 AM    LDL, calculated 85 10/23/2019 10:38 AM    VLDL, calculated 23 10/23/2019 10:38 AM    Triglyceride 115 10/23/2019 10:38 AM    CHOL/HDL Ratio 3.8 09/23/2010 01:08 PM   on Lipitor-compliant    Last Point of Care HGB A1C  Hemoglobin A1c (POC)   Date Value Ref Range Status   10/23/2019 5.6 % Final    currently not on meds for DM      Prior to Admission medications    Medication Sig Start Date End Date Taking? Authorizing Provider   aspirin delayed-release 81 mg tablet Take  by mouth daily. Yes Provider, Historical   metoprolol tartrate (LOPRESSOR) 25 mg tablet Take 1 Tab by mouth two (2) times a day. 6/12/20  Yes Marcie Alberto MD   atorvastatin (LIPITOR) 10 mg tablet Take 1 Tab by mouth daily. 6/12/20  Yes Marcie Alberto MD   cetirizine (ZYRTEC) 5 mg tablet Take 1 Tab by mouth daily. 1/6/20  Yes Jordi Hall NP   Blood-Glucose Meter (ACCU-CHEK SALLIE PLUS METER) misc accu check sallie plus meter DX: E11.65 1/4/17  Yes Provider, Historical   acetaminophen (TYLENOL) 325 mg tablet Take  by mouth every four (4) hours as needed for Pain.    Yes Provider, Historical   FREESTYLE TEST strip PATIENT TESTING BLOOD SUGAR ONCE DAILY 12/2/14  Yes Chica Mark MD   FREESTYLE LANCETS 28 gauge [facility] DAILY USE 12/2/14  Yes Chica Mark MD     Patient Active Problem List    Diagnosis Date Noted    Mass of parotid gland 01/08/2019    Type 2 diabetes with nephropathy ([facility] [address] 09/26/2018    CKD (chronic kidney disease) stage 3, GFR 30-59 ml/min 05/15/2018    Type 2 diabetes mellitus with hyperglycemia, without long-term current use of insulin ([facility] [address] 02/26/2018    Overweight (BMI 25.0-29.9) 01/09/2018    Advanced directives, counseling/discussion 10/10/2017    Essential hypertension 05/25/2017    Osteoporosis without current pathological fracture 05/25/2017    Hyperlipidemia 05/25/2017    Primary osteoarthritis of both knees 05/25/2017       ROS  Pt denies: Wt loss, Fever/Chills, HA, Visual changes, Fatigue, Chest pain, SOB, SALOMON, Abd pain, N/V/D/C, Blood in stool or urine, Edema. Pertinent positive as above in HPI. All others were negative    Wt Readings from Last 3 Encounters:   08/31/20 159 lb (72.1 kg)   08/19/20 159 lb (72.1 kg)   02/27/20 154 lb (69.9 kg)     Objective:     Patient-Reported Vitals 10/21/2020   Patient-Reported Weight 162   Patient-Reported Pulse -   Patient-Reported Systolic  182   Patient-Reported Diastolic 74      General: alert, cooperative, no distress   Mental  status: normal mood, behavior, speech, dress, motor activity, and thought processes, able to follow commands   HENT: NCAT   Neck: no visualized mass   Resp: no respiratory distress   Neuro: no gross deficits   Skin: no discoloration or lesions of concern on visible areas   Psychiatric: normal affect, consistent with stated mood, no evidence of hallucinations     Additional exam findings: elderly      We discussed the expected course, resolution and complications of the diagnosis(es) in detail. Medication risks, benefits, costs, interactions, and alternatives were discussed as indicated. I advised her to contact the office if her condition worsens, changes or fails to improve as anticipated. She expressed understanding with the diagnosis(es) and plan. Gayathri Cottoa, who was evaluated through a patient-initiated, synchronous (real-time) audio-video encounter, and/or her healthcare decision maker, is aware that it is a billable service, with coverage as determined by her insurance carrier. She provided verbal consent to proceed: Yes, and patient identification was verified.  It was conducted pursuant to the emergency declaration under the [address] [facility] [address] waiver authority and the [employer] Act. A caregiver was present when appropriate. Ability to conduct physical exam was limited. I was at home. The patient was at home. Prudencio Roldan MD    This is the Subsequent Medicare Annual Wellness Exam, performed 12 months or more after the Initial AWV or the last Subsequent AWV    I have reviewed the patient's medical history in detail and updated the computerized patient record. History     Patient Active Problem List   Diagnosis Code    Essential hypertension I10    Osteoporosis without current pathological fracture M81.0    Hyperlipidemia E78.5    Primary osteoarthritis of both knees M17.0    Advanced directives, counseling/discussion Z71.89    Overweight (BMI 25.0-29. 9) E66.3    Type 2 diabetes mellitus with hyperglycemia, without long-term current use of insulin (HCC) E11.65    CKD (chronic kidney disease) stage 3, GFR 30-59 ml/min N18.30    Type 2 diabetes with nephropathy (HCC) E11.21    Mass of parotid gland K11.8     Past Medical History:   Diagnosis Date    Diabetes ([facility] [address]     Essential hypertension 5/25/2017    Hyperlipidemia 5/25/2017    Paroxysmal tachycardia ([facility] [address] 9/18/2018      Past Surgical History:   Procedure Laterality Date    HX HYSTERECTOMY       Current Outpatient Medications   Medication Sig Dispense Refill    aspirin delayed-release 81 mg tablet Take  by mouth daily.  metoprolol tartrate (LOPRESSOR) 25 mg tablet Take 1 Tab by mouth two (2) times a day. 180 Tab 1    atorvastatin (LIPITOR) 10 mg tablet Take 1 Tab by mouth daily. 90 Tab 1    cetirizine (ZYRTEC) 5 mg tablet Take 1 Tab by mouth daily. 30 Tab 0    Blood-Glucose Meter (ACCU-CHEK CARLO PLUS METER) misc accu check carlo plus meter DX: E11.65      acetaminophen (TYLENOL) 325 mg tablet Take  by mouth every four (4) hours as needed for Pain.       FREESTYLE TEST strip PATIENT TESTING BLOOD SUGAR ONCE DAILY 1 Package 10    FREESTYLE LANCETS 28 gauge misc DAILY USE 1 Package 10     No Known Allergies    Family History   Problem Relation Age of Onset    Kidney Disease Mother     Hypertension Sister     Kidney Disease Brother      Social History     Tobacco Use    Smoking status: Never Smoker    Smokeless tobacco: Never Used   Substance Use Topics    Alcohol use: No       Depression Risk Factor Screening:     3 most recent PHQ Screens 8/19/2020   Little interest or pleasure in doing things Not at all   Feeling down, depressed, irritable, or hopeless Not at all   Total Score PHQ 2 0       Alcohol Risk Screen   Do you average more than 1 drink per night or more than 7 drinks a week:  No    On any one occasion in the past three months have you have had more than 3 drinks containing alcohol:  No        Functional Ability and Level of Safety:   Hearing: Hearing is good. Activities of Daily Living: The home contains: handrails and grab bars  Patient needs help with:  phone, transportation and shopping     Ambulation: with difficulty, uses a cane-occasional     Fall Risk:  Fall Risk Assessment, last 12 mths 8/19/2020   Able to walk? Yes   Fall in past 12 months?  No   Fall with injury? -   Number of falls in past 12 months -   Fall Risk Score -     Abuse Screen:  Patient is not abused   Lives with grand daughter    Cognitive Screening   Has your family/caregiver stated any concerns about your memory: no    Cognitive Screening: Normal - not needed    Patient Care Team   Patient Care Team:  Marley Watkins MD as PCP - General (Internal Medicine)  Marley Watkins MD as PCP - REHABILITATION [facility] Empaneled Provider  Gabriella Duval MD (Ophthalmology)  Elizabeth Garcia MD as Physician (Otolaryngology)    Assessment/Plan   Education and counseling provided:  Are appropriate based on today's review and evaluation  End-of-Life planning (with patient's consent)-see ACP note  Pneumococcal Vaccine-done  Influenza Vaccine-shortly  Cardiovascular screening blood test-ordered  Screening for glaucoma-eye exam is up to date  Diabetes screening test-A1C ordered    Diagnoses and all orders for this visit:    1. Medicare annual wellness visit, subsequent-Refer to above for plan and to patient instructions for recommendations on HM    2. Advanced directives, counseling/discussion        Health Maintenance Due   Topic Date Due    DTaP/Tdap/Td series (1 - Tdap) 12/30/1950    Shingrix Vaccine Age 50> (1 of 2) 12/30/1979    Flu Vaccine (1) 09/01/2020    Medicare Yearly Exam  10/23/2020    Foot Exam Q1 -next visit 10/23/2020    MICROALBUMIN Q1  10/23/2020    Lipid Screen  10/23/2020   RTC yearly for wellness visit    Lisa Farah, who was evaluated through a synchronous (real-time) audio-video encounter, and/or her healthcare decision maker, is aware that it is a billable service, with coverage as determined by her insurance carrier. She provided verbal consent to proceed: Yes, and patient identification was verified. It was conducted pursuant to the emergency declaration under the [facility], [address] [place] [address] authority and the [employer] and [employer] General Act. A caregiver was present when appropriate. Ability to conduct physical exam was limited. I was at home. The patient was at home.     Lita Melendez MD

## 2020-12-01 ENCOUNTER — TELEPHONE (OUTPATIENT)
Dept: FAMILY MEDICINE CLINIC | Age: 85
End: 2020-12-01

## 2020-12-01 ENCOUNTER — VIRTUAL VISIT (OUTPATIENT)
Dept: FAMILY MEDICINE CLINIC | Age: 85
End: 2020-12-01
Payer: MEDICARE

## 2020-12-01 DIAGNOSIS — E78.5 HYPERLIPIDEMIA, UNSPECIFIED HYPERLIPIDEMIA TYPE: ICD-10-CM

## 2020-12-01 DIAGNOSIS — I10 ESSENTIAL HYPERTENSION: ICD-10-CM

## 2020-12-01 DIAGNOSIS — J20.9 ACUTE BRONCHITIS, UNSPECIFIED ORGANISM: Primary | ICD-10-CM

## 2020-12-01 PROCEDURE — 1090F PRES/ABSN URINE INCON ASSESS: CPT | Performed by: INTERNAL MEDICINE

## 2020-12-01 PROCEDURE — 1101F PT FALLS ASSESS-DOCD LE1/YR: CPT | Performed by: INTERNAL MEDICINE

## 2020-12-01 PROCEDURE — 99213 OFFICE O/P EST LOW 20 MIN: CPT | Performed by: INTERNAL MEDICINE

## 2020-12-01 PROCEDURE — G8427 DOCREV CUR MEDS BY ELIG CLIN: HCPCS | Performed by: INTERNAL MEDICINE

## 2020-12-01 PROCEDURE — G8432 DEP SCR NOT DOC, RNG: HCPCS | Performed by: INTERNAL MEDICINE

## 2020-12-01 RX ORDER — ALBUTEROL SULFATE 90 UG/1
2 AEROSOL, METERED RESPIRATORY (INHALATION)
Status: DISCONTINUED | OUTPATIENT
Start: 2020-12-01 | End: 2022-05-05

## 2020-12-01 RX ORDER — HYDROCODONE POLISTIREX AND CHLORPHENIRAMINE POLISTIREX 10; 8 MG/5ML; MG/5ML
1 SUSPENSION, EXTENDED RELEASE ORAL
Qty: 100 ML | Refills: 0 | Status: SHIPPED | OUTPATIENT
Start: 2020-12-01 | End: 2020-12-08

## 2020-12-01 RX ORDER — ATORVASTATIN CALCIUM 10 MG/1
10 TABLET, FILM COATED ORAL DAILY
Qty: 90 TAB | Refills: 1 | Status: SHIPPED | OUTPATIENT
Start: 2020-12-01 | End: 2021-06-30 | Stop reason: SDUPTHER

## 2020-12-01 RX ORDER — METOPROLOL TARTRATE 25 MG/1
25 TABLET, FILM COATED ORAL 2 TIMES DAILY
Qty: 180 TAB | Refills: 1 | Status: SHIPPED | OUTPATIENT
Start: 2020-12-01 | End: 2021-01-08 | Stop reason: SDUPTHER

## 2020-12-01 RX ORDER — AZITHROMYCIN 250 MG/1
TABLET, FILM COATED ORAL
Qty: 6 TAB | Refills: 0 | Status: SHIPPED | OUTPATIENT
Start: 2020-12-01 | End: 2020-12-06

## 2020-12-01 NOTE — PATIENT INSTRUCTIONS
Bronchitis: Care Instructions Your Care Instructions Bronchitis is inflammation of the bronchial tubes, which carry air to the lungs. The tubes swell and produce mucus, or phlegm. The mucus and inflamed bronchial tubes make you cough. You may have trouble breathing. Most cases of bronchitis are caused by viruses like those that cause colds. Antibiotics usually do not help and they may be harmful. Bronchitis usually develops rapidly and lasts about 2 to 3 weeks in otherwise healthy people. Follow-up care is a key part of your treatment and safety. Be sure to make and go to all appointments, and call your doctor if you are having problems. It's also a good idea to know your test results and keep a list of the medicines you take. How can you care for yourself at home? · Take all medicines exactly as prescribed. Call your doctor if you think you are having a problem with your medicine. · Get some extra rest. 
· Take an over-the-counter pain medicine, such as acetaminophen (Tylenol), ibuprofen (Advil, Motrin), or naproxen (Aleve) to reduce fever and relieve body aches. Read and follow all instructions on the label. · Do not take two or more pain medicines at the same time unless the doctor told you to. Many pain medicines have acetaminophen, which is Tylenol. Too much acetaminophen (Tylenol) can be harmful. · Take an over-the-counter cough medicine that contains dextromethorphan to help quiet a dry, hacking cough so that you can sleep. Avoid cough medicines that have more than one active ingredient. Read and follow all instructions on the label. · Breathe moist air from a humidifier, hot shower, or sink filled with hot water. The heat and moisture will thin mucus so you can cough it out. · Do not smoke. Smoking can make bronchitis worse. If you need help quitting, talk to your doctor about stop-smoking programs and medicines. These can increase your chances of quitting for good. When should you call for help? Call 911 anytime you think you may need emergency care. For example, call if: 
  · You have severe trouble breathing. Call your doctor now or seek immediate medical care if: 
  · You have new or worse trouble breathing.  
  · You cough up dark brown or bloody mucus (sputum).  
  · You have a new or higher fever.  
  · You have a new rash. Watch closely for changes in your health, and be sure to contact your doctor if: 
  · You cough more deeply or more often, especially if you notice more mucus or a change in the color of your mucus.  
  · You are not getting better as expected. Where can you learn more? Go to http://www.gray.com/ Enter H333 in the search box to learn more about \"Bronchitis: Care Instructions. \" Current as of: February 24, 2020               Content Version: 12.6 © 9376-6913 [employer]. Care instructions adapted under license by [employer] (which disclaims liability or warranty for this information). If you have questions about a medical condition or this instruction, always ask your healthcare professional. [address] any warranty or liability for your use of this information.

## 2020-12-01 NOTE — PROGRESS NOTES
Chief Complaint   Patient presents with    Cough     x2 days; productive     1. Have you been to the ER, urgent care clinic since your last visit? Hospitalized since your last visit? Yes Where: [facility]    2. Have you seen or consulted any other health care providers outside of the [address] [place] [address] since your last visit? Include any pap smears or colon screening.  No     Health Maintenance Due   Topic    DTaP/Tdap/Td series (1 - Tdap)    Shingrix Vaccine Age 50> (1 of 2)    Foot Exam Q1     MICROALBUMIN Q1     Lipid Screen

## 2020-12-01 NOTE — PROGRESS NOTES
Farhad Chilel is a 80 y.o. female who was seen by synchronous (real-time) audio-video technology on 12/1/2020 for Cough (x2 days; productive)        Assessment & Plan:   Diagnoses and all orders for this visit:    1. Acute bronchitis, unspecified organism-advised to give her the cough syrup only at night and not during the day when she is alone at home  -     HYDROcodone-chlorpheniramine (TUSSIONEX) 10-8 mg/5 mL suspension; Take 5 mL by mouth every twelve (12) hours as needed for Cough for up to 7 days. Max Daily Amount: 10 mL.  -     azithromycin (ZITHROMAX) 250 mg tablet; Take 2 tablets today, then take 1 tablet daily  -     inhalational spacing device; 1 Each by Does Not Apply route as needed (cough/shortness of breath). For use with Albuterol inhaler  -     albuterol (PROVENTIL HFA, VENTOLIN HFA, PROAIR HFA) inhaler 2 Puff    2. Essential hypertension  -     metoprolol tartrate (LOPRESSOR) 25 mg tablet; Take 1 Tab by mouth two (2) times a day. 3. Hyperlipidemia, unspecified hyperlipidemia type  -     atorvastatin (LIPITOR) 10 mg tablet; Take 1 Tab by mouth daily. To ER if fever or worsening SOB    Follow-up and Dispositions    · Return in about 2 months (around 2/1/2021), or if symptoms worsen or fail to improve, for follow up.          712  Subjective:     Health Maintenance Due   Topic Date Due    DTaP/Tdap/Td series (1 - Tdap) 12/30/1950    Shingrix Vaccine Age 50> (1 of 2) 12/30/1979    Foot Exam Q1  10/23/2020    MICROALBUMIN Q1  10/23/2020    Lipid Screen  10/23/2020     Seen for an acute visit for the above    Current sxs started about 2-3 days ago  No sick contacts    Coughing a lot; at night is worse  No fever or SOB  Gets SOB only when coughing continuously    [place] daughter who lives with herand who works [employer] never works; has taken codeine before without side effects of confusion    Appetite ok but feels fatigued    Needs refill of meds for HTN and cholesterol      Prior to Admission medications    Medication Sig Start Date End Date Taking? Authorizing Provider   aspirin delayed-release 81 mg tablet Take  by mouth daily. Yes Provider, Historical   metoprolol tartrate (LOPRESSOR) 25 mg tablet Take 1 Tab by mouth two (2) times a day. 6/12/20  Yes Marcie Alberto MD   atorvastatin (LIPITOR) 10 mg tablet Take 1 Tab by mouth daily. 6/12/20  Yes Marcie Alberto MD   cetirizine (ZYRTEC) 5 mg tablet Take 1 Tab by mouth daily. 1/6/20  Yes Christophe Medina NP   Blood-Glucose Meter (ACCU-CHEK SALLIE PLUS METER) misc accu check sallie plus meter DX: E11.65 1/4/17  Yes Provider, Historical   acetaminophen (TYLENOL) 325 mg tablet Take  by mouth every four (4) hours as needed for Pain. Yes Provider, Historical   FREESTYLE TEST strip PATIENT TESTING BLOOD SUGAR ONCE DAILY 12/2/14  Yes Shawn Kenny MD   FREESTYLE LANCETS 28 gauge [facility] DAILY USE 12/2/14  Yes Shawn Kenny MD     Patient Active Problem List    Diagnosis Date Noted    Mass of parotid gland 01/08/2019    Type 2 diabetes with nephropathy ([facility] 09/26/2018    CKD (chronic kidney disease) stage 3, GFR 30-59 ml/min 05/15/2018    Type 2 diabetes mellitus with hyperglycemia, without long-term current use of insulin ([facility] 02/26/2018    Overweight (BMI 25.0-29.9) 01/09/2018    Advanced directives, counseling/discussion 10/10/2017    Essential hypertension 05/25/2017    Osteoporosis without current pathological fracture 05/25/2017    Hyperlipidemia 05/25/2017    Primary osteoarthritis of both knees 05/25/2017       ROS  Pt denies: Wt loss, Fever/Chills, HA, Visual changes, Fatigue, Chest pain, SOB, SALOMON, Abd pain, N/V/D/C, Blood in stool or urine, Edema. Pertinent positive as above in HPI.  All others were negative  Objective:     Patient-Reported Vitals 10/21/2020   Patient-Reported Weight 162   Patient-Reported Pulse -   Patient-Reported Systolic  318   Patient-Reported Diastolic 74      General: alert, cooperative, no distress   Mental  status: normal mood, behavior, speech, dress, motor activity, and thought processes, able to follow commands   HENT: NCAT   Neck: no visualized mass   Resp: no respiratory distress   Neuro: no gross deficits   Skin: no discoloration or lesions of concern on visible areas   Psychiatric: normal affect, consistent with stated mood, no evidence of hallucinations     Additional exam findings: patient was in bed which is unusual for her; did not appear short of breath      We discussed the expected course, resolution and complications of the diagnosis(es) in detail. Medication risks, benefits, costs, interactions, and alternatives were discussed as indicated. I advised her to contact the office if her condition worsens, changes or fails to improve as anticipated. She expressed understanding with the diagnosis(es) and plan. Char Mendoza, who was evaluated through a patient-initiated, synchronous (real-time) audio-video encounter, and/or her healthcare decision maker, is aware that it is a billable service, with coverage as determined by her insurance carrier. She provided verbal consent to proceed: Yes, and patient identification was verified. It was conducted pursuant to the emergency declaration under the [facility], [address] [place] [address] authority and the [employer] General Act. A caregiver was present when appropriate. Ability to conduct physical exam was limited. I was at home. The patient was at home.       Rehana Lazo MD

## 2021-01-08 ENCOUNTER — OFFICE VISIT (OUTPATIENT)
Dept: FAMILY MEDICINE CLINIC | Age: 86
End: 2021-01-08
Payer: MEDICARE

## 2021-01-08 VITALS
SYSTOLIC BLOOD PRESSURE: 128 MMHG | HEART RATE: 79 BPM | DIASTOLIC BLOOD PRESSURE: 80 MMHG | HEIGHT: 61 IN | RESPIRATION RATE: 17 BRPM | BODY MASS INDEX: 30.93 KG/M2 | WEIGHT: 163.8 LBS | TEMPERATURE: 97.8 F | OXYGEN SATURATION: 96 %

## 2021-01-08 DIAGNOSIS — E11.65 TYPE 2 DIABETES MELLITUS WITH HYPERGLYCEMIA, WITHOUT LONG-TERM CURRENT USE OF INSULIN (HCC): ICD-10-CM

## 2021-01-08 DIAGNOSIS — K11.8 MASS OF PAROTID GLAND: ICD-10-CM

## 2021-01-08 DIAGNOSIS — I10 ESSENTIAL HYPERTENSION: Primary | ICD-10-CM

## 2021-01-08 DIAGNOSIS — I47.1 PAROXYSMAL SVT (SUPRAVENTRICULAR TACHYCARDIA) (HCC): ICD-10-CM

## 2021-01-08 DIAGNOSIS — E78.5 HYPERLIPIDEMIA, UNSPECIFIED HYPERLIPIDEMIA TYPE: ICD-10-CM

## 2021-01-08 DIAGNOSIS — N18.31 STAGE 3A CHRONIC KIDNEY DISEASE (HCC): ICD-10-CM

## 2021-01-08 PROCEDURE — G8417 CALC BMI ABV UP PARAM F/U: HCPCS | Performed by: INTERNAL MEDICINE

## 2021-01-08 PROCEDURE — G8536 NO DOC ELDER MAL SCRN: HCPCS | Performed by: INTERNAL MEDICINE

## 2021-01-08 PROCEDURE — G8432 DEP SCR NOT DOC, RNG: HCPCS | Performed by: INTERNAL MEDICINE

## 2021-01-08 PROCEDURE — 1101F PT FALLS ASSESS-DOCD LE1/YR: CPT | Performed by: INTERNAL MEDICINE

## 2021-01-08 PROCEDURE — G8427 DOCREV CUR MEDS BY ELIG CLIN: HCPCS | Performed by: INTERNAL MEDICINE

## 2021-01-08 PROCEDURE — 99214 OFFICE O/P EST MOD 30 MIN: CPT | Performed by: INTERNAL MEDICINE

## 2021-01-08 PROCEDURE — 1090F PRES/ABSN URINE INCON ASSESS: CPT | Performed by: INTERNAL MEDICINE

## 2021-01-08 RX ORDER — METOPROLOL TARTRATE 25 MG/1
25 TABLET, FILM COATED ORAL 2 TIMES DAILY
Qty: 180 TAB | Refills: 1 | Status: SHIPPED | OUTPATIENT
Start: 2021-01-08 | End: 2021-12-20 | Stop reason: SDUPTHER

## 2021-01-08 NOTE — PATIENT INSTRUCTIONS
Dizziness: Care Instructions Your Care Instructions Dizziness is the feeling of unsteadiness or fuzziness in your head. It is different than having vertigo, which is a feeling that the room is spinning or that you are moving or falling. It is also different from lightheadedness, which is the feeling that you are about to faint. It can be hard to know what causes dizziness. Some people feel dizzy when they have migraine headaches. Sometimes bouts of flu can make you feel dizzy. Some medical conditions, such as heart problems or high blood pressure, can make you feel dizzy. Many medicines can cause dizziness, including medicines for high blood pressure, pain, or anxiety. If a medicine causes your symptoms, your doctor may recommend that you stop or change the medicine. If it is a problem with your heart, you may need medicine to help your heart work better. If there is no clear reason for your symptoms, your doctor may suggest watching and waiting for a while to see if the dizziness goes away on its own. Follow-up care is a key part of your treatment and safety. Be sure to make and go to all appointments, and call your doctor if you are having problems. It's also a good idea to know your test results and keep a list of the medicines you take. How can you care for yourself at home? · If your doctor recommends or prescribes medicine, take it exactly as directed. Call your doctor if you think you are having a problem with your medicine. · Do not drive while you feel dizzy. · Try to prevent falls. Steps you can take include: ? Using nonskid mats, adding grab bars near the tub, and using night-lights. ? Clearing your home so that walkways are free of anything you might trip on. 
? Letting family and friends know that you have been feeling dizzy. This will help them know how to help you. When should you call for help? Call 911 anytime you think you may need emergency care. For example, call if:   · You passed out (lost consciousness).  
  · You have dizziness along with symptoms of a heart attack. These may include: 
? Chest pain or pressure, or a strange feeling in the chest. 
? Sweating. ? Shortness of breath. ? Nausea or vomiting. ? Pain, pressure, or a strange feeling in the back, neck, jaw, or upper belly or in one or both shoulders or arms. ? Lightheadedness or sudden weakness. ? A fast or irregular heartbeat.  
  · You have symptoms of a stroke. These may include: 
? Sudden numbness, tingling, weakness, or loss of movement in your face, arm, or leg, especially on only one side of your body. ? Sudden vision changes. ? Sudden trouble speaking. ? Sudden confusion or trouble understanding simple statements. ? Sudden problems with walking or balance. ? A sudden, severe headache that is different from past headaches. Call your doctor now or seek immediate medical care if: 
  · You feel dizzy and have a fever, headache, or ringing in your ears.  
  · You have new or increased nausea and vomiting.  
  · Your dizziness does not go away or comes back. Watch closely for changes in your health, and be sure to contact your doctor if: 
  · You do not get better as expected. Where can you learn more? Go to http://www.gray.com/ Enter Y993 in the search box to learn more about \"Dizziness: Care Instructions. \" Current as of: June 26, 2019               Content Version: 12.6 © 3802-0554 [employer]. Care instructions adapted under license by [employer] (which disclaims liability or warranty for this information). If you have questions about a medical condition or this instruction, always ask your healthcare professional. [place] [address] any warranty or liability for your use of this information.

## 2021-01-08 NOTE — PROGRESS NOTES
Chief Complaint   Patient presents with    Follow Up Chronic Condition     HTN       Pt is a 80y.o. year old female who presents for follow up of her chronic medical problems    Health Maintenance Due   Topic Date Due    DTaP/Tdap/Td series (1 - Tdap) 12/30/1950    Shingrix Vaccine Age 50> (1 of 2) 12/30/1979    Foot Exam Q1  10/23/2020    MICROALBUMIN Q1  10/23/2020    Lipid Screen  10/23/2020     Had labs done today    Wt Readings from Last 3 Encounters:   01/08/21 163 lb 12.8 oz (74.3 kg)   08/31/20 159 lb (72.1 kg)   08/19/20 159 lb (72.1 kg)       Lab Results   Component Value Date/Time    Cholesterol, total 150 01/08/2021 12:00 AM    HDL Cholesterol 45 01/08/2021 12:00 AM    LDL, calculated 84 01/08/2021 12:00 AM    LDL, calculated 85 10/23/2019 10:38 AM    VLDL, calculated 21 01/08/2021 12:00 AM    VLDL, calculated 23 10/23/2019 10:38 AM    Triglyceride 116 01/08/2021 12:00 AM    CHOL/HDL Ratio 3.8 09/23/2010 01:08 PM   compliant with Lipitor    BP Readings from Last 3 Encounters:   01/08/21 128/80   08/31/20 130/73   08/19/20 130/73   ran out of Metoprolol last week    Dizziness? No    Lab Results   Component Value Date/Time    Hemoglobin A1c 6.3 (H) 01/08/2021 12:00 AM    Hemoglobin A1c (POC) 5.6 10/23/2019 09:51 AM   Denies polyuria, polydipsia and polyphagia  Currently off meds for DM      New issues? None; some symptoms have resolved on their own        ROS:    Pt denies: Wt loss, Fever/Chills, HA, Visual changes, Fatigue, Chest pain, SOB, SALOMON, Abd pain, N/V/D/C, Blood in stool or urine, Edema. Pertinent positive as above in HPI. All others were negative    Patient Active Problem List   Diagnosis Code    Essential hypertension I10    Osteoporosis without current pathological fracture M81.0    Hyperlipidemia E78.5    Primary osteoarthritis of both knees M17.0    Advanced directives, counseling/discussion Z71.89    Overweight (BMI 25.0-29. 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 180 Tab 1    atorvastatin (LIPITOR) 10 mg tablet Take 1 Tab by mouth daily. 90 Tab 1    aspirin delayed-release 81 mg tablet Take  by mouth daily.  acetaminophen (TYLENOL) 325 mg tablet Take  by mouth every four (4) hours as needed for Pain.  inhalational spacing device 1 Each by Does Not Apply route as needed (cough/shortness of breath). For use with Albuterol inhaler 1 Device 0    cetirizine (ZYRTEC) 5 mg tablet Take 1 Tab by mouth daily. 30 Tab 0    Blood-Glucose Meter (ACCU-CHEK SALLIE PLUS METER) misc accu check sallie plus meter DX: E11.65      FREESTYLE TEST strip PATIENT TESTING BLOOD SUGAR ONCE DAILY 1 Package 10    FREESTYLE LANCETS 28 gauge [facility] [place] USE 1 Package 10     Current Facility-Administered Medications   Medication Dose Route Frequency Provider Last Rate Last Admin    albuterol (PROVENTIL HFA, VENTOLIN HFA, PROAIR HFA) inhaler 2 Puff  2 Puff Inhalation Q4H Marcie Bartlett MD           Social History     Tobacco Use   Smoking Status Never Smoker   Smokeless Tobacco Never Used       No Known Allergies    Patient Labs were reviewed: yes      Patient Past Records were reviewed:  yes        Objective:     Vitals:    01/08/21 0921   BP: 128/80   Pulse: 79   Resp: 17   Temp: 97.8 °F (36.6 °C)   TempSrc: Oral   SpO2: 96%   Weight: 163 lb 12.8 oz (74.3 kg)   Height: 5' 1\" (1.549 m)     Body mass index is 30.95 kg/m².     Exam:   Appearance: alert, well appearing,  oriented to person, place, and time, acyanotic, in no respiratory distress and well hydrated. HEENT:  NC/AT, pink conj, anicteric sclerae, nontender mass on the right jaw line  Neck:  No cervical lymphadenopathy, no JVD, no thyromegaly, no carotid bruit  Heart:  RRR without M/R/G  Lungs:  CTAB, no rhonchi, rales, or wheezes with good air exchange   Abdomen:  Non-tender, pos bowel sounds, no hepatosplenomegaly  Ext:  No C/C/E    Skin: no rash  Neuro: no lateralizing signs, CNs II-XII intact      Assessment/ Plan:   Diagnoses and all orders for this visit:    1. Essential hypertension-controlled, continue with low dose Metoprolol  -     metoprolol tartrate (LOPRESSOR) 25 mg tablet; Take 1 Tab by mouth two (2) times a day. 2. Hyperlipidemia, unspecified hyperlipidemia type-at goal on Lipitor    3. Type 2 diabetes mellitus with hyperglycemia, without long-term current use of insulin (HCC)-at goal without meds, continue to watch diet    4. 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 Stage 3a chronic kidney disease-continue to avoid OTC NSAIDs for pain; may take Tylenol prn  Positive for microalb so likely from DM    6. Paroxysmal SVT (supraventricular tachycardia) (HCC)-continue with beta blocker, has seen Cardio for this and had a Holter    Advised to get her Covid vaccine shortly bec of her age/watch for announcements re availability    Follow-up and Dispositions    · Return in about 3 months (around 4/8/2021) for follow up, cancel Feb 1 appt. I have discussed the diagnosis with the patient and the intended plan as seen in the above orders. The patient has received an After-Visit Summary and questions were answered concerning future plans. Medication Side Effects and Warnings were discussed with patient: yes    Patient verbalized understanding of above instructions.     [address], MD  Internal Medicine  [facility]

## 2021-01-08 NOTE — PROGRESS NOTES
Chief Complaint   Patient presents with   • Follow Up Chronic Condition     HTN     1. Have you been to the ER, urgent care clinic since your last visit?  Hospitalized since your last visit?No    2. Have you seen or consulted any other health care providers outside of the [facility] since your last visit?  Include any pap smears or colon screening. No     Health Maintenance Due   Topic   • DTaP/Tdap/Td series (1 - Tdap)   • Shingrix Vaccine Age 50> (1 of 2)   • Foot Exam Q1    • MICROALBUMIN Q1    • Lipid Screen

## 2021-01-10 LAB
ALBUMIN SERPL-MCNC: 4.2 G/DL (ref 3.5–4.6)
ALBUMIN/CREAT UR: 93 MG/G CREAT (ref 0–29)
ALBUMIN/GLOB SERPL: 1.5 {RATIO} (ref 1.2–2.2)
ALP SERPL-CCNC: 101 IU/L (ref 39–117)
ALT SERPL-CCNC: 15 IU/L (ref 0–32)
AST SERPL-CCNC: 20 IU/L (ref 0–40)
BASOPHILS # BLD AUTO: 0 X10E3/UL (ref 0–0.2)
BASOPHILS NFR BLD AUTO: 1 %
BILIRUB SERPL-MCNC: 0.4 MG/DL (ref 0–1.2)
BUN SERPL-MCNC: 13 MG/DL (ref 10–36)
BUN/CREAT SERPL: 11 (ref 12–28)
CALCIUM SERPL-MCNC: 9.3 MG/DL (ref 8.7–10.3)
CHLORIDE SERPL-SCNC: 106 MMOL/L (ref 96–106)
CHOLEST SERPL-MCNC: 150 MG/DL (ref 100–199)
CO2 SERPL-SCNC: 24 MMOL/L (ref 20–29)
CREAT SERPL-MCNC: 1.19 MG/DL (ref 0.57–1)
CREAT UR-MCNC: 139.3 MG/DL
EOSINOPHIL # BLD AUTO: 0.1 X10E3/UL (ref 0–0.4)
EOSINOPHIL NFR BLD AUTO: 1 %
ERYTHROCYTE [DISTWIDTH] IN BLOOD BY AUTOMATED COUNT: 15.5 % (ref 11.7–15.4)
EST. AVERAGE GLUCOSE BLD GHB EST-MCNC: 134 MG/DL
GLOBULIN SER CALC-MCNC: 2.8 G/DL (ref 1.5–4.5)
GLUCOSE SERPL-MCNC: 96 MG/DL (ref 65–99)
HBA1C MFR BLD: 6.3 % (ref 4.8–5.6)
HCT VFR BLD AUTO: 40 % (ref 34–46.6)
HDLC SERPL-MCNC: 45 MG/DL
HGB BLD-MCNC: 13.1 G/DL (ref 11.1–15.9)
IMM GRANULOCYTES # BLD AUTO: 0 X10E3/UL (ref 0–0.1)
IMM GRANULOCYTES NFR BLD AUTO: 0 %
INTERPRETATION, 910389: NORMAL
INTERPRETATION: NORMAL
LDLC SERPL CALC-MCNC: 84 MG/DL (ref 0–99)
LYMPHOCYTES # BLD AUTO: 1.8 X10E3/UL (ref 0.7–3.1)
LYMPHOCYTES NFR BLD AUTO: 37 %
MCH RBC QN AUTO: 26 PG (ref 26.6–33)
MCHC RBC AUTO-ENTMCNC: 32.8 G/DL (ref 31.5–35.7)
MCV RBC AUTO: 80 FL (ref 79–97)
MICROALBUMIN UR-MCNC: 129.1 UG/ML
MONOCYTES # BLD AUTO: 0.4 X10E3/UL (ref 0.1–0.9)
MONOCYTES NFR BLD AUTO: 9 %
NEUTROPHILS # BLD AUTO: 2.5 X10E3/UL (ref 1.4–7)
NEUTROPHILS NFR BLD AUTO: 52 %
PDF IMAGE, 910387: NORMAL
PLATELET # BLD AUTO: 287 X10E3/UL (ref 150–450)
POTASSIUM SERPL-SCNC: 4.4 MMOL/L (ref 3.5–5.2)
PROT SERPL-MCNC: 7 G/DL (ref 6–8.5)
RBC # BLD AUTO: 5.03 X10E6/UL (ref 3.77–5.28)
SODIUM SERPL-SCNC: 144 MMOL/L (ref 134–144)
TRIGL SERPL-MCNC: 116 MG/DL (ref 0–149)
VLDLC SERPL CALC-MCNC: 21 MG/DL (ref 5–40)
WBC # BLD AUTO: 4.9 X10E3/UL (ref 3.4–10.8)

## 2021-04-29 ENCOUNTER — OFFICE VISIT (OUTPATIENT)
Dept: FAMILY MEDICINE CLINIC | Age: 86
End: 2021-04-29
Payer: MEDICARE

## 2021-04-29 VITALS
SYSTOLIC BLOOD PRESSURE: 126 MMHG | WEIGHT: 164 LBS | DIASTOLIC BLOOD PRESSURE: 75 MMHG | HEART RATE: 56 BPM | BODY MASS INDEX: 30.96 KG/M2 | HEIGHT: 61 IN | TEMPERATURE: 98.2 F | OXYGEN SATURATION: 97 % | RESPIRATION RATE: 14 BRPM

## 2021-04-29 DIAGNOSIS — K11.8 MASS OF PAROTID GLAND: ICD-10-CM

## 2021-04-29 DIAGNOSIS — E11.65 TYPE 2 DIABETES MELLITUS WITH HYPERGLYCEMIA, WITHOUT LONG-TERM CURRENT USE OF INSULIN (HCC): ICD-10-CM

## 2021-04-29 DIAGNOSIS — M17.0 PRIMARY OSTEOARTHRITIS OF BOTH KNEES: ICD-10-CM

## 2021-04-29 DIAGNOSIS — N18.31 STAGE 3A CHRONIC KIDNEY DISEASE (HCC): ICD-10-CM

## 2021-04-29 DIAGNOSIS — E78.5 HYPERLIPIDEMIA, UNSPECIFIED HYPERLIPIDEMIA TYPE: ICD-10-CM

## 2021-04-29 DIAGNOSIS — I10 ESSENTIAL HYPERTENSION: Primary | ICD-10-CM

## 2021-04-29 PROCEDURE — G8536 NO DOC ELDER MAL SCRN: HCPCS | Performed by: INTERNAL MEDICINE

## 2021-04-29 PROCEDURE — 1101F PT FALLS ASSESS-DOCD LE1/YR: CPT | Performed by: INTERNAL MEDICINE

## 2021-04-29 PROCEDURE — 1090F PRES/ABSN URINE INCON ASSESS: CPT | Performed by: INTERNAL MEDICINE

## 2021-04-29 PROCEDURE — G8427 DOCREV CUR MEDS BY ELIG CLIN: HCPCS | Performed by: INTERNAL MEDICINE

## 2021-04-29 PROCEDURE — G8417 CALC BMI ABV UP PARAM F/U: HCPCS | Performed by: INTERNAL MEDICINE

## 2021-04-29 PROCEDURE — 99214 OFFICE O/P EST MOD 30 MIN: CPT | Performed by: INTERNAL MEDICINE

## 2021-04-29 PROCEDURE — G8510 SCR DEP NEG, NO PLAN REQD: HCPCS | Performed by: INTERNAL MEDICINE

## 2021-04-29 NOTE — PATIENT INSTRUCTIONS
Knee Arthritis: Exercises Introduction Here are some examples of exercises for you to try. The exercises may be suggested for a condition or for rehabilitation. Start each exercise slowly. Ease off the exercises if you start to have pain. You will be told when to start these exercises and which ones will work best for you. How to do the exercises Knee flexion with heel slide 1. Lie on your back with your knees bent. 2. Slide your heel back by bending your affected knee as far as you can. Then hook your other foot around your ankle to help pull your heel even farther back. 3. Hold for about 6 seconds, then rest for up to 10 seconds. 4. Repeat 8 to 12 times. 5. Switch legs and repeat steps 1 through 4, even if only one knee is sore. StormMQ 1. Sit with your affected leg straight and supported on the floor or a firm bed. Place a small, rolled-up towel under your knee. Your other leg should be bent, with that foot flat on the floor. 2. Tighten the thigh muscles of your affected leg by pressing the back of your knee down into the towel. 3. Hold for about 6 seconds, then rest for up to 10 seconds. 4. Repeat 8 to 12 times. 5. Switch legs and repeat steps 1 through 4, even if only one knee is sore. Straight-leg raises to the front 1. Lie on your back with your good knee bent so that your foot rests flat on the floor. Your affected leg should be straight. Make sure that your low back has a normal curve. You should be able to slip your hand in between the floor and the small of your back, with your palm touching the floor and your back touching the back of your hand. 2. Tighten the thigh muscles in your affected leg by pressing the back of your knee flat down to the floor. Hold your knee straight. 3. Keeping the thigh muscles tight and your leg straight, lift your affected leg up so that your heel is about 12 inches off the floor. Hold for about 6 seconds, then lower slowly.  
4. Relax for up to 10 seconds between repetitions. 5. Repeat 8 to 12 times. 6. Switch legs and repeat steps 1 through 5, even if only one knee is sore. Active knee flexion 1. Lie on your stomach with your knees straight. If your kneecap is uncomfortable, roll up a washcloth and put it under your leg just above your kneecap. 2. Lift the foot of your affected leg by bending the knee so that you bring the foot up toward your buttock. If this motion hurts, try it without bending your knee quite as far. This may help you avoid any painful motion. 3. Slowly move your leg up and down. 4. Repeat 8 to 12 times. 5. Switch legs and repeat steps 1 through 4, even if only one knee is sore. Quadriceps stretch (facedown) 1. Lie flat on your stomach, and rest your face on the floor. 2. Wrap a towel or belt strap around the lower part of your affected leg. Then use the towel or belt strap to slowly pull your heel toward your buttock until you feel a stretch. 3. Hold for about 15 to 30 seconds, then relax your leg against the towel or belt strap. 4. Repeat 2 to 4 times. 5. Switch legs and repeat steps 1 through 4, even if only one knee is sore. Stationary exercise bike 1. If you do not have a stationary exercise bike at home, you can find one to ride at your local health club or community center. 2. Adjust the height of the bike seat so that your knee is slightly bent when your leg is extended downward. If your knee hurts when the pedal reaches the top, you can raise the seat so that your knee does not bend as much. 3. Start slowly. At first, try to do 5 to 10 minutes of cycling with little to no resistance. Then increase your time and the resistance bit by bit until you can do 20 to 30 minutes without pain. 4. If you start to have pain, rest your knee until your pain gets back to the level that is normal for you. Or cycle for less time or with less effort. Follow-up care is a key part of your treatment and safety.  Be sure to make and go to all appointments, and call your doctor if you are having problems. It's also a good idea to know your test results and keep a list of the medicines you take. Where can you learn more? Go to http://www.narvaez.com/ Enter C159 in the search box to learn more about \"Knee Arthritis: Exercises. \" Current as of: November 16, 2020               Content Version: 12.8 © 2006-2021 [employer]. Care instructions adapted under license by [employer] (which disclaims liability or warranty for this information). If you have questions about a medical condition or this instruction, always ask your healthcare professional. [address] any warranty or liability for your use of this information.

## 2021-04-29 NOTE — PROGRESS NOTES
Chief Complaint   Patient presents with    Follow Up Chronic Condition       Pt is a 80y.o. year old female who presents for follow up of her chronic medical problems    Health Maintenance Due   Topic Date Due    COVID-19 Vaccine (1)-done Never done    Shingrix Vaccine Age 50> (1 of 2) Never done    Foot Exam Q1  10/23/2020     Here with grand daughter who she lives with  Memory issues? Nothing more than usual for her age  [de-identified] her own meds with the pill box prepared for her      BP Readings from Last 3 Encounters:   04/29/21 126/75   01/08/21 128/80   08/31/20 130/73       Wt Readings from Last 3 Encounters:   04/29/21 164 lb (74.4 kg)   01/08/21 163 lb 12.8 oz (74.3 kg)   08/31/20 159 lb (72.1 kg)       Lab Results   Component Value Date/Time    Hemoglobin A1c 6.3 (H) 01/08/2021 12:00 AM    Hemoglobin A1c (POC) 5.6 10/23/2019 09:51 AM   not on meds      Lab Results   Component Value Date/Time    Cholesterol, total 150 01/08/2021 12:00 AM    HDL Cholesterol 45 01/08/2021 12:00 AM    LDL, calculated 84 01/08/2021 12:00 AM    LDL, calculated 85 10/23/2019 10:38 AM    VLDL, calculated 21 01/08/2021 12:00 AM    VLDL, calculated 23 10/23/2019 10:38 AM    Triglyceride 116 01/08/2021 12:00 AM    CHOL/HDL Ratio 3.8 09/23/2010 01:08 PM   still on statins      GFR 46 done 1/2021    Still with dizziness-random, goes away on its own    Smiley Maldonado last month-seen at the ER    Was outside a store and did not lift her leg and fell 3/2021-went to ER; used a cane for a few days-does not know how to use it instead/declines walker use she has one at home  Knee Xrays-no fracture but with marked OA      Right preauricular mass-no pain, not getting bigger      ROS:    Pt denies: Wt loss, Fever/Chills, HA, Visual changes, Fatigue, Chest pain, SOB, SALOMON, Abd pain, N/V/D/C, Blood in stool or urine, Edema. Pertinent positive as above in HPI.  All others were negative    Patient Active Problem List   Diagnosis Code    Essential hypertension I10    Osteoporosis without current pathological fracture M81.0    Hyperlipidemia E78.5    Primary osteoarthritis of both knees M17.0    Advanced directives, counseling/discussion Z71.89    Overweight (BMI 25.0-29. 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 180 Tab 1    atorvastatin (LIPITOR) 10 mg tablet Take 1 Tab by mouth daily. 90 Tab 1    aspirin delayed-release 81 mg tablet Take  by mouth daily.  cetirizine (ZYRTEC) 5 mg tablet Take 1 Tab by mouth daily. 30 Tab 0    acetaminophen (TYLENOL) 325 mg tablet Take  by mouth every four (4) hours as needed for Pain. Current Facility-Administered Medications   Medication Dose Route Frequency Provider Last Rate Last Admin    albuterol (PROVENTIL HFA, VENTOLIN HFA, PROAIR HFA) inhaler 2 Puff  2 Puff Inhalation Q4H PRN Marcie Briggs MD           Social History     Tobacco Use   Smoking Status Never Smoker   Smokeless Tobacco Never Used       No Known Allergies    Patient Labs were reviewed: yes      Patient Past Records were reviewed:  yes        Objective:     Vitals:    04/29/21 1105   BP: 126/75   Pulse: (!) 56   Resp: 14   Temp: 98.2 °F (36.8 °C)   TempSrc: Temporal   SpO2: 97%   Weight: 164 lb (74.4 kg)   Height: 5' 1\" (1.549 m)     Body mass index is 30.99 kg/m².     Exam:   Appearance: alert, well appearing,  oriented to person, place, and time, acyanotic, in no respiratory distress and well hydrated. HEENT:  NC/AT, pink conj, anicteric sclerae  Neck:  No cervical lymphadenopathy, no JVD, no thyromegaly, no carotid bruit  Heart:  RRR without M/R/G  Lungs:  CTAB, no rhonchi, rales, or wheezes with good air exchange   Abdomen:  Non-tender, pos bowel sounds, no hepatosplenomegaly  Ext:  No C/C/E    Skin: no rash  Neuro: no lateralizing signs, CNs II-XII intact      Assessment/ Plan:   Diagnoses and all orders for this visit:    1. Essential hypertension-controlled, continue with Metoprolol    2. Hyperlipidemia, unspecified hyperlipidemia type-at goal on Lipitor    3. Type 2 diabetes mellitus with hyperglycemia, without long-term current use of insulin (HCC)-continue to watch diet/off meds    4. Stage 3a chronic kidney disease (HCC)-continue to avoid OTC arthritis meds but may take the Diclofenac gel prn    5. Primary osteoarthritis of both knees-ok to use Diclofenac gel prn    6. Mass of parotid gland-non tender and not enlarging so will continue to observe [place] at her age        Follow-up and Dispositions    · Return in about 3 months (around 7/29/2021) for follow up. I have discussed the diagnosis with the patient and the intended plan as seen in the above orders. The patient has received an After-Visit Summary and questions were answered concerning future plans. Medication Side Effects and Warnings were discussed with patient: yes    Patient verbalized understanding of above instructions.     Shonda Leonard MD  Internal Medicine  [facility]

## 2021-04-29 NOTE — PROGRESS NOTES
1. Have you been to the ER, urgent care clinic since your last visit? Hospitalized since your last visit? [facility]    2. Have you seen or consulted any other health care providers outside of the [address] [place] [address] since your last visit? Include any pap smears or colon screening.  Yes ophthalmology      Health Maintenance Due   Topic Date Due    COVID-19 Vaccine (1) Never done    Shingrix Vaccine Age 50> (1 of 2) Never done    Foot Exam Q1  10/23/2020

## 2021-06-30 DIAGNOSIS — E78.5 HYPERLIPIDEMIA, UNSPECIFIED HYPERLIPIDEMIA TYPE: ICD-10-CM

## 2021-07-01 RX ORDER — ATORVASTATIN CALCIUM 10 MG/1
10 TABLET, FILM COATED ORAL DAILY
Qty: 90 TABLET | Refills: 1 | Status: SHIPPED | OUTPATIENT
Start: 2021-07-01 | End: 2021-11-09

## 2021-09-07 ENCOUNTER — OFFICE VISIT (OUTPATIENT)
Dept: FAMILY MEDICINE CLINIC | Age: 86
End: 2021-09-07
Payer: MEDICARE

## 2021-09-07 VITALS
RESPIRATION RATE: 16 BRPM | BODY MASS INDEX: 30.81 KG/M2 | HEIGHT: 61 IN | HEART RATE: 72 BPM | SYSTOLIC BLOOD PRESSURE: 136 MMHG | OXYGEN SATURATION: 98 % | DIASTOLIC BLOOD PRESSURE: 76 MMHG | WEIGHT: 163.2 LBS | TEMPERATURE: 98.3 F

## 2021-09-07 DIAGNOSIS — E78.5 HYPERLIPIDEMIA, UNSPECIFIED HYPERLIPIDEMIA TYPE: ICD-10-CM

## 2021-09-07 DIAGNOSIS — F02.80 EARLY ONSET ALZHEIMER'S DEMENTIA WITHOUT BEHAVIORAL DISTURBANCE (HCC): Primary | ICD-10-CM

## 2021-09-07 DIAGNOSIS — Z23 ENCOUNTER FOR IMMUNIZATION: ICD-10-CM

## 2021-09-07 DIAGNOSIS — G30.0 EARLY ONSET ALZHEIMER'S DEMENTIA WITHOUT BEHAVIORAL DISTURBANCE (HCC): Primary | ICD-10-CM

## 2021-09-07 DIAGNOSIS — I10 ESSENTIAL HYPERTENSION: ICD-10-CM

## 2021-09-07 DIAGNOSIS — E11.65 TYPE 2 DIABETES MELLITUS WITH HYPERGLYCEMIA, WITHOUT LONG-TERM CURRENT USE OF INSULIN (HCC): ICD-10-CM

## 2021-09-07 LAB — HBA1C MFR BLD HPLC: 5.8 %

## 2021-09-07 PROCEDURE — G8510 SCR DEP NEG, NO PLAN REQD: HCPCS | Performed by: INTERNAL MEDICINE

## 2021-09-07 PROCEDURE — G8427 DOCREV CUR MEDS BY ELIG CLIN: HCPCS | Performed by: INTERNAL MEDICINE

## 2021-09-07 PROCEDURE — G0008 ADMIN INFLUENZA VIRUS VAC: HCPCS | Performed by: INTERNAL MEDICINE

## 2021-09-07 PROCEDURE — G8417 CALC BMI ABV UP PARAM F/U: HCPCS | Performed by: INTERNAL MEDICINE

## 2021-09-07 PROCEDURE — 1101F PT FALLS ASSESS-DOCD LE1/YR: CPT | Performed by: INTERNAL MEDICINE

## 2021-09-07 PROCEDURE — 99214 OFFICE O/P EST MOD 30 MIN: CPT | Performed by: INTERNAL MEDICINE

## 2021-09-07 PROCEDURE — 83036 HEMOGLOBIN GLYCOSYLATED A1C: CPT | Performed by: INTERNAL MEDICINE

## 2021-09-07 PROCEDURE — G8536 NO DOC ELDER MAL SCRN: HCPCS | Performed by: INTERNAL MEDICINE

## 2021-09-07 PROCEDURE — 90694 VACC AIIV4 NO PRSRV 0.5ML IM: CPT | Performed by: INTERNAL MEDICINE

## 2021-09-07 PROCEDURE — 1090F PRES/ABSN URINE INCON ASSESS: CPT | Performed by: INTERNAL MEDICINE

## 2021-09-07 RX ORDER — DONEPEZIL HYDROCHLORIDE 5 MG/1
5 TABLET, FILM COATED ORAL
Qty: 90 TABLET | Refills: 0 | Status: SHIPPED | OUTPATIENT
Start: 2021-09-07 | End: 2021-11-29 | Stop reason: SDUPTHER

## 2021-09-07 NOTE — PATIENT INSTRUCTIONS
Helping A Person With Dementia: Care Instructions  Your Care Instructions     Dementia is a loss of mental skills that affects daily life. It is different from mild memory loss that occurs with aging. Dementia can cause problems with memory, thinking clearly, and planning. It is different for everyone. But it usually gets worse slowly. Some people who have dementia can function well for a long time. But at some point it may become hard for the person to care for himself or herself. It can be upsetting to learn that a loved one has this condition. You may be afraid and worried about what will happen. You may wonder how you will care for the person. There is no cure for dementia. But medicine may be able to slow memory loss and improve thinking for a while. Other medicines may help with sleep, depression, and behavior changes. Dementia is different for everyone. In some cases, people can function well for a long time. You can help your loved one by making his or her home life easier and safer. You also need to take care of yourself. Caregiving can be stressful. But support is available to help you and give you a break when you need it. The Alzheimer's [employer] offers good information and support. If you are caring for someone with dementia, you can help make life safer and more comfortable. You can also help your loved one make decisions about future care. You may also want to bring up legal and financial issues. These are hard but important conversations to have. Follow-up care is a key part of your loved one's treatment and safety. Be sure to make and go to all appointments, and call your doctor if your loved one is having problems. It's also a good idea to know your loved one's test results and keep a list of the medicines he or she takes. How can you care for your loved one at home? Taking care of the person  · If the person takes medicine for dementia, help him or her take it exactly as prescribed. Call the doctor if you notice any problems with the medicine. · Make a list of the person's medicines. Review it with all of his or her doctors. · Help the person eat a balanced diet. Serve plenty of whole grains, fruits, and vegetables every day. If the person is not hungry at mealtimes, give snacks at midmorning and in the afternoon. Offer drinks such as Boost, Ensure, or Sustacal if the person is losing weight. · Encourage exercise. Walking and other activities may slow the decline of mental ability. Help the person stay active mentally with reading, crossword puzzles, or other hobbies. · Talk openly with the doctor about any behavior changes. Many people who have dementia become easily upset or agitated or feel worried. There are many things that can cause this, such as medicine side effects, confusion, and pain. It may be helpful to:  ? Keep distractions to a minimum. It may also help to keep noise levels low and voices quiet. ? Develop simple daily routines for bathing, dressing, and other activities. And remind your loved one often about upcoming changes to the daily routine, such as trips or appointments. ? Ask what is upsetting him or her. Keep in mind that people who have dementia don't always know why they are upset. · Take steps to help if the person is sundowning. This is the restless behavior and trouble with sleeping that may occur in late afternoon and at night. Try not to let the person nap during the day. Offer a glass of warm milk or caffeine-free tea before bedtime. · Be patient. A task may take the person longer than it used to. · For as long as he or she is able, allow your loved one to make decisions about activities, food, clothing, and other choices. Let him or her be independent, even if tasks take more time or are not done perfectly. Tailor tasks to the person's abilities. For example, if cooking is no longer safe, ask for other help.  Your loved one can help set the table, or make simple dishes such as a salad. When the person needs help, offer it gently. Staying safe  · Make your home (or your loved one's home) safe. Tack down rugs, and put no-slip tape in the tub. Install handrails, and put safety switches on stoves and appliances. Keep rooms free of clutter. Make sure walkways around furniture are clear. Do not move furniture around, because the person may become confused. · Use locks on doors and cupboards. Lock up knives, scissors, medicines, cleaning supplies, and other dangerous things. · Do not let the person drive or cook if he or she can't do it safely. A person with dementia should not drive unless he or she is able to pass an on-road driving test. Your state 's license bureau can do a driving test if there is any question. · Get medical alert jewelry for the person so that you can be contacted if he or she wanders away. If possible, provide a safe place for wandering, such as an enclosed yard or garden. Taking care of yourself  · Ask your doctor about support groups and other resources in your area. · Take care of your health. Be sure to eat healthy foods and get enough rest and exercise. · Take time for yourself. Respite services provide someone to stay with the person for a short time while you get out of the house for a few hours. · Make time for an activity that you enjoy. Read, listen to music, paint, do crafts, or play an instrument, even if it's only for a few minutes a day. · Spend time with family, friends, and others in your support system. When should you call for help? Call 911 anytime you think the person may need emergency care. For example, call if:    · The person who has dementia wanders away and you can't find him or her.     · The person who has dementia is seriously injured.    Call the doctor now or seek immediate medical care if:    · The person suddenly sees things that are not there (hallucinates).     · The person has a sudden change in his or her behavior. Watch closely for changes in the person's health, and be sure to contact the doctor if:    · The person has symptoms that could cause injury.     · The person has problems with his or her medicine.     · You need more information to care for a person with dementia.     · You need respite care so you can take a break. Where can you learn more? Go to http://www.gray.com/  Enter B382 in the search box to learn more about \"Helping A Person With Dementia: Care Instructions. \"  Current as of: September 23, 2020               Content Version: 12.8  © 2006-2021 [employer]. Care instructions adapted under license by [employer] (which disclaims liability or warranty for this information). If you have questions about a medical condition or this instruction, always ask your healthcare professional. [address] any warranty or liability for your use of this information.

## 2021-09-07 NOTE — PROGRESS NOTES
Patient seen for routine follow up with concerns of memory loss, family h/o dementia. Daughter reports forgetting to turn stove off, forgetful conversations. 1. Have you been to the ER, urgent care clinic since your last visit? Hospitalized since your last visit? No    2. Have you seen or consulted any other health care providers outside of the [address] [place] [address] since your last visit? Include any pap smears or colon screening. No     Health Maintenance Due   Topic Date Due    Shingrix Vaccine Age 49> (1 of 2) Never done    Foot Exam Q1  10/23/2020       Patient was given VIS for review, consent was obtained and per orders of Dr. Jeovanny Leija, injection of Fluad given by [facility]. Patient observed. No signs nor symptoms of any adverse reactions. Patient tolerated injection well.

## 2021-09-07 NOTE — PROGRESS NOTES
Assessment/ Plan:   Diagnoses and all orders for this visit:    1. Early onset Alzheimer's dementia without behavioral disturbance (HCC)-will start low dose Aricept and inc dose next visit if tolerated  -     TSH 3RD GENERATION; Future  -     VITAMIN B12; Future  -     donepeziL (ARICEPT) 5 mg tablet; Take 1 Tablet by mouth nightly. 2. Type 2 diabetes mellitus with hyperglycemia, without long-term current use of insulin (HCC)-A1C at goal on diet alone  -     AMB POC HEMOGLOBIN A1C  -     CBC WITH AUTOMATED DIFF; Future  -     METABOLIC PANEL, COMPREHENSIVE; Future    3. Essential hypertension-controlled, continue with present med    4. Hyperlipidemia, unspecified hyperlipidemia type-advised to stop Lipitor bec of age  -     LIPID PANEL; Future    5. Encounter for immunization  -     FLU (FLUAD QUAD INFLUENZA VACCINE,QUAD,ADJUVANTED)            Follow-up and Dispositions    · Return in about 3 months (around 12/7/2021) for follow up.                Chief Complaint   Patient presents with    Memory Loss    Follow Up Chronic Condition       Pt is a 80y.o. year old female who presents for follow up of her chronic medical problems/brought by her grand daughter who lives with her bec of recent memory issues    Health Maintenance Due   Topic Date Due    Shingrix Vaccine Age 49> (1 of 2) Never done    Foot Exam Q1  10/23/2020    Flu Vaccine (1)-today 09/01/2021    still needs to get her booster shot of Covid    Wt Readings from Last 3 Encounters:   09/07/21 163 lb 3.2 oz (74 kg)   04/29/21 164 lb (74.4 kg)   01/08/21 163 lb 12.8 oz (74.3 kg)     Memory issues recently-gets forgetful like door locks, stove on and gets very frustrated with it    No incontinence    Still able to feed and bathe herself      Sleeps well at night, does not pace all night      No symptoms of any infection  ROS:    Pt denies: Wt loss, Fever/Chills, HA, Visual changes, Fatigue, Chest pain, SOB, SALOMON, Abd pain, N/V/D/C, Blood in stool or urine, Edema. Pertinent positive as above in HPI. All others were negative    Patient Active Problem List   Diagnosis Code    Essential hypertension I10    Osteoporosis without current pathological fracture M81.0    Hyperlipidemia E78.5    Primary osteoarthritis of both knees M17.0    Advanced directives, counseling/discussion Z71.89    Overweight (BMI 25.0-29. 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 90 Tablet 1    metoprolol tartrate (LOPRESSOR) 25 mg tablet Take 1 Tab by mouth two (2) times a day. 180 Tab 1    aspirin delayed-release 81 mg tablet Take  by mouth daily.  cetirizine (ZYRTEC) 5 mg tablet Take 1 Tab by mouth daily. 30 Tab 0    acetaminophen (TYLENOL) 325 mg tablet Take  by mouth every four (4) hours as needed for Pain.        Current Facility-Administered Medications   Medication Dose Route Frequency Provider Last Rate Last Admin    albuterol (PROVENTIL HFA, VENTOLIN HFA, PROAIR HFA) inhaler 2 Puff  2 Puff Inhalation Q4H PRN Rehana Bartlett MD           Social History     Tobacco Use   Smoking Status Never Smoker   Smokeless Tobacco Never Used       No Known Allergies    Patient Labs were reviewed: yes    Patient Past Records were reviewed: yes      Objective:     Vitals:    09/07/21 1053 09/07/21 1113   BP: (!) 161/76 136/76   Pulse: (!) 49 72   Resp: 16    Temp: 98.3 °F (36.8 °C)    TempSrc: Temporal    SpO2: 98%    Weight: 163 lb 3.2 oz (74 kg) Height: 5' 1\" (1.549 m)      Body mass index is 30.84 kg/m². Exam:   Appearance: alert, well appearing,  oriented to person, place, and time, acyanotic, in no respiratory distress and well hydrated. HEENT:  NC/AT, pink conj, anicteric sclerae  Neck:  No cervical lymphadenopathy, no JVD, no thyromegaly, no carotid bruit  Heart:  RRR without M/R/G  Lungs:  CTAB, no rhonchi, rales, or wheezes with good air exchange   Abdomen:  Non-tender, pos bowel sounds, no hepatosplenomegaly  Ext:  No C/C/E    Skin: no rash  Neuro: no lateralizing signs, CNs II-XII intact    Animal naming-4 in 1 min  Unable to draw clock correctly        I have discussed the diagnosis with the patient and the intended plan as seen in the above orders. The patient has received an After-Visit Summary and questions were answered concerning future plans. Medication Side Effects and Warnings were discussed with patient: yes    Patient verbalized understanding of above instructions.     Lianna Richmond MD  Internal Medicine  [facility]

## 2021-09-08 LAB
ALBUMIN SERPL-MCNC: 4.3 G/DL (ref 3.5–4.6)
ALBUMIN/GLOB SERPL: 1.6 {RATIO} (ref 1.2–2.2)
ALP SERPL-CCNC: 100 IU/L (ref 48–121)
ALT SERPL-CCNC: 9 IU/L (ref 0–32)
AST SERPL-CCNC: 16 IU/L (ref 0–40)
BASOPHILS # BLD AUTO: 0.1 X10E3/UL (ref 0–0.2)
BASOPHILS NFR BLD AUTO: 1 %
BILIRUB SERPL-MCNC: 0.4 MG/DL (ref 0–1.2)
BUN SERPL-MCNC: 14 MG/DL (ref 10–36)
BUN/CREAT SERPL: 14 (ref 12–28)
CALCIUM SERPL-MCNC: 9.4 MG/DL (ref 8.7–10.3)
CHLORIDE SERPL-SCNC: 106 MMOL/L (ref 96–106)
CHOLEST SERPL-MCNC: 147 MG/DL (ref 100–199)
CO2 SERPL-SCNC: 21 MMOL/L (ref 20–29)
CREAT SERPL-MCNC: 0.97 MG/DL (ref 0.57–1)
EOSINOPHIL # BLD AUTO: 0.1 X10E3/UL (ref 0–0.4)
EOSINOPHIL NFR BLD AUTO: 1 %
ERYTHROCYTE [DISTWIDTH] IN BLOOD BY AUTOMATED COUNT: 14.7 % (ref 11.7–15.4)
GLOBULIN SER CALC-MCNC: 2.7 G/DL (ref 1.5–4.5)
GLUCOSE SERPL-MCNC: 76 MG/DL (ref 65–99)
HCT VFR BLD AUTO: 39.4 % (ref 34–46.6)
HDLC SERPL-MCNC: 46 MG/DL
HGB BLD-MCNC: 12.9 G/DL (ref 11.1–15.9)
IMM GRANULOCYTES # BLD AUTO: 0 X10E3/UL (ref 0–0.1)
IMM GRANULOCYTES NFR BLD AUTO: 0 %
IMP & REVIEW OF LAB RESULTS: NORMAL
INTERPRETATION: NORMAL
LDLC SERPL CALC-MCNC: 83 MG/DL (ref 0–99)
LYMPHOCYTES # BLD AUTO: 1.8 X10E3/UL (ref 0.7–3.1)
LYMPHOCYTES NFR BLD AUTO: 34 %
MCH RBC QN AUTO: 26.3 PG (ref 26.6–33)
MCHC RBC AUTO-ENTMCNC: 32.7 G/DL (ref 31.5–35.7)
MCV RBC AUTO: 80 FL (ref 79–97)
MONOCYTES # BLD AUTO: 0.5 X10E3/UL (ref 0.1–0.9)
MONOCYTES NFR BLD AUTO: 9 %
NEUTROPHILS # BLD AUTO: 2.8 X10E3/UL (ref 1.4–7)
NEUTROPHILS NFR BLD AUTO: 55 %
PLATELET # BLD AUTO: 270 X10E3/UL (ref 150–450)
POTASSIUM SERPL-SCNC: 5.1 MMOL/L (ref 3.5–5.2)
PROT SERPL-MCNC: 7 G/DL (ref 6–8.5)
RBC # BLD AUTO: 4.91 X10E6/UL (ref 3.77–5.28)
SODIUM SERPL-SCNC: 145 MMOL/L (ref 134–144)
TRIGL SERPL-MCNC: 96 MG/DL (ref 0–149)
TSH SERPL DL<=0.005 MIU/L-ACNC: 5.06 UIU/ML (ref 0.45–4.5)
VIT B12 SERPL-MCNC: 352 PG/ML (ref 232–1245)
VLDLC SERPL CALC-MCNC: 18 MG/DL (ref 5–40)
WBC # BLD AUTO: 5.2 X10E3/UL (ref 3.4–10.8)

## 2021-09-08 NOTE — PROGRESS NOTES
Pls let patient know labs normal except for slightly abnormal thyroid test, no need for med but will repeat next visit

## 2021-11-08 ENCOUNTER — PATIENT OUTREACH (OUTPATIENT)
Dept: CASE MANAGEMENT | Age: 86
End: 2021-11-08

## 2021-11-08 NOTE — PROGRESS NOTES
Care Transitions Initial Call    Call within 2 business days of discharge: Yes     Patient: Cheryl Pringle Patient : 1929 MRN: [id number]    Was this an external facility discharge? Yes, 21-21 Discharge Facility: [facility] for rectal Bleeding. CTN Attempt to contact patient via telephone on 21 for post  follow up. Unable to reach. Left message on voicemail with office contact information. No Patient medical information left on message. Noted Pt.  Has PCP apt tomorrow 21     [facility] follow up appointment(s):   Future Appointments   Date Time Provider Meredith Francisco   2021 10:30 AM MD ANIBAL Avila BS AMB   2021 10:15 AM MD ANIBAL Avila BS AMB

## 2021-11-09 ENCOUNTER — OFFICE VISIT (OUTPATIENT)
Dept: FAMILY MEDICINE CLINIC | Age: 86
End: 2021-11-09
Payer: MEDICARE

## 2021-11-09 ENCOUNTER — PATIENT OUTREACH (OUTPATIENT)
Dept: CASE MANAGEMENT | Age: 86
End: 2021-11-09

## 2021-11-09 VITALS
DIASTOLIC BLOOD PRESSURE: 75 MMHG | OXYGEN SATURATION: 93 % | TEMPERATURE: 98.2 F | WEIGHT: 159 LBS | HEIGHT: 61 IN | HEART RATE: 53 BPM | BODY MASS INDEX: 30.02 KG/M2 | RESPIRATION RATE: 16 BRPM | SYSTOLIC BLOOD PRESSURE: 127 MMHG

## 2021-11-09 DIAGNOSIS — I10 ESSENTIAL HYPERTENSION: ICD-10-CM

## 2021-11-09 DIAGNOSIS — G30.0 EARLY ONSET ALZHEIMER'S DEMENTIA WITHOUT BEHAVIORAL DISTURBANCE (HCC): ICD-10-CM

## 2021-11-09 DIAGNOSIS — R00.1 BRADYCARDIA: ICD-10-CM

## 2021-11-09 DIAGNOSIS — K57.90 DIVERTICULOSIS: ICD-10-CM

## 2021-11-09 DIAGNOSIS — F02.80 EARLY ONSET ALZHEIMER'S DEMENTIA WITHOUT BEHAVIORAL DISTURBANCE (HCC): ICD-10-CM

## 2021-11-09 DIAGNOSIS — K55.9 ISCHEMIC COLITIS (HCC): Primary | ICD-10-CM

## 2021-11-09 PROCEDURE — 99496 TRANSJ CARE MGMT HIGH F2F 7D: CPT | Performed by: INTERNAL MEDICINE

## 2021-11-09 PROCEDURE — G8427 DOCREV CUR MEDS BY ELIG CLIN: HCPCS | Performed by: INTERNAL MEDICINE

## 2021-11-09 NOTE — PROGRESS NOTES
Patient seen for hospital follow up for GI bleeding. 1. Have you been to the ER, urgent care clinic since your last visit? [facility] your [facility]    2. Have you seen or consulted any other health care providers outside of the [facility] since your last visit? Include any pap smears or colon screening.  No    Health Maintenance Due   Topic Date Due    Shingrix Vaccine Age 49> (1 of 2) Never done    Foot Exam Q1  10/23/2020    COVID-19 Vaccine (3 - Booster for Aparicio Peter series) 10/15/2021    Medicare Yearly Exam  10/22/2021

## 2021-11-09 NOTE — PROGRESS NOTES
Care Transitions Initial Call    Call within 2 business days of discharge: Yes     Patient: Duke Pringle Patient : 1929 MRN: [id number]      Was this an external facility discharge? Yes, 21-21 Discharge Facility: [facility] for rectal Bleeding. Challenges to be reviewed by the provider   Additional needs identified to be addressed with provider: no  none         Method of communication with provider : none      Advance Care Planning:   Does patient have an Advance Directive:  not on file at this time. Inpatient Readmission Risk score: No data recorded  Was this a readmission? no   Patient stated reason for the admission: Rectal Bleeding. Patients top risk factors for readmission: stages of grief and chronic medical condition, recent hospitalization   Interventions to address risk factors: Closely follow up with PCP. Care Transition Nurse (CTN) contacted the patient by telephone to perform post hospital discharge assessment. Verified name and  with patient as identifiers. Provided introduction to self, and explanation of the CTN role. Patient reports that she doesn't feel well today because her sister . CTN offered  condolences to Pt. And Family. No new or worsening of symptoms reported by Pt. At this time. Pt. Attended PCP apt today 21. No questions, concerns and/or needs reported by Pt. At this time. CTN reviewed discharge instructions, medical action plan and red flags with patient who verbalized understanding. Were discharge instructions available to patient? yes. Medication reconciliation was not performed with patient, Pt. Kept the conversation short. Referral to Pharm D needed: Will defer with Pt. PCP. Home Health/Outpatient orders at discharge: refused- Noted Pt.  Declined HH ( per EMR)    Durable Medical Equipment ordered at discharge: None noted    [address] follow up appointment(s):   Future Appointments   Date Time Provider Meredith Francisco   2/9/2022 10:00 AM Taya Cooley MD AMA BS AMB

## 2021-11-09 NOTE — Clinical Note
Hi, Dr Ahsan Yoo- you have seen this patient before for palpitations and had a Holter with some runs of SVT. Had recent hospitalization for ischemic colitis and they noted bradycardia to HR of 30's but would get better with exertion. Do not know why they did not address this during her stay as this could be the reason for the ischemic colitis-low flow state! Anyhow, I put in a referral for her to see you again, I stopped her Metoprolol as well. Do you still do Holter there or should I order it before she sees you?     Thanks again,  Laney Osullivan

## 2021-11-17 ENCOUNTER — PATIENT OUTREACH (OUTPATIENT)
Dept: CASE MANAGEMENT | Age: 86
End: 2021-11-17

## 2021-11-17 NOTE — PROGRESS NOTES
Care Transitions Follow Up Call    Challenges to be reviewed by the provider   Additional needs identified to be addressed with provider: yes    Patient reports that she is doing okay. Pt. Reports feeling tired and episodes of SOB on exertion. Pt. States that SOB on exertion comes and goes. Pt. Denied CP, Fever, chills, lightheadedness, dizziness, leg swellings and visible rectal bleeding at this time. Method of communication with provider : chart routing    Care Transition Nurse (CTN) contacted the patient by telephone to follow up  Verified name and  with patient as identifiers. Patient reports that she is doing well. Pt. Denied CP,  difficulty breathing, fever, chills, lightheadedness , dizziness, leg swelling and S/S of bleeding at this time. Pt. Reports that she gets tired easily. Pt. Reports SOB on exertion comes and goes. No worsening of symptoms reported by Pt. At this time. Reminded Pt. to go to the nearest emergency room for chest pain, shortness of breath, returning of symptoms that brought her to the emergency room and/or worsening of symptoms.  Pt. Verbalized and repeated back understanding    [facility] follow up appointment(s):   Future Appointments   Date Time Provider Meredith Francisco   2022 10:00 AM Lluvia Pina MD [facility] AMB

## 2021-11-29 DIAGNOSIS — G30.0 EARLY ONSET ALZHEIMER'S DEMENTIA WITHOUT BEHAVIORAL DISTURBANCE (HCC): ICD-10-CM

## 2021-11-29 DIAGNOSIS — F02.80 EARLY ONSET ALZHEIMER'S DEMENTIA WITHOUT BEHAVIORAL DISTURBANCE (HCC): ICD-10-CM

## 2021-11-29 RX ORDER — DONEPEZIL HYDROCHLORIDE 5 MG/1
5 TABLET, FILM COATED ORAL
Qty: 90 TABLET | Refills: 0 | Status: SHIPPED | OUTPATIENT
Start: 2021-11-29 | End: 2021-12-30 | Stop reason: SDUPTHER

## 2021-12-07 ENCOUNTER — VIRTUAL VISIT (OUTPATIENT)
Dept: FAMILY MEDICINE CLINIC | Age: 86
End: 2021-12-07
Payer: MEDICARE

## 2021-12-07 DIAGNOSIS — K55.9 ISCHEMIC COLITIS (HCC): ICD-10-CM

## 2021-12-07 DIAGNOSIS — F03.90 DEMENTIA WITHOUT BEHAVIORAL DISTURBANCE, UNSPECIFIED DEMENTIA TYPE: ICD-10-CM

## 2021-12-07 DIAGNOSIS — R00.1 BRADYCARDIA: ICD-10-CM

## 2021-12-07 DIAGNOSIS — E53.8 B12 DEFICIENCY: ICD-10-CM

## 2021-12-07 DIAGNOSIS — D50.9 IRON DEFICIENCY ANEMIA, UNSPECIFIED IRON DEFICIENCY ANEMIA TYPE: ICD-10-CM

## 2021-12-07 DIAGNOSIS — Z00.00 MEDICARE ANNUAL WELLNESS VISIT, SUBSEQUENT: Primary | ICD-10-CM

## 2021-12-07 DIAGNOSIS — E55.9 VITAMIN D DEFICIENCY: ICD-10-CM

## 2021-12-07 DIAGNOSIS — R53.83 FATIGUE, UNSPECIFIED TYPE: ICD-10-CM

## 2021-12-07 DIAGNOSIS — I10 ESSENTIAL HYPERTENSION: ICD-10-CM

## 2021-12-07 PROCEDURE — G8417 CALC BMI ABV UP PARAM F/U: HCPCS | Performed by: INTERNAL MEDICINE

## 2021-12-07 PROCEDURE — 1090F PRES/ABSN URINE INCON ASSESS: CPT | Performed by: INTERNAL MEDICINE

## 2021-12-07 PROCEDURE — G8536 NO DOC ELDER MAL SCRN: HCPCS | Performed by: INTERNAL MEDICINE

## 2021-12-07 PROCEDURE — G8427 DOCREV CUR MEDS BY ELIG CLIN: HCPCS | Performed by: INTERNAL MEDICINE

## 2021-12-07 PROCEDURE — G8432 DEP SCR NOT DOC, RNG: HCPCS | Performed by: INTERNAL MEDICINE

## 2021-12-07 PROCEDURE — 99214 OFFICE O/P EST MOD 30 MIN: CPT | Performed by: INTERNAL MEDICINE

## 2021-12-07 PROCEDURE — 1101F PT FALLS ASSESS-DOCD LE1/YR: CPT | Performed by: INTERNAL MEDICINE

## 2021-12-07 PROCEDURE — G0439 PPPS, SUBSEQ VISIT: HCPCS | Performed by: INTERNAL MEDICINE

## 2021-12-07 NOTE — PATIENT INSTRUCTIONS
Medicare Wellness Visit, Female     The best way to live healthy is to have a lifestyle where you eat a well-balanced diet, exercise regularly, limit alcohol use, and quit all forms of tobacco/nicotine, if applicable. Regular preventive services are another way to keep healthy. Preventive services (vaccines, screening tests, monitoring & exams) can help personalize your care plan, which helps you manage your own care. Screening tests can find health problems at the earliest stages, when they are easiest to treat. [facility] follows the current, evidence-based guidelines published by the [facility] ([facility]) when recommending preventive services for our patients. Because we follow these guidelines, sometimes recommendations change over time as research supports it. (For example, mammograms used to be recommended annually. Even though Medicare will still pay for an annual mammogram, the newer guidelines recommend a mammogram every two years for women of average risk). Of course, you and your doctor may decide to screen more often for some diseases, based on your risk and your co-morbidities (chronic disease you are already diagnosed with). Preventive services for you include:  - Medicare offers their members a free annual wellness visit, which is time for you and your primary care provider to discuss and plan for your preventive service needs. Take advantage of this benefit every year!  -All adults over the age of 72 should receive the recommended pneumonia vaccines. Current USPSTF guidelines recommend a series of two vaccines for the best pneumonia protection.   -All adults should have a flu vaccine yearly and a tetanus vaccine every 10 years.   -All adults age 48 and older should receive the shingles vaccines (series of two vaccines).       -All adults age 38-68 who are overweight should have a diabetes screening test once every three years.   -All adults born between [birth year] and 1965 should be screened once for Hepatitis C.  -Other screening tests and preventive services for persons with diabetes include: an eye exam to screen for diabetic retinopathy, a kidney function test, a foot exam, and stricter control over your cholesterol.   -Cardiovascular screening for adults with routine risk involves an electrocardiogram (ECG) at intervals determined by your doctor.   -Colorectal cancer screenings should be done for adults age 54-65 with no increased risk factors for colorectal cancer. There are a number of acceptable methods of screening for this type of cancer. Each test has its own benefits and drawbacks. Discuss with your doctor what is most appropriate for you during your annual wellness visit. The different tests include: colonoscopy (considered the best screening method), a fecal occult blood test, a fecal DNA test, and sigmoidoscopy.    -A bone mass density test is recommended when a woman turns 65 to screen for osteoporosis. This test is only recommended one time, as a screening. Some providers will use this same test as a disease monitoring tool if you already have osteoporosis. -Breast cancer screenings are recommended every other year for women of normal risk, age 54-69.  -Cervical cancer screenings for women over age 72 are only recommended with certain risk factors.      Here is a list of your current Health Maintenance items (your personalized list of preventive services) with a due date:  Health Maintenance Due   Topic Date Due    Shingles Vaccine (1 of 2) Never done    Diabetic Foot Care  10/23/2020    COVID-19 Vaccine (3 - Booster for Aparicio Peter series) 10/15/2021

## 2021-12-07 NOTE — PROGRESS NOTES
Patient seen virtually with c/o SOB and fatigue. 1. Have you been to the ER, urgent care clinic since your last visit? Hospitalized since your last visit? No    2. Have you seen or consulted any other health care providers outside of the [address] [place] [address] since your last visit? Include any pap smears or colon screening.  No

## 2021-12-07 NOTE — PROGRESS NOTES
Merline Leaven is a 80 y.o. female who was seen by synchronous (real-time) audio-video technology on 12/7/2021 for Follow Up Chronic Condition and Shortness of Breath        Assessment & Plan:   Diagnoses and all orders for this visit:    1. Medicare annual wellness visit, subsequent-see note below    2. Essential hypertension-check BP in office and if elevated, will restart low dose beta blocker  -     METABOLIC PANEL, COMPREHENSIVE; Future    3. Bradycardia-keep appt with Cardio in January    4. Ischemic colitis (HCC)-I reviewed with her grand daughter the biopsy results; I did not think she needs to see GI at this time as they are unlikely to do a follow up colonoscopy with ehr age  -     CBC WITH AUTOMATED DIFF; Future  -     FERRITIN; Future    5. Fatigue, unspecified type/SALOMON-etio? Need to make sure she is no longer having internal bleeding  -     TSH 3RD GENERATION; Future  -     CBC WITH AUTOMATED DIFF; Future  -     VITAMIN D, 25 HYDROXY; Future  -     VITAMIN B12; Future  -     FERRITIN; Future    6. B12 deficiency  -     VITAMIN B12; Future    7. Vitamin D deficiency  -     VITAMIN D, 25 HYDROXY; Future    8. Iron deficiency anemia, unspecified iron deficiency anemia type-recent blood loss from GI bleed  -     FERRITIN; Future    9. 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              712  Subjective:     Health Maintenance Due   Topic Date Due    Shingrix Vaccine Age 49> (1 of 2) Never done    Foot Exam Q1  10/23/2020    COVID-19 Vaccine (3 - Booster for Pfizer series) 10/15/2021    MICROALBUMIN Q1  01/08/2022     Currently off Metoprolol bec of bradycardia reported in the hospital  Home BPs vary-highest in the 150's HR in the 70's      Doing well on low dose Aricept    New issue: shortness of breath with mild exertion      Prior to Admission medications Medication Sig Start Date End Date Taking? Authorizing Provider   donepeziL (ARICEPT) 5 mg tablet Take 1 Tablet by mouth nightly. 11/29/21  Yes Marcie Alberto MD           cetirizine (ZYRTEC) 5 mg tablet Take 1 Tab by mouth daily. 1/6/20  Yes Blade Madrigal NP   acetaminophen (TYLENOL) 325 mg tablet Take  by mouth every four (4) hours as needed for Pain. 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    Type 2 diabetes with nephropathy ([facility] 09/26/2018    CKD (chronic kidney disease) stage 3, GFR 30-59 ml/min ([facility] 05/15/2018    Type 2 diabetes mellitus with hyperglycemia, without long-term current use of insulin ([facility] 02/26/2018    Overweight (BMI 25.0-29.9) 01/09/2018    Advanced directives, counseling/discussion 10/10/2017    Essential hypertension 05/25/2017    Osteoporosis without current pathological fracture 05/25/2017    Hyperlipidemia 05/25/2017    Primary osteoarthritis of both knees 05/25/2017       ROS  Pt denies: Wt loss, Fever/Chills, HA, Visual changes, Fatigue, Chest pain, SOB, Abd pain, N/V/D/C, Blood in stool or urine, Edema. Pertinent positive as above in HPI.  All others were negative  Objective:     Patient-Reported Vitals 10/21/2020   Patient-Reported Weight 162   Patient-Reported Pulse -   Patient-Reported Systolic  516   Patient-Reported Diastolic 74      General: alert, cooperative, no distress   Mental  status: normal mood, behavior, speech, dress, motor activity, and thought processes, able to follow commands   HENT: NCAT   Neck: no visualized mass   Resp: no respiratory distress   Neuro: no gross deficits   Skin: no discoloration or lesions of concern on visible areas   Psychiatric: normal affect, consistent with stated mood, no evidence of hallucinations     Additional exam findings: none      We discussed the expected course, resolution and complications of the diagnosis(es) in detail. Medication risks, benefits, costs, interactions, and alternatives were discussed as indicated. I advised her to contact the office if her condition worsens, changes or fails to improve as anticipated. She expressed understanding with the diagnosis(es) and plan. Jose Michelle, was evaluated through a synchronous (real-time) audio-video encounter. The patient (or guardian if applicable) is aware that this is a billable service. Verbal consent to proceed has been obtained within the past 12 months. The visit was conducted pursuant to the emergency declaration under the [facility], [address] [place] [address] authority and the [employer] and [employer] General Act. Patient identification was verified, and a caregiver was present when appropriate. The patient was located in a state where the provider was credentialed to provide care. Simran Chin MD                    This is the Subsequent Medicare Annual Wellness Exam, performed 12 months or more after the Initial AWV or the last Subsequent AWV    I have reviewed the patient's medical history in detail and updated the computerized patient record. Assessment/Plan   Education and counseling provided:  Are appropriate based on today's review and evaluation  End-of-Life planning (with patient's consent)-primary decision maker verified  Pneumococcal Vaccine-done  Influenza Vaccine-done  Cardiovascular screening blood test-lipids up to date  Screening for glaucoma-eye exam is up to date  Diabetes screening test-FBs up to date  Lab Results   Component Value Date/Time    Hemoglobin A1c 6.3 (H) 01/08/2021 12:00 AM    Hemoglobin A1c (POC) 5.8 09/07/2021 11:10 AM       1.  Medicare annual wellness visit, subsequent    RTC yearly for wellness visit      Depression Risk Factor Screening:     3 most recent PHQ Screens 12/13/2021   Little interest or pleasure in doing things Not at all   Feeling down, depressed, irritable, or hopeless Not at all   Total Score PHQ 2 0       Alcohol Risk Screen    Do you average more than 1 drink per night or more than 7 drinks a week:  No    On any one occasion in the past three months have you have had more than 3 drinks containing alcohol:  No        Functional Ability and Level of Safety:    Hearing: Hearing is good. Activities of Daily Living: The home contains: handrails and grab bars  Patient needs help with:  phone, transportation, housework, managing medications and managing money      Ambulation: wheelchair bound     Fall Risk:  Fall Risk Assessment, last 12 mths 12/13/2021   Able to walk? Yes   Fall in past 12 months? 1   Do you feel unsteady? 0   Are you worried about falling -   Is TUG test greater than 12 seconds? 0   Number of falls in past 12 months -   Fall with injury? -      Abuse Screen:  Patient is not abused       Cognitive Screening    Has your family/caregiver stated any concerns about your memory: yes - on dementia med    Cognitive Screening: not needed today    Health Maintenance Due     Health Maintenance Due   Topic Date Due    Shingrix Vaccine Age 49> (1 of 2) Never done    Foot Exam Q1  10/23/2020    COVID-19 Vaccine (3 - Booster for Pfizer series) 10/15/2021    MICROALBUMIN Q1  01/08/2022       Patient Care Team   Patient Care Team:  Camille House MD as PCP - General (Internal Medicine)  Camille House MD as PCP - [facility] Empaneled Provider  Denisa Rodrigez MD (Ophthalmology)  Petra Beaulieu MD as Physician (Otolaryngology)  Nba Jin, RN as Ambulatory Care Manager    History     Patient Active Problem List   Diagnosis Code    Essential hypertension I10    Osteoporosis without current pathological fracture M81.0    Hyperlipidemia E78.5    Primary osteoarthritis of both knees M17.0    Advanced directives, counseling/discussion Z71.89    Overweight (BMI 25.0-29. 9) E66.3    Type 2 diabetes mellitus with hyperglycemia, without long-term current use of insulin ([facility]) E11.65    CKD (chronic kidney disease) stage 3, GFR 30-59 ml/min ([facility]) N18.30    Type 2 diabetes with nephropathy ([facility]) E11.21    Mass of parotid gland K11.8    Ischemic colitis ([facility] K55.9     Past Medical History:   Diagnosis Date    Diabetes ([facility]     Essential hypertension 5/25/2017    Hyperlipidemia 5/25/2017    Paroxysmal tachycardia ([facility] 9/18/2018    Primary osteoarthritis of both knees       Past Surgical History:   Procedure Laterality Date    HX HYSTERECTOMY       Current Outpatient Medications   Medication Sig Dispense Refill    donepeziL (ARICEPT) 5 mg tablet Take 1 Tablet by mouth nightly. 90 Tablet 0    metoprolol tartrate (LOPRESSOR) 25 mg tablet Take 1 Tab by mouth two (2) times a day. 180 Tab 1    cetirizine (ZYRTEC) 5 mg tablet Take 1 Tab by mouth daily. 30 Tab 0    acetaminophen (TYLENOL) 325 mg tablet Take  by mouth every four (4) hours as needed for Pain.  ergocalciferol (ERGOCALCIFEROL) 1,250 mcg (50,000 unit) capsule Take 1 Capsule by mouth every seven (7) days. 12 Capsule 1    levothyroxine (SYNTHROID) 25 mcg tablet Take 1 Tablet by mouth Daily (before breakfast).  90 Tablet 0     Current Facility-Administered Medications   Medication Dose Route Frequency Provider Last Rate Last Admin    albuterol (PROVENTIL HFA, VENTOLIN HFA, PROAIR HFA) inhaler 2 Puff  2 Puff Inhalation Q4H PRN Marcie Alberto MD         No Known Allergies    Family History   Problem Relation Age of Onset    Kidney Disease Mother     Hypertension Sister     Kidney Disease Brother      Social History     Tobacco Use    Smoking status: Never Smoker    Smokeless tobacco: Never Used   Substance Use Topics    Alcohol use: No       Alba Pringle, who was evaluated through a synchronous (real-time) audio-video encounter, and/or her healthcare decision maker, is aware that it is a billable service, with coverage as determined by her insurance carrier. She provided verbal consent to proceed: Yes, and patient identification was verified. It was conducted pursuant to the emergency declaration under the [address] [place] [address] authority and the [employer] General Act. A caregiver was present when appropriate. Ability to conduct physical exam was limited. I was in the office. The patient was at home.     Brian Nowak MD

## 2021-12-08 ENCOUNTER — CLINICAL SUPPORT (OUTPATIENT)
Dept: FAMILY MEDICINE CLINIC | Age: 86
End: 2021-12-08

## 2021-12-08 ENCOUNTER — APPOINTMENT (OUTPATIENT)
Dept: FAMILY MEDICINE CLINIC | Age: 86
End: 2021-12-08

## 2021-12-08 VITALS
WEIGHT: 164 LBS | RESPIRATION RATE: 16 BRPM | OXYGEN SATURATION: 96 % | DIASTOLIC BLOOD PRESSURE: 100 MMHG | HEART RATE: 67 BPM | BODY MASS INDEX: 30.96 KG/M2 | HEIGHT: 61 IN | SYSTOLIC BLOOD PRESSURE: 148 MMHG

## 2021-12-08 DIAGNOSIS — I10 ESSENTIAL HYPERTENSION: Primary | ICD-10-CM

## 2021-12-08 NOTE — PROGRESS NOTES
Per  instructions patient presents today for a blood pressure check. Patient seated and resting for 15 minutes with both feet flat on the floor. Blood pressure taken and documented. Reported blood pressure to Dr. Adelina Daly.

## 2021-12-09 ENCOUNTER — PATIENT OUTREACH (OUTPATIENT)
Dept: CASE MANAGEMENT | Age: 86
End: 2021-12-09

## 2021-12-09 DIAGNOSIS — E55.9 VITAMIN D DEFICIENCY: ICD-10-CM

## 2021-12-09 DIAGNOSIS — E03.4 HYPOTHYROIDISM DUE TO ACQUIRED ATROPHY OF THYROID: Primary | ICD-10-CM

## 2021-12-09 LAB
25(OH)D3+25(OH)D2 SERPL-MCNC: 20.4 NG/ML (ref 30–100)
ALBUMIN SERPL-MCNC: 4.4 G/DL (ref 3.5–4.6)
ALBUMIN/GLOB SERPL: 1.8 {RATIO} (ref 1.2–2.2)
ALP SERPL-CCNC: 102 IU/L (ref 44–121)
ALT SERPL-CCNC: 8 IU/L (ref 0–32)
AST SERPL-CCNC: 16 IU/L (ref 0–40)
BASOPHILS # BLD AUTO: 0.1 X10E3/UL (ref 0–0.2)
BASOPHILS NFR BLD AUTO: 1 %
BILIRUB SERPL-MCNC: 0.4 MG/DL (ref 0–1.2)
BUN SERPL-MCNC: 7 MG/DL (ref 10–36)
BUN/CREAT SERPL: 6 (ref 12–28)
CALCIUM SERPL-MCNC: 9.1 MG/DL (ref 8.7–10.3)
CHLORIDE SERPL-SCNC: 106 MMOL/L (ref 96–106)
CO2 SERPL-SCNC: 23 MMOL/L (ref 20–29)
CREAT SERPL-MCNC: 1.09 MG/DL (ref 0.57–1)
EOSINOPHIL # BLD AUTO: 0.1 X10E3/UL (ref 0–0.4)
EOSINOPHIL NFR BLD AUTO: 2 %
ERYTHROCYTE [DISTWIDTH] IN BLOOD BY AUTOMATED COUNT: 15 % (ref 11.7–15.4)
FERRITIN SERPL-MCNC: 56 NG/ML (ref 15–150)
GLOBULIN SER CALC-MCNC: 2.4 G/DL (ref 1.5–4.5)
GLUCOSE SERPL-MCNC: 86 MG/DL (ref 65–99)
HCT VFR BLD AUTO: 39.5 % (ref 34–46.6)
HGB BLD-MCNC: 13 G/DL (ref 11.1–15.9)
IMM GRANULOCYTES # BLD AUTO: 0 X10E3/UL (ref 0–0.1)
IMM GRANULOCYTES NFR BLD AUTO: 0 %
INTERPRETATION: NORMAL
LYMPHOCYTES # BLD AUTO: 2.1 X10E3/UL (ref 0.7–3.1)
LYMPHOCYTES NFR BLD AUTO: 43 %
MCH RBC QN AUTO: 26.7 PG (ref 26.6–33)
MCHC RBC AUTO-ENTMCNC: 32.9 G/DL (ref 31.5–35.7)
MCV RBC AUTO: 81 FL (ref 79–97)
MONOCYTES # BLD AUTO: 0.4 X10E3/UL (ref 0.1–0.9)
MONOCYTES NFR BLD AUTO: 8 %
NEUTROPHILS # BLD AUTO: 2.3 X10E3/UL (ref 1.4–7)
NEUTROPHILS NFR BLD AUTO: 46 %
PLATELET # BLD AUTO: 298 X10E3/UL (ref 150–450)
POTASSIUM SERPL-SCNC: 4.2 MMOL/L (ref 3.5–5.2)
PROT SERPL-MCNC: 6.8 G/DL (ref 6–8.5)
RBC # BLD AUTO: 4.86 X10E6/UL (ref 3.77–5.28)
SODIUM SERPL-SCNC: 146 MMOL/L (ref 134–144)
TSH SERPL DL<=0.005 MIU/L-ACNC: 8.12 UIU/ML (ref 0.45–4.5)
VIT B12 SERPL-MCNC: 318 PG/ML (ref 232–1245)
WBC # BLD AUTO: 4.9 X10E3/UL (ref 3.4–10.8)

## 2021-12-09 RX ORDER — LEVOTHYROXINE SODIUM 25 UG/1
25 TABLET ORAL
Qty: 90 TABLET | Refills: 0 | Status: SHIPPED | OUTPATIENT
Start: 2021-12-09 | End: 2022-02-09

## 2021-12-09 RX ORDER — ERGOCALCIFEROL 1.25 MG/1
50000 CAPSULE ORAL
Qty: 12 CAPSULE | Refills: 1 | Status: SHIPPED | OUTPATIENT
Start: 2021-12-09 | End: 2022-02-09

## 2021-12-09 NOTE — PROGRESS NOTES
.Date/Time:  12/9/2021 3:56 PM    Method of communication with patient:phone    [facility] ( [facility]) made out reach to  patient by telephone to offer Complex Case Management  ( CCM). Provided introduction to self, and explanation of the Ambulatory Care . Contact at [phone] anytime Monday thru Friday 08:00-4:30. ACM noted b/p 148/100 12/8/2021, Labs noted will address with PCP at next call if patient answers.

## 2021-12-13 ENCOUNTER — PATIENT OUTREACH (OUTPATIENT)
Dept: CASE MANAGEMENT | Age: 86
End: 2021-12-13

## 2021-12-13 NOTE — PROGRESS NOTES
.  Complex Case Management      Date/Time:  2021 1:38 PM    Method of communication with patient:phone    [address] [facility] ([facility]) contacted the patient by telephone to perform Ambulatory Care Coordination. Verified name and  (PHI) with patient as identifiers. Provided introduction to self, and explanation of the Ambulatory Care Manager's role. Reviewed most recent clinic visit w/ patient who verbalized understanding. Patient given an opportunity to ask questions. Top Challenges reviewed with the patient   1. Patient is compliant with Aricept 5 mg>>> Patient voicing a difference in her thinking. Voicing she has a 80 nd birthday coming up on the 27 of Dec.  2. Voicing her granddaughter is taking good care of her. 3. Patient voicing she is taking her medications as prescribed . 4. Current labs 2021  has been reviewed by Dr Baljit Degroot and has been addressed . The patient agrees to contact the PCP office or the [facility] for questions related to their healthcare. Provided contact information for future reference. Disease Specific:   N/A    Home Health Active: No    DME Active: Yes    Barriers to care? None noted will discuss ACP before closure of Episode. Advance Care Planning:   Does patient have an Advance Directive:  not on file; education provided     Medication(s):   Medication reconciliation was performed with patient, who verbalizes understanding of administration of home medications. There were no barriers to obtaining medications identified at this time. Referral to Pharm D needed: no     Current Outpatient Medications   Medication Sig    ergocalciferol (ERGOCALCIFEROL) 1,250 mcg (50,000 unit) capsule Take 1 Capsule by mouth every seven (7) days.  levothyroxine (SYNTHROID) 25 mcg tablet Take 1 Tablet by mouth Daily (before breakfast).  donepeziL (ARICEPT) 5 mg tablet Take 1 Tablet by mouth nightly.     metoprolol tartrate (LOPRESSOR) 25 mg tablet Take 1 Tab by mouth two (2) times a day.  cetirizine (ZYRTEC) 5 mg tablet Take 1 Tab by mouth daily.  acetaminophen (TYLENOL) 325 mg tablet Take  by mouth every four (4) hours as needed for Pain. Current Facility-Administered Medications   Medication Dose Route Frequency    albuterol (PROVENTIL HFA, VENTOLIN HFA, PROAIR HFA) inhaler 2 Puff  2 Puff Inhalation Q4H PRN       [facility] follow up appointment(s):   Future Appointments   Date Time Provider Meredith Francisco   12/30/2021  9:00 AM Mario Rasmussen MD [facility] BS AMB   2/9/2022 10:00 AM Joanie Whitley MD [facility] BS AMB        Non-[facility] follow up appointment(s): No    Goals Addressed                 This Visit's Progress       General     Follows diet/fluid recommendations and maintains goal weight        HTN  Diet no salt, low cholesterol  Take medications as prescribed  Exercise as tolerated        Safely Perform ADLs Goal Template        Self-schedules and keeps appointments         Takes all medications as ordered        New medication  Aricept 5 mg started 11/29/2021  Vitamin D 50,000 units every 7 days started 12/9 Vit D -25 hydrox 20.4 L Ca+ 9.1  Metoprolol 25 mg twice daily 1B/P 148/100 12/8/2021        .

## 2021-12-19 PROBLEM — F03.90 DEMENTIA WITHOUT BEHAVIORAL DISTURBANCE (HCC): Status: ACTIVE | Noted: 2021-12-19

## 2021-12-20 DIAGNOSIS — I10 ESSENTIAL HYPERTENSION: ICD-10-CM

## 2021-12-20 RX ORDER — METOPROLOL TARTRATE 25 MG/1
25 TABLET, FILM COATED ORAL 2 TIMES DAILY
Qty: 180 TABLET | Refills: 1 | Status: SHIPPED | OUTPATIENT
Start: 2021-12-20 | End: 2021-12-30

## 2021-12-30 ENCOUNTER — OFFICE VISIT (OUTPATIENT)
Dept: CARDIOLOGY CLINIC | Age: 86
End: 2021-12-30
Payer: MEDICARE

## 2021-12-30 ENCOUNTER — HOSPITAL ENCOUNTER (OUTPATIENT)
Dept: LAB | Age: 86
Discharge: HOME | End: 2021-12-30
Payer: MEDICARE

## 2021-12-30 VITALS
OXYGEN SATURATION: 98 % | HEART RATE: 65 BPM | BODY MASS INDEX: 29.29 KG/M2 | DIASTOLIC BLOOD PRESSURE: 71 MMHG | TEMPERATURE: 97.1 F | RESPIRATION RATE: 16 BRPM | WEIGHT: 155 LBS | SYSTOLIC BLOOD PRESSURE: 122 MMHG

## 2021-12-30 DIAGNOSIS — R00.1 BRADYCARDIA: ICD-10-CM

## 2021-12-30 DIAGNOSIS — I10 ESSENTIAL HYPERTENSION: ICD-10-CM

## 2021-12-30 DIAGNOSIS — F02.80 EARLY ONSET ALZHEIMER'S DEMENTIA WITHOUT BEHAVIORAL DISTURBANCE (HCC): ICD-10-CM

## 2021-12-30 DIAGNOSIS — R42 DIZZINESS: ICD-10-CM

## 2021-12-30 DIAGNOSIS — R00.1 BRADYCARDIA: Primary | ICD-10-CM

## 2021-12-30 DIAGNOSIS — G30.0 EARLY ONSET ALZHEIMER'S DEMENTIA WITHOUT BEHAVIORAL DISTURBANCE (HCC): ICD-10-CM

## 2021-12-30 LAB
T4 FREE SERPL-MCNC: 1 NG/DL (ref 0.7–1.5)
TSH SERPL DL<=0.05 MIU/L-ACNC: 8.79 UIU/ML (ref 0.36–3.74)

## 2021-12-30 PROCEDURE — G8510 SCR DEP NEG, NO PLAN REQD: HCPCS | Performed by: INTERNAL MEDICINE

## 2021-12-30 PROCEDURE — 36415 COLL VENOUS BLD VENIPUNCTURE: CPT

## 2021-12-30 PROCEDURE — 99214 OFFICE O/P EST MOD 30 MIN: CPT | Performed by: INTERNAL MEDICINE

## 2021-12-30 PROCEDURE — 1090F PRES/ABSN URINE INCON ASSESS: CPT | Performed by: INTERNAL MEDICINE

## 2021-12-30 PROCEDURE — 1101F PT FALLS ASSESS-DOCD LE1/YR: CPT | Performed by: INTERNAL MEDICINE

## 2021-12-30 PROCEDURE — G8428 CUR MEDS NOT DOCUMENT: HCPCS | Performed by: INTERNAL MEDICINE

## 2021-12-30 PROCEDURE — G8536 NO DOC ELDER MAL SCRN: HCPCS | Performed by: INTERNAL MEDICINE

## 2021-12-30 PROCEDURE — 84439 ASSAY OF FREE THYROXINE: CPT

## 2021-12-30 PROCEDURE — G8417 CALC BMI ABV UP PARAM F/U: HCPCS | Performed by: INTERNAL MEDICINE

## 2021-12-30 RX ORDER — METOPROLOL TARTRATE 25 MG/1
12.5 TABLET, FILM COATED ORAL 2 TIMES DAILY
Qty: 180 TABLET | Refills: 1 | Status: SHIPPED | OUTPATIENT
Start: 2021-12-30

## 2021-12-30 NOTE — PATIENT INSTRUCTIONS
Testing   Echo**call office 3-5 days after testing is completed for results**     New Medication/Medication Changes  Metoprolol refilled    **please allow 24-48 hrs for medication to be escribed to pharmacy** If you need any refills on medications please contact your pharmacy so that the request can be escribed to the provider for review. Labs  TSH    No appointment required for lab work  [address] [facility] [address] [facility], [facility] [address] [address]  Hours are Mon-Fri 7:00 am-3:30 pm    Other  [employer]-will be calling you to confirm your address to send your Heart Monitor. Please allow 7-10 business days for monitor to arrive at your home.   Customer Service for [address]:  [phone]

## 2021-12-30 NOTE — PROGRESS NOTES
Identified pt with two pt identifiers(name and ). Reviewed record in preparation for visit and have obtained necessary documentation. Fadi Amador presents today for   Chief Complaint   Patient presents with    New Patient         Fadi Amador preferred language for health care discussion is english/other. Personal Protective Equipment:   Personal Protective Equipment was used including: mask-surgical and hands-gloves. Patient was placed on no precaution(s). Patient was masked. Precautions:   Patient currently on None  Patient currently roomed with door closed    Is someone accompanying this pt? Yes ddaughter    Is the patient using any DME equipment during OV? no    Depression Screening:  3 most recent PHQ Screens 2021   Little interest or pleasure in doing things Not at all   Feeling down, depressed, irritable, or hopeless Not at all   Total Score PHQ 2 0       Learning Assessment:  Learning Assessment 2017   PRIMARY LEARNER Child(roger)   HIGHEST LEVEL OF EDUCATION - PRIMARY LEARNER  2 YEARS OF COLLEGE   BARRIERS PRIMARY LEARNER NONE   CO-LEARNER CAREGIVER Yes   PRIMARY LANGUAGE ENGLISH    NEED -   LEARNER PREFERENCE PRIMARY DEMONSTRATION   LEARNING SPECIAL TOPICS -   ANSWERED BY Adelina POTTS OTHER       Abuse Screening:  Abuse Screening Questionnaire 2020   Do you ever feel afraid of your partner? N   Are you in a relationship with someone who physically or mentally threatens you? N   Is it safe for you to go home? Y       Fall Risk  Fall Risk Assessment, last 12 mths 2021   Able to walk? Yes   Fall in past 12 months? -   Do you feel unsteady? -   Are you worried about falling -   Is TUG test greater than 12 seconds? -   Number of falls in past 12 months -   Fall with injury? -       Pt currently taking Anticoagulant therapy? no    Coordination of Care:  1. Have you been to the ER, urgent care clinic since your last visit?  Hospitalized since your last visit? yes    2. Have you seen or consulted any other health care providers outside of the [address] [place] [place] [address] since your last visit? Include any pap smears or colon screening. no      Please see Red banners under Allergies and Med Rec to remove outside inquires. All correct information has been verified with patient and added to chart.      Medication's patient's would liked removed has been marked not taking to be removed per Verbal order and read back per Tyrone Gutierrez MD

## 2021-12-30 NOTE — PROGRESS NOTES
[facility]    Sujata Veloz is a 80 y.o. female with diabetes, essential hypertension, hyperlipidemia    Patient was asked to come see me for the cardiology evaluation. She denies any prior history of myocardial infarction or congestive heart failure. She is accompanied with the daughter. Patient is following up for evaluation of bradycardia and occasional dizziness. According to patient and the daughter spell of dizziness is random, without any exertion infrequently. Denies presyncope or syncope. Occasionally dyspnea however not consistent. Denies any chest pain or chest tightness. She is taking metoprolol 12.5 mg twice daily. Denies any nausea, vomiting, abdominal pain, fever, chills, sputum production. No hematuria or other bleeding complaints    Past Medical History:   Diagnosis Date    Diabetes ([facility]     Essential hypertension 5/25/2017    Hyperlipidemia 5/25/2017    [facility] ([facility] 9/18/2018    Primary osteoarthritis of both knees          Past Surgical History:   Procedure Laterality Date    HX HYSTERECTOMY         Current Outpatient Medications   Medication Sig    metoprolol tartrate (LOPRESSOR) 25 mg tablet Take 0.5 Tablets by mouth two (2) times a day.  ergocalciferol (ERGOCALCIFEROL) 1,250 mcg (50,000 unit) capsule Take 1 Capsule by mouth every seven (7) days.  levothyroxine (SYNTHROID) 25 mcg tablet Take 1 Tablet by mouth Daily (before breakfast).  donepeziL (ARICEPT) 5 mg tablet Take 1 Tablet by mouth nightly.  cetirizine (ZYRTEC) 5 mg tablet Take 1 Tab by mouth daily.  acetaminophen (TYLENOL) 325 mg tablet Take  by mouth every four (4) hours as needed for Pain.      Current Facility-Administered Medications   Medication Dose Route Frequency    albuterol (PROVENTIL HFA, VENTOLIN HFA, PROAIR HFA) inhaler 2 Puff  2 Puff Inhalation Q4H PRN       Allergies and Sensitivities:  No Known Allergies    Family History:  Family History   Problem Relation Age of Onset   Evans Kidney Disease Mother     Hypertension Sister     Kidney Disease Brother        Social History:  Social History     Tobacco Use    Smoking status: Never Smoker    Smokeless tobacco: Never Used   Substance Use Topics    Alcohol use: No    Drug use: No     She  reports that she has never smoked. She has never used smokeless tobacco.  She  reports no history of alcohol use. Review of Systems:  Cardiac symptoms as noted above in HPI. All others negative. Denies fatigue, malaise, skin rash, joint pain, blurring vision, photophobia, neck pain, hemoptysis, chronic cough, nausea, vomiting, hematuria, burning micturition, BRBPR, chronic headaches. Physical Exam:  BP Readings from Last 3 Encounters:   12/30/21 122/71   12/08/21 (!) 148/100   11/09/21 127/75         Pulse Readings from Last 3 Encounters:   12/30/21 65   12/08/21 67   11/09/21 (!) 53          Wt Readings from Last 3 Encounters:   12/30/21 70.3 kg (155 lb)   12/08/21 74.4 kg (164 lb)   11/09/21 72.1 kg (159 lb)       Constitutional: Oriented to person, place, and time. HENT: Head: Normocephalic and atraumatic. Neck: No JVD present. Carotid bruit is not appreciated. Cardiovascular: Regular rhythm. No murmur, gallop or rubs appreciated  Lung: Breath sounds normal. No respiratory distress. No ronchi or rales appreciated  Abdominal: No tenderness. No rebound and no guarding. Musculoskeletal: There is no lower extremity edema.  No cynosis    Review of Data  LABS:   Lab Results   Component Value Date/Time    Sodium 146 (H) 12/08/2021 09:12 AM    Potassium 4.2 12/08/2021 09:12 AM    Chloride 106 12/08/2021 09:12 AM    CO2 23 12/08/2021 09:12 AM    Glucose 86 12/08/2021 09:12 AM    BUN 7 (L) 12/08/2021 09:12 AM    Creatinine 1.09 (H) 12/08/2021 09:12 AM     Lipids Latest Ref Rng & Units 9/7/2021 1/8/2021 10/23/2019 1/7/2019 2/26/2018   Chol, Total 100 - 199 mg/dL 147 150 152 162 136   HDL >39 mg/dL 46 45 44 52 53   LDL 0 - 99 mg/dL 83 84 85 91 67   Trig 0 - 149 mg/dL 96 116 115 97 78   Some recent data might be hidden     Lab Results   Component Value Date/Time    ALT (SGPT) 8 12/08/2021 09:12 AM     Lab Results   Component Value Date/Time    Hemoglobin A1c 6.3 (H) 01/08/2021 12:00 AM    Hemoglobin A1c (POC) 5.8 09/07/2021 11:10 AM       EKG    HOLTER (2010)  Braden Rogers was in Sinus Rhythm. The average heart rate, excluding ectopy, was 56 BPM with a minimum of 39 BPM at 01:20 D2 and a maximum of 80 BPM at 09:19 D2. Heart beats, including ectopy, totaled 840344 beats. VENTRICULAR ECTOPICS totaled 71 averaging 1.5 per hour with 71 single, 0 paired, 0 trigeminy and 0 R on T.  SUPRAVENTRICULAR ECTOPICS totaled 43597 averaging 368. 2 per hour ,with 6203 single and 8976 paired beats. Narrow complex tachycardia occurred 160 times. The fastest run was at 143 BPM and occurred at 08:48 D3 with 508 beats. The longest run was 593 beats at 10:13 D2 at a rate of 142 BPM.  PAUSES occurred 24 times, the longest of which was 2.3 seconds at 22:57:05 D1  Patient diary submitted, no symptoms noted. No patient triggered events noted. ECHO (2018)  LEFT VENTRICULAR SIZE, THICKNESS AND SYSTOLIC FUNCTION ARE NORMAL, NO WALL MOTION   ABNORMALITIES. NORMAL DIASTOLIC FUNCTION. CALCULATED EJECTION FRACTION BY MARTINEZ'S BI-PLANE METHOD IS 72%. THICKENING/CALCIFICATION OF   THE MITRAL VALVE LEAFLETS. LOCAL THICKENING OF THE NON CORONARY AORTIC VALVE CUSPS. MILD TRICUSPID REGURGITATION. NORMAL PULMONARY ARTERY PRESSURE OF 19.6 MMHG. ASCENDING AORTA DILATATION 3.6 CM. STRESS TEST (2018)  COMBINED INTERPRETATION:  1. No evidence for reversible myocardial ischemia or infarction. 2. Gated SPECT left ventricular ejection fraction is 64%.     CATHETERIZATION    IMPRESSION & PLAN:  Steve Carranza is 80 y.o. female with multiple medical problem    Hypertension:  Currently she is on Lopressor 12.5 mg twice daily. Blood pressure stable. Continue current medication    Hyperlipidemia:  Currently she is on atorvastatin. Last LDL 85. Continue current medication no side effect    Paroxysmal SVT:  Diagnosed on Holter monitor which was performed in March 2020. Patient is asymptomatic  Patient already on beta-blocker since 2019. Tolerating medication well  We will order echo to make sure she does not have any structural abnormality of the heart    Dizziness:  Needs to rule out bradycardia related events. Very infrequent event. No presyncope or syncope. Discussed with daughter and the patient. HR today 65  Will place event monitor  ECHO to rule out abnormal cardiac structure. This plan was discussed with patient who is in agreement. Thank you for allowing me to participate in patient care. Please feel free to call me if you have any question or concern. Jeramy East MD  Please note: This document has been produced using voice recognition software. Unrecognized errors in transcription may be present.

## 2022-01-03 RX ORDER — DONEPEZIL HYDROCHLORIDE 5 MG/1
5 TABLET, FILM COATED ORAL
Qty: 90 TABLET | Refills: 0 | Status: SHIPPED | OUTPATIENT
Start: 2022-01-03 | End: 2022-02-09 | Stop reason: DRUGHIGH

## 2022-01-31 ENCOUNTER — TELEPHONE (OUTPATIENT)
Dept: CARDIOLOGY CLINIC | Age: 87
End: 2022-01-31

## 2022-02-01 ENCOUNTER — PATIENT OUTREACH (OUTPATIENT)
Dept: CASE MANAGEMENT | Age: 87
End: 2022-02-01
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
## 2022-02-09 ENCOUNTER — OFFICE VISIT (OUTPATIENT)
Dept: FAMILY MEDICINE CLINIC | Age: 87
End: 2022-02-09
Payer: MEDICARE

## 2022-02-09 VITALS
HEIGHT: 61 IN | WEIGHT: 156.6 LBS | DIASTOLIC BLOOD PRESSURE: 84 MMHG | OXYGEN SATURATION: 97 % | TEMPERATURE: 98.2 F | HEART RATE: 56 BPM | BODY MASS INDEX: 29.57 KG/M2 | SYSTOLIC BLOOD PRESSURE: 132 MMHG | RESPIRATION RATE: 16 BRPM

## 2022-02-09 DIAGNOSIS — K55.9 ISCHEMIC COLITIS (HCC): ICD-10-CM

## 2022-02-09 DIAGNOSIS — E11.65 TYPE 2 DIABETES MELLITUS WITH HYPERGLYCEMIA, WITHOUT LONG-TERM CURRENT USE OF INSULIN (HCC): ICD-10-CM

## 2022-02-09 DIAGNOSIS — I47.1 PAROXYSMAL SVT (SUPRAVENTRICULAR TACHYCARDIA) (HCC): ICD-10-CM

## 2022-02-09 DIAGNOSIS — N18.31 STAGE 3A CHRONIC KIDNEY DISEASE (HCC): ICD-10-CM

## 2022-02-09 DIAGNOSIS — E03.9 ACQUIRED HYPOTHYROIDISM: ICD-10-CM

## 2022-02-09 DIAGNOSIS — F03.90 DEMENTIA WITHOUT BEHAVIORAL DISTURBANCE, UNSPECIFIED DEMENTIA TYPE: ICD-10-CM

## 2022-02-09 DIAGNOSIS — I49.9 IRREGULAR HEART RHYTHM: Primary | ICD-10-CM

## 2022-02-09 PROCEDURE — 99214 OFFICE O/P EST MOD 30 MIN: CPT | Performed by: INTERNAL MEDICINE

## 2022-02-09 PROCEDURE — 93000 ELECTROCARDIOGRAM COMPLETE: CPT | Performed by: INTERNAL MEDICINE

## 2022-02-09 PROCEDURE — G8510 SCR DEP NEG, NO PLAN REQD: HCPCS | Performed by: INTERNAL MEDICINE

## 2022-02-09 PROCEDURE — 1101F PT FALLS ASSESS-DOCD LE1/YR: CPT | Performed by: INTERNAL MEDICINE

## 2022-02-09 PROCEDURE — G8417 CALC BMI ABV UP PARAM F/U: HCPCS | Performed by: INTERNAL MEDICINE

## 2022-02-09 PROCEDURE — G8427 DOCREV CUR MEDS BY ELIG CLIN: HCPCS | Performed by: INTERNAL MEDICINE

## 2022-02-09 PROCEDURE — 1090F PRES/ABSN URINE INCON ASSESS: CPT | Performed by: INTERNAL MEDICINE

## 2022-02-09 PROCEDURE — G8536 NO DOC ELDER MAL SCRN: HCPCS | Performed by: INTERNAL MEDICINE

## 2022-02-09 RX ORDER — DONEPEZIL HYDROCHLORIDE 10 MG/1
10 TABLET, FILM COATED ORAL
Qty: 90 TABLET | Refills: 0 | Status: SHIPPED | OUTPATIENT
Start: 2022-02-09 | End: 2022-05-06 | Stop reason: SDUPTHER

## 2022-02-09 NOTE — PROGRESS NOTES
Patient seen for routine follow up without concerns. Reports wearing holter monitor for 30 days due to irregular heart rhythm, ordered by Dr. Gera Blount. 1. Have you been to the ER, urgent care clinic since your last visit? Hospitalized since your last visit? Yes [facility] for rectal bleeding    2. Have you seen or consulted any other health care providers outside of the [address] [place] [address] since your last visit? Include any pap smears or colon screening.  Yes Cardiology for monitoring      Health Maintenance Due   Topic Date Due    Shingrix Vaccine Age 49> (1 of 2) Never done    Foot Exam Q1  10/23/2020    COVID-19 Vaccine (3 - Booster for Pfizer series) 09/15/2021    MICROALBUMIN Q1  01/08/2022

## 2022-02-09 NOTE — Clinical Note
Hi, Dr Millie Bagley review the EKG we did at the office today. I thought she had A fib on my examination of her but the EKG did not show it  She did have the event monitor on that you ordered.     Thanks,  Garland Gordon
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
## 2022-02-10 LAB
ALBUMIN/CREAT UR: 844 MG/G CREAT (ref 0–29)
CREAT UR-MCNC: 101.1 MG/DL
MICROALBUMIN UR-MCNC: 852.8 UG/ML

## 2022-02-10 NOTE — PATIENT INSTRUCTIONS
Cardiac Arrhythmia: Care Instructions  Your Care Instructions     A cardiac arrhythmia is a change in the normal rhythm of the heart. Your heart may beat too fast or too slow or beat with an irregular or skipping rhythm. A change in the heart's rhythm may feel like a really strong heartbeat or a fluttering in your chest. A severe heart rhythm problem can keep the body from getting the blood it needs. This can result in shortness of breath, lightheadedness, and fainting. You may take medicine to treat your condition. Your doctor may recommend a pacemaker or recommend catheter ablation to destroy small parts of the heart that are causing a rhythm problem. Another possible treatment is an implantable cardioverter-defibrillator (ICD). An ICD is a device that gives the heart a shock to return the heart to a normal rhythm. Follow-up care is a key part of your treatment and safety. Be sure to make and go to all appointments, and call your doctor if you are having problems. It's also a good idea to know your test results and keep a list of the medicines you take. How can you care for yourself at home? General care    · Be safe with medicines. Take your medicines exactly as prescribed. Call your doctor if you think you are having a problem with your medicine. You will get more details on the specific medicines your doctor prescribes.     · If you received a pacemaker or an ICD, you will get a fact sheet about it.     · Wear medical alert jewelry that says you have an abnormal heart rhythm. You can buy this at most drugstores. Lifestyle changes    · Eat a heart-healthy diet.     · Stay at a healthy weight. Lose weight if you need to.     · Avoid nicotine, too much alcohol, and illegal drugs (meth, speed, and cocaine). Also, get enough sleep and do not overeat.     · Ask your doctor whether you can take over-the-counter medicines (such as decongestants).  These can make your heart beat fast.     · Talk to your doctor about any limits to activities, such as driving, or tasks where you use power tools or ladders. Activity    · Start light exercise if your doctor says you can. Even a small amount will help you get stronger, have more energy, and manage your stress.     · Get regular exercise. Try for 30 minutes on most days of the week. Ask your doctor what level of exercise is safe for you. If activity is not likely to cause health problems, you probably do not have limits on the type or level of activity that you can do. You may want to walk, swim, bike, or do other activities.     · When you exercise, watch for signs that your heart is working too hard. You are pushing too hard if you cannot talk while you exercise. If you become short of breath or dizzy or have chest pain, sit down and rest.     · Check your pulse daily. Place two fingers on the artery at the palm side of your wrist, in line with your thumb. If your heartbeat seems uneven, talk to your doctor. When should you call for help? Call 911 anytime you think you may need emergency care. For example, call if:    · You have symptoms of sudden heart failure. These may include:  ? Severe trouble breathing. ? A fast or irregular heartbeat. ? Coughing up pink, foamy mucus. ? You passed out.     · You have signs of a stroke. These include:  ? Sudden numbness, paralysis, or weakness in your face, arm, or leg, especially on only one side of your body. ? New problems with walking or balance. ? Sudden vision changes. ? Drooling or slurred speech. ? New problems speaking or understanding simple statements, or feeling confused. ? A sudden, severe headache that is different from past headaches. Call your doctor now or seek immediate medical care if:    · You have new or changed symptoms of heart failure, such as:  ? New or increased shortness of breath. ? New or worse swelling in your legs, ankles, or feet.   ? Sudden weight gain, such as more than 2 to 3 pounds in a day or 5 pounds in a week. (Your doctor may suggest a different range of weight gain.)  ? Feeling dizzy or lightheaded or like you may faint. ? Feeling so tired or weak that you cannot do your usual activities. ? Not sleeping well. Shortness of breath wakes you at night. You need extra pillows to prop yourself up to breathe easier. Watch closely for changes in your health, and be sure to contact your doctor if:    · You do not get better as expected. Where can you learn more? Go to http://www.gray.com/  Enter [id number] in the search box to learn more about \"Cardiac Arrhythmia: Care Instructions. \"  Current as of: April 29, 2021               Content Version: 13.0  © 2006-2021 [employer]. Care instructions adapted under license by [employer] (which disclaims liability or warranty for this information). If you have questions about a medical condition or this instruction, always ask your healthcare professional. [address] any warranty or liability for your use of this information.

## 2022-02-16 NOTE — TELEPHONE ENCOUNTER
Left voicemail to inform Ms. Laughlin FMLA forms has been faxed to [facility] fax [phone]. PROCEDURES:  Total thyroidectomy 16-Feb-2022 17:20:32  Aureliano Jiang  Parathyroid tissue reimplantation 16-Feb-2022 17:21:05  Aureliano Jiang

## 2022-03-01 ENCOUNTER — PATIENT OUTREACH (OUTPATIENT)
Dept: CASE MANAGEMENT | Age: 87
End: 2022-03-01

## 2022-03-07 ENCOUNTER — PATIENT OUTREACH (OUTPATIENT)
Dept: CASE MANAGEMENT | Age: 87
End: 2022-03-07

## 2022-03-07 PROBLEM — K62.5 RECTAL HEMORRHAGE: Status: ACTIVE | Noted: 2021-11-01

## 2022-03-07 NOTE — PROGRESS NOTES
.Complex Case Management  Follow-Up       Date/Time:   3/7/22  1:51 PM      Ambulatory Care Manager ( ACM) contacted patient for Complex Case Management  follow up. Spoke to patient  Introduced self/role and reason for call. Patient reported: She is doing well. Had doctor visit with Dr Ilia Schwarz a few weeks ago. She is due to see heart doctor in about 2 weeks( 3/24/22 Dr Charo Sharma)   Pertinent concerns:  Denies     Specialist appointments since last outreach? No   If so, specialist and date: N/A    Next PCP Appointment: 5/5/22    Medications:     New medications since last outreach: No  Does patient need refills on any medications: No   Medication changes since last outreach (dose adjustments or discontinued meds): Yes  Aricept increased to 10 mg at last visit 2/9/2022      [address]: No    Barriers to care? No  .  Goals Addressed                 This Visit's Progress       General     Follows diet/fluid recommendations and maintains goal weight   On track     HTN  Diet no salt, low cholesterol  Take medications as prescribed  Exercise as tolerated        Safely Perform ADLs Goal Template   On track     Self-schedules and keeps appointments    On track     Takes all medications as ordered   On track     New medication  Aricept 5 mg started 11/29/2021  Vitamin D 50,000 units every 7 days started 12/9 Vit D -25 hydrox 20.4 L Ca+ 9.1  Metoprolol 25 mg twice daily 1B/P 148/100 12/8/2021  3/7/22  ARICEPT increased to 10 mg daily 2/9/22 visit. Patient reminded that there are physicians on call 24 hours a day / 7 days a week (M-F 5pm to 8am and from Friday 5pm until Monday 8a for the weekend) should the patient have questions or concerns.

## 2022-03-18 PROBLEM — K62.5 RECTAL HEMORRHAGE: Status: ACTIVE | Noted: 2021-11-01

## 2022-03-19 PROBLEM — F03.90 DEMENTIA WITHOUT BEHAVIORAL DISTURBANCE (HCC): Status: ACTIVE | Noted: 2021-12-19

## 2022-03-19 PROBLEM — E11.65 TYPE 2 DIABETES MELLITUS WITH HYPERGLYCEMIA, WITHOUT LONG-TERM CURRENT USE OF INSULIN (HCC): Status: ACTIVE | Noted: 2018-02-26

## 2022-03-19 PROBLEM — N18.30 CKD (CHRONIC KIDNEY DISEASE) STAGE 3, GFR 30-59 ML/MIN (HCC): Status: ACTIVE | Noted: 2018-05-15

## 2022-03-19 PROBLEM — Z71.89 ADVANCED DIRECTIVES, COUNSELING/DISCUSSION: Status: ACTIVE | Noted: 2017-10-10

## 2022-03-19 PROBLEM — K55.9 ISCHEMIC COLITIS (HCC): Status: ACTIVE | Noted: 2021-12-07

## 2022-03-19 PROBLEM — M81.0 OSTEOPOROSIS WITHOUT CURRENT PATHOLOGICAL FRACTURE: Status: ACTIVE | Noted: 2017-05-25

## 2022-03-19 PROBLEM — K11.8 MASS OF PAROTID GLAND: Status: ACTIVE | Noted: 2019-01-08

## 2022-03-19 PROBLEM — E66.3 OVERWEIGHT (BMI 25.0-29.9): Status: ACTIVE | Noted: 2018-01-09

## 2022-03-19 PROBLEM — M17.0 PRIMARY OSTEOARTHRITIS OF BOTH KNEES: Status: ACTIVE | Noted: 2017-05-25

## 2022-03-19 PROBLEM — I10 ESSENTIAL HYPERTENSION: Status: ACTIVE | Noted: 2017-05-25

## 2022-03-20 PROBLEM — E11.21 TYPE 2 DIABETES WITH NEPHROPATHY (HCC): Status: ACTIVE | Noted: 2018-09-26

## 2022-03-20 PROBLEM — E78.5 HYPERLIPIDEMIA: Status: ACTIVE | Noted: 2017-05-25

## 2022-03-24 ENCOUNTER — OFFICE VISIT (OUTPATIENT)
Dept: CARDIOLOGY CLINIC | Age: 87
End: 2022-03-24
Payer: MEDICARE

## 2022-03-24 VITALS
DIASTOLIC BLOOD PRESSURE: 84 MMHG | WEIGHT: 157 LBS | RESPIRATION RATE: 16 BRPM | BODY MASS INDEX: 29.66 KG/M2 | SYSTOLIC BLOOD PRESSURE: 122 MMHG | TEMPERATURE: 97.3 F | HEART RATE: 92 BPM

## 2022-03-24 DIAGNOSIS — E78.00 PURE HYPERCHOLESTEROLEMIA: ICD-10-CM

## 2022-03-24 DIAGNOSIS — I47.1 PSVT (PAROXYSMAL SUPRAVENTRICULAR TACHYCARDIA) (HCC): ICD-10-CM

## 2022-03-24 DIAGNOSIS — I10 ESSENTIAL HYPERTENSION WITH GOAL BLOOD PRESSURE LESS THAN 140/90: Primary | ICD-10-CM

## 2022-03-24 PROCEDURE — G8428 CUR MEDS NOT DOCUMENT: HCPCS | Performed by: INTERNAL MEDICINE

## 2022-03-24 PROCEDURE — 99214 OFFICE O/P EST MOD 30 MIN: CPT | Performed by: INTERNAL MEDICINE

## 2022-03-24 PROCEDURE — G8432 DEP SCR NOT DOC, RNG: HCPCS | Performed by: INTERNAL MEDICINE

## 2022-03-24 PROCEDURE — 1090F PRES/ABSN URINE INCON ASSESS: CPT | Performed by: INTERNAL MEDICINE

## 2022-03-24 PROCEDURE — 1101F PT FALLS ASSESS-DOCD LE1/YR: CPT | Performed by: INTERNAL MEDICINE

## 2022-03-24 PROCEDURE — G8536 NO DOC ELDER MAL SCRN: HCPCS | Performed by: INTERNAL MEDICINE

## 2022-03-24 PROCEDURE — G8417 CALC BMI ABV UP PARAM F/U: HCPCS | Performed by: INTERNAL MEDICINE

## 2022-03-24 NOTE — PROGRESS NOTES
[facility]    Imer Hi is a 80 y.o. female with diabetes, essential hypertension, hyperlipidemia    Patient was asked to come see me for the cardiology evaluation. She denies any prior history of myocardial infarction or congestive heart failure. She is accompanied with the daughter. Patient is following up for evaluation of bradycardia and occasional dizziness. Since last visit, the episode of dizziness has resolved. She has undergone echocardiogram and event monitor since last time. She has been having some random episode of dyspnea without any preceding symptoms of palpitation. Denies any chest pain or chest tightness. She is taking metoprolol 12.5 mg twice daily. Denies any nausea, vomiting, abdominal pain, fever, chills, sputum production. No hematuria or other bleeding complaints    Past Medical History:   Diagnosis Date    Diabetes ([facility] [address]     Essential hypertension 5/25/2017    Hyperlipidemia 5/25/2017    Paroxysmal tachycardia ([facility] 9/18/2018    Primary osteoarthritis of both knees          Past Surgical History:   Procedure Laterality Date    HX HYSTERECTOMY         Current Outpatient Medications   Medication Sig    metoprolol tartrate (LOPRESSOR) 25 mg tablet Take 0.5 Tablets by mouth two (2) times a day.  donepeziL (ARICEPT) 10 mg tablet Take 1 Tablet by mouth nightly.  acetaminophen (TYLENOL) 325 mg tablet Take  by mouth every four (4) hours as needed for Pain.      Current Facility-Administered Medications   Medication Dose Route Frequency    albuterol (PROVENTIL HFA, VENTOLIN HFA, PROAIR HFA) inhaler 2 Puff  2 Puff Inhalation Q4H PRN       Allergies and Sensitivities:  No Known Allergies    Family History:  Family History   Problem Relation Age of Onset    Kidney Disease Mother     Hypertension Sister     Kidney Disease Brother        Social History:  Social History     Tobacco Use    Smoking status: Never Smoker    Smokeless tobacco: Never Used   Substance Use Topics    Alcohol use: No    Drug use: No     She  reports that she has never smoked. She has never used smokeless tobacco.  She  reports no history of alcohol use. Review of Systems:  Cardiac symptoms as noted above in HPI. All others negative. Denies fatigue, malaise, skin rash, joint pain, blurring vision, photophobia, neck pain, hemoptysis, chronic cough, nausea, vomiting, hematuria, burning micturition, BRBPR, chronic headaches. Physical Exam:  BP Readings from Last 3 Encounters:   03/24/22 (!) 144/78   02/09/22 132/84   02/01/22 (!) 145/82         Pulse Readings from Last 3 Encounters:   03/24/22 (!) 59   02/09/22 (!) 56   12/30/21 65          Wt Readings from Last 3 Encounters:   03/24/22 71.2 kg (157 lb)   02/09/22 71 kg (156 lb 9.6 oz)   02/01/22 70.3 kg (155 lb)       Constitutional: Oriented to person, place, and time. HENT: Head: Normocephalic and atraumatic. Neck: No JVD present. Carotid bruit is not appreciated. Cardiovascular: Regular rhythm. No murmur, gallop or rubs appreciated  Lung: Breath sounds normal. No respiratory distress. No ronchi or rales appreciated  Abdominal: No tenderness. No rebound and no guarding. Musculoskeletal: There is no lower extremity edema.  No cynosis    Review of Data  LABS:   Lab Results   Component Value Date/Time    Sodium 146 (H) 12/08/2021 09:12 AM    Potassium 4.2 12/08/2021 09:12 AM    Chloride 106 12/08/2021 09:12 AM    CO2 23 12/08/2021 09:12 AM    Glucose 86 12/08/2021 09:12 AM    BUN 7 (L) 12/08/2021 09:12 AM    Creatinine 1.09 (H) 12/08/2021 09:12 AM     Lipids Latest Ref Rng & Units 9/7/2021 1/8/2021 10/23/2019 1/7/2019 2/26/2018   Chol, Total 100 - 199 mg/dL 147 150 152 162 136   HDL >39 mg/dL 46 45 44 52 53   LDL 0 - 99 mg/dL 83 84 85 91 67   Trig 0 - 149 mg/dL 96 116 115 97 78   Some recent data might be hidden     Lab Results   Component Value Date/Time    ALT (SGPT) 8 12/08/2021 09:12 AM     Lab Results   Component Value Date/Time    Hemoglobin A1c 6.3 (H) 01/08/2021 12:00 AM    Hemoglobin A1c (POC) 5.8 09/07/2021 11:10 AM       EKG    HOLTER (2010)  Yamini Cyr was in Sinus Rhythm. The average heart rate, excluding ectopy, was 56 BPM with a minimum of 39 BPM at 01:20 D2 and a maximum of 80 BPM at 09:19 D2. Heart beats, including ectopy, totaled 117079 beats. VENTRICULAR ECTOPICS totaled 71 averaging 1.5 per hour with 71 single, 0 paired, 0 trigeminy and 0 R on T.  SUPRAVENTRICULAR ECTOPICS totaled 65248 averaging 368. 2 per hour ,with 6203 single and 8976 paired beats. Narrow complex tachycardia occurred 160 times. The fastest run was at 143 BPM and occurred at 08:48 D3 with 508 beats. The longest run was 593 beats at 10:13 D2 at a rate of 142 BPM.  PAUSES occurred 24 times, the longest of which was 2.3 seconds at 22:57:05 D1  Patient diary submitted, no symptoms noted. No patient triggered events noted. ECHO (02/2022)  Left Ventricle Left ventricle size is normal. Mild septal thickening. Normal wall motion. Normal left ventricular systolic function with a visually estimated EF of 55 - 60%. Diastolic dysfunction present with normal LV EF. Left Atrium Left atrium is moderately dilated. Left atrial volume index is severely increased (>48 mL/m2). Right Ventricle Right ventricle size is normal. Mildly reduced systolic function. TAPSE is abnormal.   Right Atrium Right atrium size is normal.   Aortic Valve Valve structure is normal. No transvalvular regurgitation. No stenosis. Mitral Valve Mildly thickened leaflets. Mild transvalvular regurgitation. No stenosis. Tricuspid Valve Valve structure is normal. Mild transvalvular regurgitation. No stenosis. Pulmonic Valve Valve structure is normal. Mild transvalvular regurgitation. No stenosis. Pulmonary Artery Pulmonary hypertension not present. Aorta Normal sized annulus, sinus of Valsalva and aortic arch.  Mildly dilated ascending aorta. STRESS TEST (2018)  COMBINED INTERPRETATION:  1. No evidence for reversible myocardial ischemia or infarction. 2. Gated SPECT left ventricular ejection fraction is 64%. CATHETERIZATION    IMPRESSION & PLAN:  Bharati Douglass is 80 y.o. female with multiple medical problem    Hypertension:  /78 today. Currently she is on Lopressor 12.5 mg twice daily. Considering slightly elevated high blood pressure as well as some evidence of PSVT on event monitor, I will increase metoprolol dose to 25 mg in the morning and 12.5 mg in the evening. Discussed with the patient and the daughter regarding possibility of bradycardia related side effect and events. Hyperlipidemia:  Currently she is on atorvastatin. Last LDL 85. Continue current medication no side effect    Paroxysmal SVT:  Diagnosed on Holter monitor which was performed in March 2020. Patient already on beta-blocker since 2019. Tolerating medication well  ECHO 902/2022) , low risk, Normal EF and no major VHD  Event monitor in 02/2022, sinus rhythm with occasional episode of PSVT with heart rate as high as 170 bpm.  Pause up to 2.2 seconds during sleep hours  Considering slightly elevated high blood pressure as well as some evidence of PSVT on event monitor, I will increase metoprolol dose to 25 mg in the morning and 12.5 mg in the evening. Discussed with the patient and the daughter regarding possibility of bradycardia related side effect and events. Dizziness:  No more dizziness since last time. ECHO 902/2022) , low risk, Normal EF and no major VHD  Event monitor in 02/2022, sinus rhythm with occasional episode of PSVT with heart rate as high as 170 bpm.  Pause up to 2.2 seconds during sleep hours      This plan was discussed with patient who is in agreement. Thank you for allowing me to participate in patient care. Please feel free to call me if you have any question or concern.      Mary Senior MD  Please note: This document has been produced using voice recognition software. Unrecognized errors in transcription may be present.

## 2022-03-24 NOTE — PROGRESS NOTES
Identified pt with two pt identifiers(name and ). Reviewed record in preparation for visit and have obtained necessary documentation. Jaki Lowe presents today for   Chief Complaint   Patient presents with    Follow-up     3m       Pt c/o SOB. Manjit Pringle preferred language for health care discussion is english/other. Personal Protective Equipment:   Personal Protective Equipment was used including: mask-surgical and hands-gloves. Patient was placed on no precaution(s). Patient was masked. Precautions:   Patient currently on None  Patient currently roomed with door closed. Is someone accompanying this pt? Yes daughter    Is the patient using any DME equipment during [address]? no    Depression Screening:  3 most recent PHQ Screens 2022   Little interest or pleasure in doing things Not at all   Feeling down, depressed, irritable, or hopeless Several days   Total Score PHQ 2 1       Learning Assessment:  Learning Assessment 2017   PRIMARY LEARNER Child(roger)   HIGHEST LEVEL OF EDUCATION - PRIMARY LEARNER  2 YEARS OF COLLEGE   BARRIERS PRIMARY LEARNER NONE   CO-LEARNER CAREGIVER Yes   PRIMARY LANGUAGE ENGLISH    NEED -   LEARNER PREFERENCE PRIMARY DEMONSTRATION   LEARNING SPECIAL TOPICS -   ANSWERED BY daughterNickolas Credit   RELATIONSHIP OTHER       Abuse Screening:  Abuse Screening Questionnaire 2020   Do you ever feel afraid of your partner? N   Are you in a relationship with someone who physically or mentally threatens you? N   Is it safe for you to go home? Y       Fall Risk  Fall Risk Assessment, last 12 mths 2021   Able to walk? Yes   Fall in past 12 months? -   Do you feel unsteady? -   Are you worried about falling -   Is TUG test greater than 12 seconds? -   Number of falls in past 12 months -   Fall with injury? -       Pt currently taking Anticoagulant therapy? no  Pt currently taking Antiplatelet therapy? no    Coordination of Care:  1.  Have you been to the ER, urgent care clinic since your last visit? Hospitalized since your last visit? no    2. Have you seen or consulted any other health care providers outside of the [address] [place] [address] since your last visit? Include any pap smears or colon screening. no      Please see Red banners under Allergies and Med Rec to remove outside inquires. All correct information has been verified with patient and added to chart.      Medication's patient's would liked removed has been marked not taking to be removed per Verbal order and read back per Юлия Mayo MD

## 2022-03-24 NOTE — LETTER
3/24/2022    Patient: Andreas Olvera   YOB: 1929   Date of Visit: 3/24/2022     Mary Hooker MD  [address]    Dear Mary Hooker MD,      Thank you for referring Ms. Vishnu Carr to [facility] SPECIALIST AT [facility] for evaluation. My notes for this consultation are attached. If you have questions, please do not hesitate to call me. I look forward to following your patient along with you.       Sincerely,    Patrizia Chen MD

## 2022-03-24 NOTE — PATIENT INSTRUCTIONS
New Medication/Medication Changes  Increase metoprolol to 25 mg in the am and take 12.5 mg in the pm   Start aspirin 81 mg daily       **please allow 24-48 hrs for medication to be escribed to pharmacy** If you need any refills on medications please contact your pharmacy so that the request can be escribed to the provider for review.

## 2022-03-25 NOTE — PROGRESS NOTES
.  Patient: Milton Kwan discussed during interdisciplinary rounds 3/1/2022 to optimize plan of care. Patient with a past medical history of   Past Medical History:   Diagnosis Date    Diabetes ([facility] [address]     Essential hypertension 5/25/2017    Hyperlipidemia 5/25/2017    Paroxysmal tachycardia ([facility] [address] 9/18/2018    Primary osteoarthritis of both knees    . In attendance Felix Kelly MD, Kassy Gaines RN, ACM. Recommendations from the team:Family requesting increase in Aricept, has up coming office appointment, Ambulatory  will continue to follow.   Kassy Gaines RN

## 2022-03-28 ENCOUNTER — PATIENT OUTREACH (OUTPATIENT)
Dept: CASE MANAGEMENT | Age: 87
End: 2022-03-28

## 2022-03-28 NOTE — PROGRESS NOTES
.Complex Case Management  Follow-Up       Date/Time:   3/28/22  4:57 PM       Ambulatory Care Manager ( ACM) contacted patient for Complex Case Management  follow up. Spoke to patient and granddaughter, [facility]. Introduced self/role and reason for call. Patient reported:   She is doing well with the change in metoprolol to 25 mg in the AM and 12.5 mg in the evening. Results of Event monitor from 2/22/22 SR with occasional episodes of PSVT with pulse rate up by 2.25 seconds during the sleep cycle. Blood pressure was not at goal 144/78 in office. ACM asked for today's b/p and patient voiced 128/89 pulse 76 loudly when granddaughter was talking to ACM. Patient voicing she is doing well. On the increased memory medication. Pertinent concerns:None       Specialist appointments since last outreach? Yes  If so, specialist and date: 3/24/22 , Dr Demetrius Cho cardiology    Next PCP Appointment: 5/5/2022    Medications:     New medications since last outreach: No  Does patient need refills on any medications: No   Medication changes since last outreach (dose adjustments or discontinued meds): Yes  Metoprolol 25 mg in am and 12.5 in the evening per cardiology, Dr iVjay Weldon: No    Barriers to care? No  .  Goals Addressed                 This Visit's Progress       General     Takes all medications as ordered   On track     New medication  Aricept 5 mg started 11/29/2021  Vitamin D 50,000 units every 7 days started 12/9 Vit D -25 hydrox 20.4 L Ca+ 9.1  Metoprolol 25 mg twice daily 1B/P 148/100 12/8/2021  3/7/22  ARICEPT increased to 10 mg daily 2/9/22 visit.   3/28/22  Metoprolol increased to 25 mg in am and 12.5 in the evening by cardiology due to b/p not at goal .  B/p today 128/89 p 76 ( Educated on low pulse rate with change in medication per Dr Demetrius Cho)                  Patient reminded that there are physicians on call 24 hours a day / 7 days a week (M-F 5pm to 8am and from Friday 5pm until Monday 8a for the weekend) should the patient have questions or concerns.

## 2022-04-05 ENCOUNTER — PATIENT OUTREACH (OUTPATIENT)
Dept: CASE MANAGEMENT | Age: 87
End: 2022-04-05

## 2022-04-05 NOTE — PROGRESS NOTES
.  Patient: Jeffrey Peoples discussed during interdisciplinary rounds 4/5/2022 to optimize plan of care. Patient with a past medical history of   Past Medical History:   Diagnosis Date    Diabetes ([facility] [address]     Essential hypertension 5/25/2017    Hyperlipidemia 5/25/2017    Paroxysmal tachycardia ([facility] [address] 9/18/2018    Primary osteoarthritis of both knees    . In attendance Smooth Cruz MD, Israel Damon RN, ACM. Recommendations from the team:Noted Change in Metoprolol to 25 mg in the am and 12.5 mg in the evening. Monitor pulse rate and blood pressure daily Ambulatory  will continue to follow.   Israel Damon RN

## 2022-04-22 DIAGNOSIS — R00.1 BRADYCARDIA: ICD-10-CM

## 2022-04-22 DIAGNOSIS — R42 DIZZINESS: ICD-10-CM

## 2022-04-29 ENCOUNTER — PATIENT OUTREACH (OUTPATIENT)
Dept: CASE MANAGEMENT | Age: 87
End: 2022-04-29

## 2022-04-29 NOTE — PROGRESS NOTES
.Complex Case Management  Follow-Up       Date/Time:   4/29/22  11:48 AM       Ambulatory Care Manager ( ACM) contacted patient for Complex Case Management  follow up. ACM left message for return call. ACM contacted daughter and patient phone number. Specialist appointments since last outreach? No**   If so, specialist and date: N/A    Next PCP Appointment: 5/5/22    Medications:     New medications since last outreach: No  Does patient need refills on any medications: No   Medication changes since last outreach (dose adjustments or discontinued meds): No      Home Health company: N/A    Barriers to care? None        Patient reminded that there are physicians on call 24 hours a day / 7 days a week (M-F 5pm to 8am and from Friday 5pm until Monday 8a for the weekend) should the patient have questions or concerns.

## 2022-05-05 ENCOUNTER — OFFICE VISIT (OUTPATIENT)
Dept: FAMILY MEDICINE CLINIC | Age: 87
End: 2022-05-05
Payer: MEDICARE

## 2022-05-05 VITALS
WEIGHT: 156.2 LBS | DIASTOLIC BLOOD PRESSURE: 72 MMHG | SYSTOLIC BLOOD PRESSURE: 134 MMHG | TEMPERATURE: 98.2 F | RESPIRATION RATE: 16 BRPM | BODY MASS INDEX: 29.49 KG/M2 | OXYGEN SATURATION: 100 % | HEIGHT: 61 IN | HEART RATE: 53 BPM

## 2022-05-05 DIAGNOSIS — F03.90 DEMENTIA WITHOUT BEHAVIORAL DISTURBANCE, UNSPECIFIED DEMENTIA TYPE: ICD-10-CM

## 2022-05-05 DIAGNOSIS — I47.1 PSVT (PAROXYSMAL SUPRAVENTRICULAR TACHYCARDIA) (HCC): ICD-10-CM

## 2022-05-05 DIAGNOSIS — I10 ESSENTIAL HYPERTENSION: Primary | ICD-10-CM

## 2022-05-05 DIAGNOSIS — R09.89 RUNNY NOSE: ICD-10-CM

## 2022-05-05 DIAGNOSIS — E11.29 TYPE 2 DIABETES MELLITUS WITH MICROALBUMINURIA, WITHOUT LONG-TERM CURRENT USE OF INSULIN (HCC): ICD-10-CM

## 2022-05-05 DIAGNOSIS — N18.31 STAGE 3A CHRONIC KIDNEY DISEASE (HCC): ICD-10-CM

## 2022-05-05 DIAGNOSIS — R06.02 SHORTNESS OF BREATH: ICD-10-CM

## 2022-05-05 DIAGNOSIS — E78.5 HYPERLIPIDEMIA, UNSPECIFIED HYPERLIPIDEMIA TYPE: ICD-10-CM

## 2022-05-05 DIAGNOSIS — R80.9 TYPE 2 DIABETES MELLITUS WITH MICROALBUMINURIA, WITHOUT LONG-TERM CURRENT USE OF INSULIN (HCC): ICD-10-CM

## 2022-05-05 DIAGNOSIS — E03.9 ACQUIRED HYPOTHYROIDISM: ICD-10-CM

## 2022-05-05 LAB — HBA1C MFR BLD HPLC: 5.7 %

## 2022-05-05 PROCEDURE — 3044F HG A1C LEVEL LT 7.0%: CPT | Performed by: INTERNAL MEDICINE

## 2022-05-05 PROCEDURE — 1101F PT FALLS ASSESS-DOCD LE1/YR: CPT | Performed by: INTERNAL MEDICINE

## 2022-05-05 PROCEDURE — G8417 CALC BMI ABV UP PARAM F/U: HCPCS | Performed by: INTERNAL MEDICINE

## 2022-05-05 PROCEDURE — 99214 OFFICE O/P EST MOD 30 MIN: CPT | Performed by: INTERNAL MEDICINE

## 2022-05-05 PROCEDURE — 83036 HEMOGLOBIN GLYCOSYLATED A1C: CPT | Performed by: INTERNAL MEDICINE

## 2022-05-05 PROCEDURE — G8536 NO DOC ELDER MAL SCRN: HCPCS | Performed by: INTERNAL MEDICINE

## 2022-05-05 PROCEDURE — G8427 DOCREV CUR MEDS BY ELIG CLIN: HCPCS | Performed by: INTERNAL MEDICINE

## 2022-05-05 PROCEDURE — 1090F PRES/ABSN URINE INCON ASSESS: CPT | Performed by: INTERNAL MEDICINE

## 2022-05-05 PROCEDURE — G8432 DEP SCR NOT DOC, RNG: HCPCS | Performed by: INTERNAL MEDICINE

## 2022-05-05 RX ORDER — ALBUTEROL SULFATE 90 UG/1
1 AEROSOL, METERED RESPIRATORY (INHALATION)
Qty: 18 G | Refills: 0 | Status: SHIPPED | OUTPATIENT
Start: 2022-05-05 | End: 2022-07-11 | Stop reason: SDUPTHER

## 2022-05-05 NOTE — PROGRESS NOTES
Patient seen for routine follow up, c/o SOB needs Rx for inhaler    Health Maintenance Due   Topic Date Due    Shingrix Vaccine Age 49> (1 of 2) Never done    Foot Exam Q1  10/23/2020    COVID-19 Vaccine (3 - Booster for Pfizer series) 09/15/2021     1. \"Have you been to the ER, urgent care clinic since your last visit? Hospitalized since your last visit? \" No    2. \"Have you seen or consulted any other health care providers outside of the [address] [place] [address] since your last visit? \" Yes Cardiology for holter monitor     3. For patients aged 39-70: Has the patient had a colonoscopy / FIT/ Cologuard? NA - based on age      If the patient is female:    4. For patients aged 41-77: Has the patient had a mammogram within the past 2 years? NA - based on age or sex      11. For patients aged 21-65: Has the patient had a pap smear?  NA - based on age or sex

## 2022-05-05 NOTE — PROGRESS NOTES
Assessment/ Plan:   Diagnoses and all orders for this visit:    1. Essential hypertension-controlled, continue with current dose of Metoprolol    2. PSVT (paroxysmal supraventricular tachycardia) (HCC)-on Metoprolol, Cardio following    3. Type 2 diabetes mellitus with hyperglycemia, without long-term current use of insulin (HCC)-A1c at goal on current meds  -     AMB POC HEMOGLOBIN A1C    4. Acquired hypothyroidism-check TSH next visit and will start low dose RX if this is above 10 since she is already having issues with tachycardia  Lab Results   Component Value Date/Time    TSH 8.79 (H) 12/30/2021 09:52 AM     5. Shortness of breath-advised caregiver to check pulse ox and HR when this happens to see if she is having the SVT during this episodes  -     AMB SUPPLY ORDER  -     albuterol (PROVENTIL HFA, VENTOLIN HFA, PROAIR HFA) 90 mcg/actuation inhaler; Take 1 Puff by inhalation every six (6) hours as needed for Shortness of Breath. 6. Runny nose-will try Atrovent nasal spray  -     ipratropium (ATROVENT) 42 mcg (0.06 %) nasal spray; 1 [place] by Both Nostrils route three (3) times daily. 7. Hyperlipidemia-currently off statin bec of age    6. Dementia-mild-continue with Aricept    9. CKD-renal function stable, continue to avoid OTC NSAIDs  microalb pos so likely from DM as well as aging and HTN    Follow-up and Dispositions    · Return in about 3 months (around 8/5/2022) for follow up.                    Chief Complaint   Patient presents with    Follow Up Chronic Condition    Shortness of Breath       Pt is a 80y.o. year old female who presents for follow up of her chronic medical problems    Health Maintenance Due   Topic Date Due    Shingrix Vaccine Age 49> (1 of 2) Never done    Foot Exam Q1  10/23/2020    COVID-19 Vaccine (3 - Booster for Pfizer series) 09/15/2021      Wt Readings from Last 3 Encounters:   05/05/22 156 lb 3.2 oz (70.9 kg)   03/24/22 157 lb (71.2 kg)   02/09/22 156 lb 9.6 oz (71 kg) BP Readings from Last 3 Encounters:   05/05/22 134/72   03/24/22 122/84   02/09/22 132/84     Saw Cardio 3/2022: note reviewed with grand daughter who is in the medical field  IMPRESSION & PLAN:  Saurabh Penn is 80 y.o. female with multiple medical problem       Hypertension:  /78 today. Currently she is on Lopressor 12.5 mg twice daily. Considering slightly elevated high blood pressure as well as some evidence of PSVT on event monitor, I will increase metoprolol dose to 25 mg in the morning and 12.5 mg in the evening. Discussed with the patient and the daughter regarding possibility of bradycardia related side effect and events.     Hyperlipidemia:  Currently she is on atorvastatin. Last LDL 85. Continue current medication no side effect     Paroxysmal SVT:  Diagnosed on Holter monitor which was performed in March 2020. Patient already on beta-blocker since 2019. Tolerating medication well  ECHO 902/2022) , low risk, Normal EF and no major VHD  Event monitor in 02/2022, sinus rhythm with occasional episode of PSVT with heart rate as high as 170 bpm.  Pause up to 2.2 seconds during sleep hours  Considering slightly elevated high blood pressure as well as some evidence of PSVT on event monitor, I will increase metoprolol dose to 25 mg in the morning and 12.5 mg in the evening. Discussed with the patient and the daughter regarding possibility of bradycardia related side effect and events.     Dizziness:  No more dizziness since last time.    ECHO 902/2022) , low risk, Normal EF and no major VHD  Event monitor in 02/2022, sinus rhythm with occasional episode of PSVT with heart rate as high as 170 bpm.  Pause up to 2.2 seconds during sleep hours      SOB after getting up from bed  Gets panicky per caregiver      Lab Results   Component Value Date/Time    Hemoglobin A1c 6.3 (H) 01/08/2021 12:00 AM    Hemoglobin A1c (POC) 5.7 05/05/2022 11:53 AM           Lab Results   Component Value Date/Time Cholesterol, total 147 09/07/2021 12:00 AM    HDL Cholesterol 46 09/07/2021 12:00 AM    LDL, calculated 83 09/07/2021 12:00 AM    LDL, calculated 85 10/23/2019 10:38 AM    VLDL, calculated 18 09/07/2021 12:00 AM    VLDL, calculated 23 10/23/2019 10:38 AM    Triglyceride 96 09/07/2021 12:00 AM    CHOL/HDL Ratio 3.8 09/23/2010 01:08 PM   not on statin now      ROS:    Pt denies: Wt loss, Fever/Chills, HA, Visual changes, Fatigue, Chest pain, SOB, SALOMON, Abd pain, N/V/D/C, Blood in stool or urine, Edema. Pertinent positive as above in HPI. All others were negative    Patient Active Problem List   Diagnosis Code    Essential hypertension I10    Osteoporosis without current pathological fracture M81.0    Hyperlipidemia E78.5    Primary osteoarthritis of both knees M17.0    Advanced directives, counseling/discussion Z71.89    Overweight (BMI 25.0-29. 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    Ischemic colitis ([facility] [address] K55.9    Dementia without behavioral disturbance ([facility]) F03.90    Rectal hemorrhage K62.5       Past Medical History:   Diagnosis Date    [facility]     Essential hypertension 5/25/2017    Hyperlipidemia 5/25/2017    Paroxys[facility] tachycardia ([facility] 9/18/2018    Primary osteoarthritis of both knees        Current Outpatient Medications   Medication Sig Dispense Refill    donepeziL (ARICEPT) 10 mg tablet Take 1 Tablet by mouth nightly. 90 Tablet 0    metoprolol tartrate (LOPRESSOR) 25 mg tablet Take 0.5 Tablets by mouth two (2) times a day. (Patient taking differently: Take 12.5 mg by mouth two (2) times a day. Takes 1 whole pill during the day and 0.5 tab nightly) 180 Tablet 1    acetaminophen (TYLENOL) 325 mg tablet Take  by mouth every four (4) hours as needed for Pain. Current Facility-Administered Medications   Medication Dose Route Frequency Provider Last Rate Last Admin    albuterol (PROVENTIL HFA, VENTOLIN HFA, PROAIR HFA) inhaler 2 Puff  2 Puff Inhalation Q4H PRN Eula Clarke MD           Social History     Tobacco Use   Smoking Status Never Smoker   Smokeless Tobacco Never Used       No Known Allergies    Patient Labs were reviewed: yes    Patient Past Records were reviewed: yes      Objective:     Vitals:    05/05/22 1143 05/05/22 1219   BP: (!) 150/72 134/72   Pulse: (!) 53    Resp: 16    Temp: 98.2 °F (36.8 °C)    TempSrc: Temporal    SpO2: 100%    Weight: 156 lb 3.2 oz (70.9 kg)    Height: 5' 1\" (1.549 m)      Body mass index is 29.51 kg/m². Exam:   Appearance: alert, well appearing,  oriented to person, place, and time, acyanotic, in no respiratory distress and well hydrated. HEENT:  NC/AT, pink conj, anicteric sclerae  Neck:  No cervical lymphadenopathy, no JVD, no thyromegaly, no carotid bruit  Heart:  RRR without M/R/G  Lungs:  CTAB, no rhonchi, rales, or wheezes with good air exchange   Abdomen:  Non-tender, pos bowel sounds, no hepatosplenomegaly  Ext:  No C/C/E    Skin: no rash  Neuro: no lateralizing signs, CNs II-XII intact            I have discussed the diagnosis with the patient and the intended plan as seen in the above orders. The patient has received an After-Visit Summary and questions were answered concerning future plans. Medication Side Effects and Warnings were discussed with patient: yes    Patient verbalized understanding of above instructions.     Greg Koroma MD  Internal Medicine  [facility]

## 2022-05-06 DIAGNOSIS — F03.90 DEMENTIA WITHOUT BEHAVIORAL DISTURBANCE, UNSPECIFIED DEMENTIA TYPE: ICD-10-CM

## 2022-05-06 RX ORDER — IPRATROPIUM BROMIDE 42 UG/1
1 SPRAY, METERED NASAL 3 TIMES DAILY
Qty: 15 ML | Refills: 1 | Status: SHIPPED | OUTPATIENT
Start: 2022-05-06

## 2022-05-06 RX ORDER — DONEPEZIL HYDROCHLORIDE 10 MG/1
10 TABLET, FILM COATED ORAL
Qty: 90 TABLET | Refills: 1 | Status: SHIPPED | OUTPATIENT
Start: 2022-05-06 | End: 2022-11-03 | Stop reason: SDUPTHER

## 2022-05-10 ENCOUNTER — PATIENT OUTREACH (OUTPATIENT)
Dept: CASE MANAGEMENT | Age: 87
End: 2022-05-10

## 2022-05-10 NOTE — PROGRESS NOTES
.Complex Case Management  Follow-Up       Date/Time:   5/10/22  10:30AM    Ambulatory Care Manager ( ACM) contacted patient for Complex Case Management  follow up. Spoke to daughter Ms Josefina Chaidez [facility])  Introduced self/role and reason for call. Patient reported:   She is doing well. She is tolerating new dose of Metoprolol 25 mg AM and 12.5 in the PM.  No voiced complaints of feeling dizzy or light headed. Pertinent concerns:Denies       Specialist appointments since last outreach? No   If so, specialist and date: N/A    Next PCP Appointment: 8/4/22    Medications:     New medications since last outreach: No   Does patient need refills on any medications: No   Medication changes since last outreach (dose adjustments or discontinued meds): No      Home Health company: No    Barriers to care? None        Patient reminded that there are physicians on call 24 hours a day / 7 days a week (M-F 5pm to 8am and from Friday 5pm until Monday 8a for the weekend) should the patient have questions or concerns.

## 2022-05-12 PROBLEM — I47.1 PSVT (PAROXYSMAL SUPRAVENTRICULAR TACHYCARDIA) (HCC): Status: ACTIVE | Noted: 2018-09-18

## 2022-05-12 PROBLEM — E03.9 ACQUIRED HYPOTHYROIDISM: Status: ACTIVE | Noted: 2022-05-12

## 2022-05-12 PROBLEM — N18.30 CHRONIC RENAL DISEASE, STAGE III (HCC): Status: ACTIVE | Noted: 2022-05-12

## 2022-05-12 PROBLEM — I47.10 PSVT (PAROXYSMAL SUPRAVENTRICULAR TACHYCARDIA): Status: ACTIVE | Noted: 2018-09-18

## 2022-05-25 NOTE — PROGRESS NOTES
Assessment/ Plan:   Diagnoses and all orders for this visit:    1. Ischemic colitis (HCC)-patient is feeling better  -     REFERRAL TO GASTROENTEROLOGY    2. Diverticulosis-discussed ways to avoid constipation  -     REFERRAL TO GASTROENTEROLOGY    3. Bradycardia-sent note to Cardio about possibility of doing another Holter to see if patient needs pacemaker  -     REFERRAL TO CARDIOLOGY    4. Essential hypertension-advised grand daughter to check BP off Metoprolol    5. Early onset Alzheimer's dementia without behavioral disturbance (HCC)-continue with Aricept; will increase dose at next visit if no longer with GI issues        Follow-up and Dispositions    · Return in about 1 month (around 12/9/2021) for follow up, annual wellness.   Routing History                     Chief Complaint   Patient presents with   [facility] Follow Up     GI       Pt is a 80y.o. year old female who presents for follow up of her chronic medical problems    Health Maintenance Due   Topic Date Due    Shingrix Vaccine Age 49> (1 of 2) Never done    Foot Exam Q1  10/23/2020    COVID-19 Vaccine (3 - Booster for Pfizer series) 10/15/2021    Medicare Yearly Exam  10/22/2021     Admitted 11/1-11/5  Med Recon done by our outreach nurses on 11/8  Discharged from the hospital for blood in the stool (dark colored)-dx with ischemic colitis    Here with daughter; patient however lives with grand daughter who is a nurse and we got her in on the visit via phone     Patient says she is tired a lot  SOB with mild exertion      Discharge summary  Reviewed with the family:    Episodes of bradycardia noted also at the hospital but BP was up as they continued her on the Beta blocker (initially started bec of runs of SVT on Holter associated with dizziness)  BP Readings from Last 3 Encounters:   11/09/21 127/75   09/07/21 136/76   04/29/21 126/75   bradycardia noted    Wt Readings from Last 3 Encounters:   11/09/21 159 lb (72.1 kg)   09/07/21 163 lb 3.2 oz (74 kg)   04/29/21 164 lb (74.4 kg)     ROS:    Pt denies: Wt loss, Fever/Chills, HA, Visual changes, Fatigue, Chest pain, SOB, SALOMON, Abd pain, N/V/D/C, Blood in stool or urine, Edema. Pertinent positive as above in HPI. All others were negative    Patient Active Problem List   Diagnosis Code    Essential hypertension I10    Osteoporosis without current pathological fracture M81.0    Hyperlipidemia E78.5    Primary osteoarthritis of both knees M17.0    Advanced directives, counseling/discussion Z71.89    Overweight (BMI 25.0-29. 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 90 Tablet 0    metoprolol tartrate (LOPRESSOR) 25 mg tablet Take 1 Tab by mouth two (2) times a day. 180 Tab 1    cetirizine (ZYRTEC) 5 mg tablet Take 1 Tab by mouth daily. 30 Tab 0    acetaminophen (TYLENOL) 325 mg tablet Take  by mouth every four (4) hours as needed for Pain.        Current Facility-Administered Medications   Medication Dose Route Frequency Provider Last Rate Last Admin    albuterol (PROVENTIL HFA, VENTOLIN HFA, PROAIR HFA) inhaler 2 Puff  2 Puff Inhalation Q4H PRN Bree Rust MD           Social History     Tobacco Use   Smoking Status Never Smoker   Smokeless Tobacco Never Used       No Known Allergies    Patient Labs were reviewed: yes    Patient Past Records were reviewed: yes      Objective:     Vitals:    11/09/21 1058   BP: 127/75   Pulse: (!) 53   Resp: 16   Temp: 98.2 °F (36.8 °C)   TempSrc: Temporal   SpO2: 93%   Weight: 159 lb (72.1 kg)   Height: 5' 1\" (1.549 m)     Body mass index is 30.04 kg/m². Exam:   Appearance: alert, well appearing,  oriented to person, place, and time, acyanotic, in no respiratory distress and well hydrated. HEENT:  NC/AT, pink conj, anicteric sclerae  Neck:  No cervical lymphadenopathy, no JVD, no thyromegaly, no carotid bruit  Heart:  RRR without M/R/G  Lungs:  CTAB, no rhonchi, rales, or wheezes with good air exchange   Abdomen:  Non-tender, pos bowel sounds, no hepatosplenomegaly  Ext:  No C/C/E    Skin: no rash  Neuro: no lateralizing signs, CNs II-XII intact            I have discussed the diagnosis with the patient and the intended plan as seen in the above orders. The patient has received an After-Visit Summary and questions were answered concerning future plans. Medication Side Effects and Warnings were discussed with patient: yes    Patient verbalized understanding of above instructions.     Felipa Staples MD  Internal Medicine  [facility] DISPLAY PLAN FREE TEXT

## 2022-06-06 ENCOUNTER — PATIENT OUTREACH (OUTPATIENT)
Dept: CASE MANAGEMENT | Age: 87
End: 2022-06-06

## 2022-06-06 NOTE — PROGRESS NOTES
.  Patient has graduated from the Complex Case Management  program on 6/6/22. Patient's symptoms are stable at this time. Patient/family has the ability to self-manage. Care management goals have been completed at this time. No further nurse navigator follow up scheduled. Goals Addressed                 This Visit's Progress       General     COMPLETED: Follows diet/fluid recommendations and maintains goal weight        HTN  Diet no salt, low cholesterol  Take medications as prescribed  Exercise as tolerated        COMPLETED: Safely Perform ADLs Goal Template        COMPLETED: Self-schedules and keeps appointments         COMPLETED: Takes all medications as ordered        New medication  Aricept 5 mg started 11/29/2021  Vitamin D 50,000 units every 7 days started 12/9 Vit D -25 hydrox 20.4 L Ca+ 9.1  Metoprolol 25 mg twice daily 1B/P 148/100 12/8/2021  3/7/22  ARICEPT increased to 10 mg daily 2/9/22 visit. 3/28/22  Metoprolol increased to 25 mg in am and 12.5 in the evening by cardiology due to b/p not at goal .  B/p today 128/89 p 76 ( Educated on low pulse rate with change in medication per Dr Yasmin Mcnair)            Pt has nurse navigator's contact information for any further questions, concerns, or needs.   Patients upcoming visits:    Future Appointments   Date Time Provider Meredith Francisco   8/4/2022 11:15 AM MD ANIBAL Muñoz BS AMB   9/29/2022 10:00 AM Brandon Duke MD [facility] BS AMB

## 2022-06-29 ENCOUNTER — OFFICE VISIT (OUTPATIENT)
Dept: FAMILY MEDICINE CLINIC | Age: 87
End: 2022-06-29
Payer: MEDICARE

## 2022-06-29 VITALS
TEMPERATURE: 98.2 F | OXYGEN SATURATION: 97 % | HEIGHT: 61 IN | SYSTOLIC BLOOD PRESSURE: 127 MMHG | WEIGHT: 154.4 LBS | BODY MASS INDEX: 29.15 KG/M2 | DIASTOLIC BLOOD PRESSURE: 67 MMHG | HEART RATE: 55 BPM | RESPIRATION RATE: 16 BRPM

## 2022-06-29 DIAGNOSIS — I10 ESSENTIAL HYPERTENSION: ICD-10-CM

## 2022-06-29 DIAGNOSIS — R21 RASH: Primary | ICD-10-CM

## 2022-06-29 DIAGNOSIS — N18.31 STAGE 3A CHRONIC KIDNEY DISEASE (HCC): ICD-10-CM

## 2022-06-29 PROCEDURE — 1090F PRES/ABSN URINE INCON ASSESS: CPT | Performed by: INTERNAL MEDICINE

## 2022-06-29 PROCEDURE — G8432 DEP SCR NOT DOC, RNG: HCPCS | Performed by: INTERNAL MEDICINE

## 2022-06-29 PROCEDURE — G8417 CALC BMI ABV UP PARAM F/U: HCPCS | Performed by: INTERNAL MEDICINE

## 2022-06-29 PROCEDURE — G8427 DOCREV CUR MEDS BY ELIG CLIN: HCPCS | Performed by: INTERNAL MEDICINE

## 2022-06-29 PROCEDURE — G8536 NO DOC ELDER MAL SCRN: HCPCS | Performed by: INTERNAL MEDICINE

## 2022-06-29 PROCEDURE — 1123F ACP DISCUSS/DSCN MKR DOCD: CPT | Performed by: INTERNAL MEDICINE

## 2022-06-29 PROCEDURE — 99213 OFFICE O/P EST LOW 20 MIN: CPT | Performed by: INTERNAL MEDICINE

## 2022-06-29 RX ORDER — METHYLPREDNISOLONE 4 MG/1
TABLET ORAL
Qty: 1 DOSE PACK | Refills: 0 | Status: SHIPPED | OUTPATIENT
Start: 2022-06-29 | End: 2022-08-24 | Stop reason: ALTCHOICE

## 2022-06-29 RX ORDER — CETIRIZINE HCL 10 MG
10 TABLET ORAL
Qty: 30 TABLET | Refills: 0 | Status: SHIPPED | OUTPATIENT
Start: 2022-06-29

## 2022-06-29 NOTE — PROGRESS NOTES
Assessment/ Plan:   Diagnoses and all orders for this visit:    1. Rash-etio? If it persists, will send to Derm  -     methylPREDNISolone (MEDROL DOSEPACK) 4 mg tablet; As directed in the back  -     cetirizine (ZYRTEC) 10 mg tablet; Take 1 Tablet by mouth daily as needed for Itching. 2. Stage 3a chronic kidney disease (HCC)-continue to avoid OTC NSAIDs    3. Essential hypertension-controlled, continue with present meds        Follow-up and Dispositions    · Return for previous appt. Chief Complaint   Patient presents with    Rash     back and shoulders     Pt is a 80y.o. year old female who presents for an acute visit for the above    Health Maintenance Due   Topic Date Due    Shingrix Vaccine Age 49> (1 of 2) Never done    Foot Exam Q1  10/23/2020      Brought by her caregiver  Rash spreading in the back, very itchy despite OTC Hydrocortisone cream    No new med/food/soap/detergent  No gardening  No other family members with the same isue    No fever    ROS:    Pt denies: Wt loss, Fever/Chills, HA, Visual changes, Fatigue, Chest pain, SOB, SALOMON, Abd pain, N/V/D/C, Blood in stool or urine, Edema. Pertinent positive as above in HPI. All others were negative    Patient Active Problem List   Diagnosis Code    Essential hypertension I10    Osteoporosis without current pathological fracture M81.0    Hyperlipidemia E78.5    Primary osteoarthritis of both knees M17.0    Advanced directives, counseling/discussion Z71.89    Overweight (BMI 25.0-29. 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    Ischemic colitis ([facility] [address] K55.9    Dementia without behavioral disturbance ([facility]) F03.90    Rectal hemorrhage K62.5    Acquired hypothyroidism E03.9    Chronic renal disease, stage III N18.30       Past Medical History:   Diagnosis Date    Acquired hypothyroidism 5/12/2022    Diabetes ([facility]     Essential hypertension 5/25/2017    Hyperlipidemia 5/25/2017    Paroxysmal tachycardia ([facility] 9/18/2018    Primary osteoarthritis of both knees        Current Outpatient Medications   Medication Sig Dispense Refill    donepeziL (ARICEPT) 10 mg tablet Take 1 Tablet by mouth nightly. 90 Tablet 1    ipratropium (ATROVENT) 42 mcg (0.06 %) nasal spray 1 [place] by Both Nostrils route three (3) times daily. 15 mL 1    albuterol (PROVENTIL HFA, VENTOLIN HFA, PROAIR HFA) 90 mcg/actuation inhaler Take 1 Puff by inhalation every six (6) hours as needed for Shortness of Breath. 18 g 0    metoprolol tartrate (LOPRESSOR) 25 mg tablet Take 0.5 Tablets by mouth two (2) times a day. (Patient taking differently: Take 12.5 mg by mouth two (2) times a day. Takes 1 whole pill during the day and 0.5 tab nightly) 180 Tablet 1    acetaminophen (TYLENOL) 325 mg tablet Take  by mouth every four (4) hours as needed for Pain. Social History     Tobacco Use   Smoking Status Never Smoker   Smokeless Tobacco Never Used       No Known Allergies    Patient Labs were reviewed: yes    Patient Past Records were reviewed: yes      Objective:     Vitals:    06/29/22 1205   BP: 127/67   Pulse: (!) 55   Resp: 16   Temp: 98.2 °F (36.8 °C)   TempSrc: Temporal   SpO2: 97%   Weight: 154 lb 6.4 oz (70 kg)   Height: 5' 1\" (1.549 m)     Body mass index is 29.17 kg/m². Exam:   Appearance: alert, well appearing,  oriented to person, place, and time, acyanotic, in no respiratory distress and well hydrated.   HEENT:  NC/AT, pink conj, anicteric sclerae  Neck:  No cervical lymphadenopathy, no JVD, no thyromegaly, no carotid bruit  Heart:  RRR without M/R/G  Lungs:  CTAB, no rhonchi, rales, or wheezes with good air exchange   Abdomen:  Non-tender, pos bowel sounds, no hepatosplenomegaly  Ext:  No C/C/E    Skin: fine papular rash all over the back, no blisters  Neuro: no lateralizing signs, CNs II-XII intact            I have discussed the diagnosis with the patient and the intended plan as seen in the above orders. The patient has received an After-Visit Summary and questions were answered concerning future plans. Medication Side Effects and Warnings were discussed with patient: yes    Patient verbalized understanding of above instructions.     Nuris Ge MD  Internal Medicine  [facility]

## 2022-06-29 NOTE — PATIENT INSTRUCTIONS
Rash: Care Instructions  Your Care Instructions  A rash is any irritation or inflammation of the skin. Rashes have many possible causes, including allergy, infection, illness, heat, and emotional stress. Follow-up care is a key part of your treatment and safety. Be sure to make and go to all appointments, and call your doctor if you are having problems. It's also a good idea to know your test results and keep a list of the medicines you take. How can you care for yourself at home? · Wash the area with water only. Soap can make dryness and itching worse. Pat dry. · Put cold, wet cloths on the rash to reduce itching. · Keep cool, and stay out of the sun. · Leave the rash open to the air as much of the time as possible. · Sometimes petroleum jelly (Vaseline) can help relieve the discomfort caused by a rash. A moisturizing lotion, such as Cetaphil, also may help. Calamine lotion may help for rashes caused by contact with something (such as a plant or soap) that irritated the skin. Use it 3 or 4 times a day. · If your doctor prescribed a cream, use it as directed. If your doctor prescribed medicine, take it exactly as directed. · If itching affects your sleep, ask your doctor if you can take an antihistamine that might reduce itching and make you sleepy, such as diphenhydramine (Benadryl). Be safe with medicines. Read and follow all instructions on the label. When should you call for help? Call your doctor now or seek immediate medical care if:    · You have signs of infection, such as:  ? Increased pain, swelling, warmth, or redness. ? Red streaks leading from the area. ? Pus draining from the area. ? A fever.     · You have joint pain along with the rash. Watch closely for changes in your health, and be sure to contact your doctor if:    · Your rash is changing or getting worse.  For example, call if you have pain along with the rash, the rash is spreading, or you have new blisters.     · You do not get better after 1 week. Where can you learn more? Go to http://www.gray.com/  Enter U711 in the search box to learn more about \"Rash: Care Instructions. \"  Current as of: November 15, 2021               Content Version: 13.2  © 8820-7298 [employer]. Care instructions adapted under license by [employer] (which disclaims liability or warranty for this information). If you have questions about a medical condition or this instruction, always ask your healthcare professional. [address] any warranty or liability for your use of this information.

## 2022-06-29 NOTE — PROGRESS NOTES
Patient presents with itchy red rash on back and shoulders x1wk, using hydrocortisone as needed, denies pain. No new medication taken    Health Maintenance Due   Topic Date Due    Shingrix Vaccine Age 49> (1 of 2) Never done    Foot Exam Q1  10/23/2020    COVID-19 Vaccine (3 - Booster for Pfizer series) 09/15/2021     1. \"Have you been to the ER, urgent care clinic since your last visit? Hospitalized since your last visit? \" No    2. \"Have you seen or consulted any other health care providers outside of the [address] [place] [address] since your last visit? \" No     3. For patients aged 39-70: Has the patient had a colonoscopy / FIT/ Cologuard? NA - based on age      If the patient is female:    4. For patients aged 41-77: Has the patient had a mammogram within the past 2 years? NA - based on age or sex      11. For patients aged 21-65: Has the patient had a pap smear?  NA - based on age or sex

## 2022-07-11 DIAGNOSIS — R06.02 SHORTNESS OF BREATH: ICD-10-CM

## 2022-07-12 RX ORDER — ALBUTEROL SULFATE 90 UG/1
1 AEROSOL, METERED RESPIRATORY (INHALATION)
Qty: 18 G | Refills: 0 | Status: SHIPPED | OUTPATIENT
Start: 2022-07-12

## 2022-08-18 DIAGNOSIS — K52.9 COLITIS: Primary | ICD-10-CM

## 2022-08-24 ENCOUNTER — OFFICE VISIT (OUTPATIENT)
Dept: FAMILY MEDICINE CLINIC | Age: 87
End: 2022-08-24
Payer: MEDICARE

## 2022-08-24 VITALS
DIASTOLIC BLOOD PRESSURE: 65 MMHG | WEIGHT: 152.4 LBS | SYSTOLIC BLOOD PRESSURE: 103 MMHG | HEIGHT: 61 IN | TEMPERATURE: 98.2 F | OXYGEN SATURATION: 98 % | HEART RATE: 61 BPM | BODY MASS INDEX: 28.77 KG/M2 | RESPIRATION RATE: 16 BRPM

## 2022-08-24 DIAGNOSIS — R09.89 RUNNY NOSE: ICD-10-CM

## 2022-08-24 DIAGNOSIS — F03.90 DEMENTIA WITHOUT BEHAVIORAL DISTURBANCE, UNSPECIFIED DEMENTIA TYPE: ICD-10-CM

## 2022-08-24 DIAGNOSIS — I10 ESSENTIAL HYPERTENSION: ICD-10-CM

## 2022-08-24 DIAGNOSIS — A09 INFECTIOUS COLITIS: Primary | ICD-10-CM

## 2022-08-24 PROCEDURE — G8432 DEP SCR NOT DOC, RNG: HCPCS | Performed by: INTERNAL MEDICINE

## 2022-08-24 PROCEDURE — G8417 CALC BMI ABV UP PARAM F/U: HCPCS | Performed by: INTERNAL MEDICINE

## 2022-08-24 PROCEDURE — 1101F PT FALLS ASSESS-DOCD LE1/YR: CPT | Performed by: INTERNAL MEDICINE

## 2022-08-24 PROCEDURE — G8427 DOCREV CUR MEDS BY ELIG CLIN: HCPCS | Performed by: INTERNAL MEDICINE

## 2022-08-24 PROCEDURE — G8536 NO DOC ELDER MAL SCRN: HCPCS | Performed by: INTERNAL MEDICINE

## 2022-08-24 PROCEDURE — 1123F ACP DISCUSS/DSCN MKR DOCD: CPT | Performed by: INTERNAL MEDICINE

## 2022-08-24 PROCEDURE — 1090F PRES/ABSN URINE INCON ASSESS: CPT | Performed by: INTERNAL MEDICINE

## 2022-08-24 PROCEDURE — 99214 OFFICE O/P EST MOD 30 MIN: CPT | Performed by: INTERNAL MEDICINE

## 2022-08-24 RX ORDER — MONTELUKAST SODIUM 10 MG/1
10 TABLET ORAL DAILY
Qty: 30 TABLET | Refills: 3 | Status: SHIPPED | OUTPATIENT
Start: 2022-08-24

## 2022-08-24 RX ORDER — AZITHROMYCIN 500 MG/1
500 TABLET, FILM COATED ORAL DAILY
Qty: 3 TABLET | Refills: 0 | Status: SHIPPED | OUTPATIENT
Start: 2022-08-24 | End: 2022-08-27

## 2022-08-24 NOTE — PROGRESS NOTES
Patient seen for ED follow up, presented with bloody stool and abdominal pain dx with Colitis, prescribed Augmentin. Reports today with diarrhea and upset stomach, denies pain or blood in stool. Health Maintenance Due   Topic Date Due    Shingrix Vaccine Age 49> (1 of 2) Never done    Foot Exam Q1  10/23/2020     1. \"Have you been to the ER, urgent care clinic since your last visit? Hospitalized since your last visit? \" Yes [facility] for bloody stool and abd pain    2. \"Have you seen or consulted any other health care providers outside of the [address] [place] [address] since your last visit? \" No     3. For patients aged 39-70: Has the patient had a colonoscopy / FIT/ Cologuard? NA - based on age      If the patient is female:    4. For patients aged 41-77: Has the patient had a mammogram within the past 2 years? NA - based on age or sex      11. For patients aged 21-65: Has the patient had a pap smear?  NA - based on age or sex

## 2022-08-24 NOTE — PROGRESS NOTES
Assessment/ Plan:   Diagnoses and all orders for this visit:    1. Infectious colitis based on Ct results from ED (unlikely to be ischemic colitis or diverticulitis); diarrhea is likely from the Augmentin-advised to stop the Augmentin, go on BRAT diet for a few days and take Pedialyte to replenish loses and avoid dehydration  Will change antibiotic to Zithromycin  GI referral prev ordered after she came from the ER  -     azithromycin (ZITHROMAX) 500 mg tab; Take 1 Tablet by mouth daily for 3 days. 2. Runny nose-stop the nasal spray as it is not effective per caregiver  -     montelukast (SINGULAIR) 10 mg tablet; Take 1 Tablet by mouth daily. 3. Essential hypertension-controlled, continue with current regimen  No longer reporting dizzy spells per caregiver    4. Dementia without behavioral disturbance, unspecified dementia type (HCC)-advised to stop the Aricept for a few days while on the Zithromycin bec of possible side effects      Follow-up and Dispositions    Return for previous appt.                    Chief Complaint   Patient presents with    Follow-up     ED for colitis       Pt is a 80y.o. year old female who presents for follow up of her chronic medical problems    Health Maintenance Due   Topic Date Due    Shingrix Vaccine Age 49> (1 of 2) Never done    Foot Exam Q1  10/23/2020      Still with diarrhea but nonbloody  Denies dizziness    ER note reviewed:  [id number] [facility]    Time of Arrival: 08/17/22 0411    Final diagnoses:   [K52.9] Colitis (Primary)   [K62.5] Rectal bleeding   [Z87.19] History of ischemic colitis   [Z86.39] History of diabetes mellitus     Assessment/Differential Diagnosis:     GI bleed, malignancy, vaginal bleeding, UTI, hematuria, anemia, constipation, hemorrhoid, infection    ED Course/Medical Decision Making:   Patient here for generalized abdominal pain as well as blood in the toilet and clear if this is urinary, vaginal, rectal  On arrival, patient is overall well-appearing with reassuring vitals. She has no melena on her rectal exam and no vaginal bleeding on her pelvic exam.  Will proceed with labs, urine, CT scan of the abdomen    CBC notable for mild leukocytosis, 11.2. Unclear significance. Hgb WNL. UA shows trace leukocyte esterase. No blood. Sent for culture. Will not treat at this time. CT scan probable infectious colitis. Ischemic colitis thought to be less likely. I spoke at length with the pt and daughter regarding results. Given patient's history of ischemic colitis, I recommended admission for further work-up and for observation for her rectal bleeding. The patient is adamant that she does not want to be admitted and states \"God will handle it. \"  The patient, the daughter, and I discussed at length the risks of going home with possible ischemic colitis including death. The patient understands and would like to be discharged. I wrote a prescription for Augmentin for suspected infectious colitis. Strict return precautions were given. Patient verbalizes understanding and is in agreement, all questions and concerns were addressed by me. Pt stable for discharge home at this time. Admitted for same reason on 11/2021-  Hematochezia  Colonoscopy 11/3/2021 diverticulosis, segmental inflammation proximal sigmoid colon and distal descending colon secondary to ischemic colitis, internal hemorrhoids      Wt Readings from Last 3 Encounters:   08/24/22 152 lb 6.4 oz (69.1 kg)   06/29/22 154 lb 6.4 oz (70 kg)   05/05/22 156 lb 3.2 oz (70.9 kg)        BP Readings from Last 3 Encounters:   08/24/22 103/65   06/29/22 127/67   05/05/22 134/72       Abdomen -- Computed Tomography   Pelvis -- --     Impression        Colitis LEFT colon, probably infectious. Diverticulitis or ischemic colitis less likely. Diverticulosis coli.      Stable bilobed hepatic cyst. RIGHT renal cyst.      Azithromycin can be given as a single 1 g dose (for patients without dysentery) or as 500 mg once daily for three days. Appropriate fluoroquinolones include ciprofloxacin (a single 750 mg dose or 500 mg twice daily for three to five days) and levofloxacin (500 mg as a single dose or given once daily for three to five days). ROS:    Pt denies: Wt loss, Fever/Chills, HA, Visual changes, Fatigue, Chest pain, SOB, SALOMON, Abd pain, N/V/D/C, Blood in stool or urine, Edema. Pertinent positive as above in HPI. All others were negative    Patient Active Problem List   Diagnosis Code    Essential hypertension I10    Osteoporosis without current pathological fracture M81.0    Hyperlipidemia E78.5    Primary osteoarthritis of both knees M17.0    Advanced directives, counseling/discussion Z71.89    Overweight (BMI 25.0-29. 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    Ischemic colitis ([facility] K55.9    Dementia without behavioral disturbance ([facility] F03.90    Rectal hemorrhage K62.5    Acquired hypothyroidism E03.9    Chronic renal disease, stage III N18.30       Past Medical History:   Diagnosis Date    Acquired hypothyroidism 5/12/2022    Diabetes ([facility]     Essential hypertension 5/25/2017    Hyperlipidemia 5/25/2017    Paroxysmal tachycardia ([facility] 9/18/2018    Primary osteoarthritis of both knees        Current Outpatient Medications   Medication Sig Dispense Refill    albuterol (PROVENTIL HFA, VENTOLIN HFA, PROAIR HFA) 90 mcg/actuation inhaler Take 1 Puff by inhalation every six (6) hours as needed for Shortness of Breath. 18 g 0    cetirizine (ZYRTEC) 10 mg tablet Take 1 Tablet by mouth daily as needed for Itching. 30 Tablet 0    donepeziL (ARICEPT) 10 mg tablet Take 1 Tablet by mouth nightly.  90 Tablet 1    ipratropium (ATROVENT) 42 mcg (0.06 %) nasal spray 1 [place] by Both Nostrils route three (3) times daily. 15 mL 1    metoprolol tartrate (LOPRESSOR) 25 mg tablet Take 0.5 Tablets by mouth two (2) times a day. (Patient taking differently: Take 12.5 mg by mouth two (2) times a day. Takes 1 whole pill during the day and 0.5 tab nightly) 180 Tablet 1    acetaminophen (TYLENOL) 325 mg tablet Take  by mouth every four (4) hours as needed for Pain. methylPREDNISolone (MEDROL DOSEPACK) 4 mg tablet As directed in the back (Patient not taking: Reported on 8/24/2022) 1 Dose Pack 0       Social History     Tobacco Use   Smoking Status Never   Smokeless Tobacco Never       No Known Allergies    Patient Labs were reviewed: yes    Patient Past Records were reviewed: yes      Objective:     Vitals:    08/24/22 0931   BP: 103/65   Pulse: 61   Resp: 16   Temp: 98.2 °F (36.8 °C)   TempSrc: Temporal   SpO2: 98%   Weight: 152 lb 6.4 oz (69.1 kg)   Height: 5' 1\" (1.549 m)     Body mass index is 28.8 kg/m². Exam:   Appearance: alert, well appearing,  oriented to person, place, and time, acyanotic, in no respiratory distress and well hydrated. HEENT:  NC/AT, pink conj, anicteric sclerae  Neck:  No cervical lymphadenopathy, no JVD, no thyromegaly, no carotid bruit  Heart:  RRR without M/R/G  Lungs:  CTAB, no rhonchi, rales, or wheezes with good air exchange   Abdomen:  Non-tender, hyperactive bowel sounds, no hepatosplenomegaly  Ext:  No C/C/E    Skin: no rash  Neuro: no lateralizing signs, CNs II-XII intact            I have discussed the diagnosis with the patient and the intended plan as seen in the above orders. The patient has received an After-Visit Summary and questions were answered concerning future plans. Medication Side Effects and Warnings were discussed with patient: yes    Patient verbalized understanding of above instructions.     Estephania Cueto MD  Internal Medicine  [facility]

## 2022-09-29 ENCOUNTER — OFFICE VISIT (OUTPATIENT)
Dept: CARDIOLOGY CLINIC | Age: 87
End: 2022-09-29
Payer: MEDICARE

## 2022-09-29 VITALS
TEMPERATURE: 97.5 F | DIASTOLIC BLOOD PRESSURE: 78 MMHG | HEART RATE: 59 BPM | SYSTOLIC BLOOD PRESSURE: 122 MMHG | BODY MASS INDEX: 28.72 KG/M2 | OXYGEN SATURATION: 96 % | WEIGHT: 152 LBS

## 2022-09-29 DIAGNOSIS — I47.1 PSVT (PAROXYSMAL SUPRAVENTRICULAR TACHYCARDIA) (HCC): ICD-10-CM

## 2022-09-29 DIAGNOSIS — I10 ESSENTIAL HYPERTENSION WITH GOAL BLOOD PRESSURE LESS THAN 140/90: Primary | ICD-10-CM

## 2022-09-29 PROCEDURE — 1090F PRES/ABSN URINE INCON ASSESS: CPT | Performed by: INTERNAL MEDICINE

## 2022-09-29 PROCEDURE — G8417 CALC BMI ABV UP PARAM F/U: HCPCS | Performed by: INTERNAL MEDICINE

## 2022-09-29 PROCEDURE — 99213 OFFICE O/P EST LOW 20 MIN: CPT | Performed by: INTERNAL MEDICINE

## 2022-09-29 PROCEDURE — G8536 NO DOC ELDER MAL SCRN: HCPCS | Performed by: INTERNAL MEDICINE

## 2022-09-29 PROCEDURE — 1123F ACP DISCUSS/DSCN MKR DOCD: CPT | Performed by: INTERNAL MEDICINE

## 2022-09-29 PROCEDURE — G8428 CUR MEDS NOT DOCUMENT: HCPCS | Performed by: INTERNAL MEDICINE

## 2022-09-29 PROCEDURE — 1101F PT FALLS ASSESS-DOCD LE1/YR: CPT | Performed by: INTERNAL MEDICINE

## 2022-09-29 PROCEDURE — G8432 DEP SCR NOT DOC, RNG: HCPCS | Performed by: INTERNAL MEDICINE

## 2022-09-29 RX ORDER — ASPIRIN 81 MG/1
81 TABLET ORAL DAILY
COMMUNITY

## 2022-09-29 NOTE — PROGRESS NOTES
[facility]    Umesh Goodson is a 80 y.o. female with diabetes, essential hypertension, hyperlipidemia    Patient was asked to come see me for follow-up appointment for her hypertension and SVT  Patient is accompanied with a family member  Denies any symptoms to suggest angina or heart failure. Denies any significant palpitation affecting lifestyle. Able to perform day-to-day activity and walk around the house without any limitation. .    Denies any nausea, vomiting, abdominal pain, fever, chills, sputum production. No hematuria or other bleeding complaints    Past Medical History:   Diagnosis Date    Acquired hypothyroidism 5/12/2022    Diabetes ([facility]     Essential hypertension 5/25/2017    Hyperlipidemia 5/25/2017    [facility]ox[facility]cardia [facility] 9/18/2018    Primary osteoarthritis of both knees          Past Surgical History:   Procedure Laterality Date    HX HYSTERECTOMY         Current Outpatient Medications   Medication Sig    aspirin delayed-release 81 mg tablet Take 81 mg by mouth daily. metoprolol tartrate (LOPRESSOR) 25 mg tablet Take 0.5 Tablets by mouth two (2) times a day. (Patient taking differently: Take 12.5 mg by mouth two (2) times a day. Takes 1 whole pill during the day and 0.5 tab nightly)    montelukast (SINGULAIR) 10 mg tablet Take 1 Tablet by mouth daily. albuterol (PROVENTIL HFA, VENTOLIN HFA, PROAIR HFA) 90 mcg/actuation inhaler Take 1 Puff by inhalation every six (6) hours as needed for Shortness of Breath. cetirizine (ZYRTEC) 10 mg tablet Take 1 Tablet by mouth daily as needed for Itching. donepeziL (ARICEPT) 10 mg tablet Take 1 Tablet by mouth nightly. ipratropium (ATROVENT) 42 mcg (0.06 %) nasal spray 1 [place] by Both Nostrils route three (3) times daily. acetaminophen (TYLENOL) 325 mg tablet Take  by mouth every four (4) hours as needed for Pain.      No current facility-administered medications for this visit. Allergies and Sensitivities:  No Known Allergies    Family History:  Family History   Problem Relation Age of Onset    Kidney Disease Mother     Hypertension Sister     Kidney Disease Brother        Social History:  Social History     Tobacco Use    Smoking status: Never    Smokeless tobacco: Never   Substance Use Topics    Alcohol use: No    Drug use: No     She  reports that she has never smoked. She has never used smokeless tobacco.  She  reports no history of alcohol use. Review of Systems:  Cardiac symptoms as noted above in HPI. All others negative. Physical Exam:  BP Readings from Last 3 Encounters:   09/29/22 122/78   08/24/22 103/65   06/29/22 127/67         Pulse Readings from Last 3 Encounters:   09/29/22 (!) 59   08/24/22 61   06/29/22 (!) 55          Wt Readings from Last 3 Encounters:   09/29/22 68.9 kg (152 lb)   08/24/22 69.1 kg (152 lb 6.4 oz)   06/29/22 70 kg (154 lb 6.4 oz)       Constitutional: Oriented to person, place, and time. HENT: Head: Normocephalic and atraumatic. Neck: No JVD present. Carotid bruit is not appreciated. Cardiovascular: Regular rhythm. No murmur, gallop or rubs appreciated  Lung: Breath sounds normal. No respiratory distress. No ronchi or rales appreciated  Abdominal: No tenderness. No rebound and no guarding. Musculoskeletal: There is no lower extremity edema.  No cynosis    Review of Data  LABS:   Lab Results   Component Value Date/Time    Sodium 146 (H) 12/08/2021 09:12 AM    Potassium 4.2 12/08/2021 09:12 AM    Chloride 106 12/08/2021 09:12 AM    CO2 23 12/08/2021 09:12 AM    Glucose 86 12/08/2021 09:12 AM    BUN 7 (L) 12/08/2021 09:12 AM    Creatinine 1.09 (H) 12/08/2021 09:12 AM     Lipids Latest Ref Rng & Units 9/7/2021 1/8/2021 10/23/2019 1/7/2019   Chol, Total 100 - 199 mg/dL 147 150 152 162   HDL >39 mg/dL 46 45 44 52   LDL 0 - 99 mg/dL 83 84 85 91   Trig 0 - 149 mg/dL 96 116 115 97   Some recent data might be hidden     Lab Results Component Value Date/Time    ALT (SGPT) 8 12/08/2021 09:12 AM     Lab Results   Component Value Date/Time    Hemoglobin A1c 6.3 (H) 01/08/2021 12:00 AM    Hemoglobin A1c (POC) 5.7 05/05/2022 11:53 AM       EKG    HOLTER (2010)  Vlad Rogel was in Sinus Rhythm. The average heart rate, excluding ectopy, was 56 BPM with a minimum of 39 BPM at 01:20 D2 and a maximum of 80 BPM at 09:19 D2. Heart beats, including ectopy, totaled 345449 beats. VENTRICULAR ECTOPICS totaled 71 averaging 1.5 per hour with 71 single, 0 paired, 0 trigeminy and 0 R on T.  SUPRAVENTRICULAR ECTOPICS totaled 64938 averaging 368. 2 per hour ,with 6203 single and 8976 paired beats. Narrow complex tachycardia occurred 160 times. The fastest run was at 143 BPM and occurred at 08:48 D3 with 508 beats. The longest run was 593 beats at 10:13 D2 at a rate of 142 BPM.  PAUSES occurred 24 times, the longest of which was 2.3 seconds at 22:57:05 D1  Patient diary submitted, no symptoms noted. No patient triggered events noted. ECHO (02/2022)  Left Ventricle Left ventricle size is normal. Mild septal thickening. Normal wall motion. Normal left ventricular systolic function with a visually estimated EF of 55 - 60%. Diastolic dysfunction present with normal LV EF. Left Atrium Left atrium is moderately dilated. Left atrial volume index is severely increased (>48 mL/m2). Right Ventricle Right ventricle size is normal. Mildly reduced systolic function. TAPSE is abnormal.   Right Atrium Right atrium size is normal.   Aortic Valve Valve structure is normal. No transvalvular regurgitation. No stenosis. Mitral Valve Mildly thickened leaflets. Mild transvalvular regurgitation. No stenosis. Tricuspid Valve Valve structure is normal. Mild transvalvular regurgitation. No stenosis. Pulmonic Valve Valve structure is normal. Mild transvalvular regurgitation. No stenosis. Pulmonary Artery Pulmonary hypertension not present.    Aorta Normal sized annulus, sinus of Valsalva and aortic arch. Mildly dilated ascending aorta. STRESS TEST (2018)  COMBINED INTERPRETATION:  1. No evidence for reversible myocardial ischemia or infarction. 2. Gated SPECT left ventricular ejection fraction is 64%. CATHETERIZATION    IMPRESSION & PLAN:  Radha Setting is 80 y.o. female with multiple medical problem    Hypertension: /78. Currently on metoprolol. Continue same    Hyperlipidemia: Last LDL 85. She used to be on atorvastatin. She will go home and call me if she is still taking the medication. Paroxysmal SVT:  Diagnosed on Holter monitor which was performed in March 2020. ECHO 902/2022) , low risk, Normal EF and no major VHD  Event monitor in 02/2022, sinus rhythm with occasional episode of PSVT with heart rate as high as 170 bpm.  Pause up to 2.2 seconds during sleep hours  Continue metoprolol. Without any presyncope or syncope or significant palpitation. This plan was discussed with patient who is in agreement. Thank you for allowing me to participate in patient care. Please feel free to call me if you have any question or concern. Tanvi Kate MD  Please note: This document has been produced using voice recognition software. Unrecognized errors in transcription may be present.

## 2022-09-29 NOTE — LETTER
9/29/2022    Patient: Caleb Patel   YOB: 1929   Date of Visit: 9/29/2022     Aleida Camarena MD  [address]    Dear Aleida Camarena MD,      Thank you for referring Ms. Tyesha Dietz to [facility] for evaluation. My notes for this consultation are attached. If you have questions, please do not hesitate to call me. I look forward to following your patient along with you.       Sincerely,    Dennys Roberts MD

## 2022-09-29 NOTE — PROGRESS NOTES
Identified pt with two pt identifiers(name and ). Reviewed record in preparation for visit and have obtained necessary documentation. Greta Larose presents today for   Chief Complaint   Patient presents with    Follow-up     6 month       Pt denies  DIZZINESS, SOB, CHEST PAIN/ PRESSURE, FATIGUE/WEAKNESS, HEADACHES, SWELLING. Cricket Pringle preferred language for health care discussion is english/other. Personal Protective Equipment:   Personal Protective Equipment was used including: mask-surgical and hands-gloves. Patient was placed on no precaution(s). Patient was masked. Precautions:   Patient currently on None  Patient currently roomed with door closed. Is someone accompanying this pt? yes    Is the patient using any DME equipment during OV? No     Depression Screening:  3 most recent PHQ Screens 2022   Little interest or pleasure in doing things Not at all   Feeling down, depressed, irritable, or hopeless Several days   Total Score PHQ 2 1       Learning Assessment:  Learning Assessment 2017   PRIMARY LEARNER Child(roger)   HIGHEST LEVEL OF EDUCATION - PRIMARY LEARNER  2 YEARS OF COLLEGE   BARRIERS PRIMARY LEARNER NONE   CO-LEARNER CAREGIVER Yes   PRIMARY LANGUAGE ENGLISH    NEED -   LEARNER PREFERENCE PRIMARY DEMONSTRATION   LEARNING SPECIAL TOPICS -   ANSWERED BY Xander POTTS OTHER       Abuse Screening:  Abuse Screening Questionnaire 2020   Do you ever feel afraid of your partner? N   Are you in a relationship with someone who physically or mentally threatens you? N   Is it safe for you to go home? Y       Fall Risk  Fall Risk Assessment, last 12 mths 2021   Able to walk? Yes   Fall in past 12 months? -   Do you feel unsteady? -   Are you worried about falling -   Is TUG test greater than 12 seconds? -   Number of falls in past 12 months -   Fall with injury? -       Pt currently taking Anticoagulant therapy?  No   Pt currently taking Antiplatelet therapy? Yes     Coordination of Care:  1. Have you been to the ER, urgent care clinic since your last visit? Hospitalized since your last visit? No     2. Have you seen or consulted any other health care providers outside of the [address] [place] [address] since your last visit? Include any pap smears or colon screening. Yes       Please see Red banners under Allergies and Med Rec to remove outside inquires. All correct information has been verified with patient and added to chart.      Medication's patient's would liked removed has been marked not taking to be removed per Verbal order and read back per Winthrop Cabot, MD

## 2022-10-11 ENCOUNTER — OFFICE VISIT (OUTPATIENT)
Dept: FAMILY MEDICINE CLINIC | Age: 87
End: 2022-10-11
Payer: MEDICARE

## 2022-10-11 VITALS
RESPIRATION RATE: 16 BRPM | TEMPERATURE: 98 F | HEIGHT: 61 IN | HEART RATE: 57 BPM | OXYGEN SATURATION: 98 % | BODY MASS INDEX: 28.43 KG/M2 | DIASTOLIC BLOOD PRESSURE: 77 MMHG | SYSTOLIC BLOOD PRESSURE: 137 MMHG | WEIGHT: 150.6 LBS

## 2022-10-11 DIAGNOSIS — I10 ESSENTIAL HYPERTENSION: Primary | ICD-10-CM

## 2022-10-11 DIAGNOSIS — E11.29 TYPE 2 DIABETES MELLITUS WITH MICROALBUMINURIA, WITHOUT LONG-TERM CURRENT USE OF INSULIN (HCC): ICD-10-CM

## 2022-10-11 DIAGNOSIS — A09 INFECTIOUS COLITIS: ICD-10-CM

## 2022-10-11 DIAGNOSIS — R09.89 RUNNY NOSE: ICD-10-CM

## 2022-10-11 DIAGNOSIS — B35.1 ONYCHOMYCOSIS: ICD-10-CM

## 2022-10-11 DIAGNOSIS — Z23 ENCOUNTER FOR IMMUNIZATION: ICD-10-CM

## 2022-10-11 DIAGNOSIS — R80.9 TYPE 2 DIABETES MELLITUS WITH MICROALBUMINURIA, WITHOUT LONG-TERM CURRENT USE OF INSULIN (HCC): ICD-10-CM

## 2022-10-11 LAB — HBA1C MFR BLD HPLC: 5.6 %

## 2022-10-11 PROCEDURE — 3044F HG A1C LEVEL LT 7.0%: CPT | Performed by: INTERNAL MEDICINE

## 2022-10-11 PROCEDURE — G8427 DOCREV CUR MEDS BY ELIG CLIN: HCPCS | Performed by: INTERNAL MEDICINE

## 2022-10-11 PROCEDURE — 1101F PT FALLS ASSESS-DOCD LE1/YR: CPT | Performed by: INTERNAL MEDICINE

## 2022-10-11 PROCEDURE — 90694 VACC AIIV4 NO PRSRV 0.5ML IM: CPT | Performed by: INTERNAL MEDICINE

## 2022-10-11 PROCEDURE — G8536 NO DOC ELDER MAL SCRN: HCPCS | Performed by: INTERNAL MEDICINE

## 2022-10-11 PROCEDURE — 1090F PRES/ABSN URINE INCON ASSESS: CPT | Performed by: INTERNAL MEDICINE

## 2022-10-11 PROCEDURE — G8417 CALC BMI ABV UP PARAM F/U: HCPCS | Performed by: INTERNAL MEDICINE

## 2022-10-11 PROCEDURE — 83036 HEMOGLOBIN GLYCOSYLATED A1C: CPT | Performed by: INTERNAL MEDICINE

## 2022-10-11 PROCEDURE — G8510 SCR DEP NEG, NO PLAN REQD: HCPCS | Performed by: INTERNAL MEDICINE

## 2022-10-11 PROCEDURE — 99214 OFFICE O/P EST MOD 30 MIN: CPT | Performed by: INTERNAL MEDICINE

## 2022-10-11 PROCEDURE — 1123F ACP DISCUSS/DSCN MKR DOCD: CPT | Performed by: INTERNAL MEDICINE

## 2022-10-11 PROCEDURE — G0008 ADMIN INFLUENZA VIRUS VAC: HCPCS | Performed by: INTERNAL MEDICINE

## 2022-10-11 NOTE — PROGRESS NOTES
Assessment/ Plan:   Diagnoses and all orders for this visit:    1. Essential hypertension-controlled, continue with low dose beta blocker    2. Type 2 diabetes mellitus with microalbuminuria, without long-term current use of insulin ([facility])-A1c at goal off meds  -     AMB POC HEMOGLOBIN A1C  -     REFERRAL TO PODIATRY    3. Onychomycosis  -     REFERRAL TO PODIATRY    4. Runny nose-may stop Singulair as it is not helping either    5. Infectious colitis-no further blood in stools; advised she does not need to see GI because at her age, having a procedure such as a colonoscopy is very risky      Follow-up and Dispositions    Return in about 3 months (around 1/11/2023) for follow up. Chief Complaint   Patient presents with    Follow Up Chronic Condition       Pt is a 80y.o. year old female who presents for follow up of her chronic medical problems    Health Maintenance Due   Topic Date Due    Shingrix Vaccine Age 49> (1 of 2)- Never done    Foot Exam Q1  10/23/2020    Flu Vaccine (1)-today 08/01/2022    COVID-19 Vaccine (4 - Booster for Pfizer series) 09/18/2022      Wt Readings from Last 3 Encounters:   10/11/22 150 lb 9.6 oz (68.3 kg)   09/29/22 152 lb (68.9 kg)   08/24/22 152 lb 6.4 oz (69.1 kg)        BP Readings from Last 3 Encounters:   10/11/22 137/77   09/29/22 122/78   08/24/22 103/65        Last Point of Care HGB A1C  Hemoglobin A1c (POC)   Date Value Ref Range Status   10/11/2022 5.6 % Final         FMLA for her [facility] who takes care of her    No further blood in the stools    Toenails thick and hard-previous Podiatry no longer taking her insurance    No need to take cholesterol med bec of her age    Saw [address] recently-Q yr  ROS:    Pt denies: Wt loss, Fever/Chills, HA, Visual changes, Fatigue, Chest pain, SOB, SALOMON, Abd pain, N/V/D/C, Blood in stool or urine, Edema. Pertinent positive as above in HPI.  All others were negative    Patient Active Problem List   Diagnosis Code Essential hypertension I10    Osteoporosis without current pathological fracture M81.0    Hyperlipidemia E78.5    Primary osteoarthritis of both knees M17.0    Advanced directives, counseling/discussion Z71.89    Overweight (BMI 25.0-29. 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    Ischemic colitis ([facility] K55.9    Dementia without behavioral disturbance ([facility] F03.90    Rectal hemorrhage K62.5    Acquired hypothyroidism E03.9    Chronic renal disease, stage III N18.30       Past Medical History:   Diagnosis Date    Acquired hypothyroidism 5/12/2022    Diabetes ([facility]     Essential hypertension 5/25/2017    Hyperlipidemia 5/25/2017    [facility] 9/18/2018    Primary osteoarthritis of both knees        Current Outpatient Medications   Medication Sig Dispense Refill    aspirin delayed-release 81 mg tablet Take 81 mg by mouth daily. montelukast (SINGULAIR) 10 mg tablet Take 1 Tablet by mouth daily. 30 Tablet 3    albuterol (PROVENTIL HFA, VENTOLIN HFA, PROAIR HFA) 90 mcg/actuation inhaler Take 1 Puff by inhalation every six (6) hours as needed for Shortness of Breath. 18 g 0    cetirizine (ZYRTEC) 10 mg tablet Take 1 Tablet by mouth daily as needed for Itching. 30 Tablet 0    donepeziL (ARICEPT) 10 mg tablet Take 1 Tablet by mouth nightly. 90 Tablet 1    ipratropium (ATROVENT) 42 mcg (0.06 %) nasal spray 1 [place] by Both Nostrils route three (3) times daily. 15 mL 1    metoprolol tartrate (LOPRESSOR) 25 mg tablet Take 0.5 Tablets by mouth two (2) times a day. (Patient taking differently: Take 12.5 mg by mouth two (2) times a day.  Takes 1 whole pill during the day and 0.5 tab nightly) 180 Tablet 1    acetaminophen (TYLENOL) 325 mg tablet Take  by mouth every four (4) hours as needed for Pain. Social History     Tobacco Use   Smoking Status Never   Smokeless Tobacco Never       No Known Allergies    Patient Labs were reviewed: yes    Patient Past Records were reviewed: yes      Objective:     Vitals:    10/11/22 1136   BP: 137/77   Pulse: (!) 57   Resp: 16   Temp: 98 °F (36.7 °C)   TempSrc: Temporal   SpO2: 98%   Weight: 150 lb 9.6 oz (68.3 kg)   Height: 5' 1\" (1.549 m)     Body mass index is 28.46 kg/m². Exam:   Appearance: alert, well appearing,  oriented to person, place, and time, acyanotic, in no respiratory distress and well hydrated. HEENT:  NC/AT, pink conj, anicteric sclerae  Neck:  No cervical lymphadenopathy, no JVD, no thyromegaly, no carotid bruit  Heart:  RRR without M/R/G  Lungs:  CTAB, no rhonchi, rales, or wheezes with good air exchange   Abdomen:  Non-tender, pos bowel sounds, no hepatosplenomegaly  Ext:  No C/C/E , toenails thickened and yellowish   Skin: no rash  Neuro: no lateralizing signs, CNs II-XII intact            I have discussed the diagnosis with the patient and the intended plan as seen in the above orders. The patient has received an After-Visit Summary and questions were answered concerning future plans. Medication Side Effects and Warnings were discussed with patient: yes    Patient verbalized understanding of above instructions.     Nicko Gatica MD  Internal Medicine  [facility]

## 2022-10-11 NOTE — PROGRESS NOTES
Patient seen for routine follow up     Health Maintenance Due   Topic Date Due    Shingrix Vaccine Age 49> (1 of 2) Never done    Foot Exam Q1  10/23/2020    Flu Vaccine (1) 08/01/2022    COVID-19 Vaccine (4 - Booster for Pfizer series) 09/18/2022     1. \"Have you been to the ER, urgent care clinic since your last visit? Hospitalized since your last visit? \" No    2. \"Have you seen or consulted any other health care providers outside of the [address] [place] [address] since your last visit? \" Cardiology for routine follow up    3. For patients aged 39-70: Has the patient had a colonoscopy / FIT/ Cologuard? NA - based on age      If the patient is female:    4. For patients aged 41-77: Has the patient had a mammogram within the past 2 years? NA - based on age or sex      11. For patients aged 21-65: Has the patient had a pap smear? NA - based on age or sex    Patient was given VIS for review,consent was obtained and per orders of Dr. Sanjuanita Patterson, [facility] observed. No signs nor symptoms of any adverse reactions. Patient tolerated injection well.

## 2022-11-03 DIAGNOSIS — F03.90 DEMENTIA WITHOUT BEHAVIORAL DISTURBANCE (HCC): ICD-10-CM

## 2022-11-03 RX ORDER — DONEPEZIL HYDROCHLORIDE 10 MG/1
10 TABLET, FILM COATED ORAL
Qty: 90 TABLET | Refills: 1 | Status: SHIPPED | OUTPATIENT
Start: 2022-11-03

## 2022-11-21 ENCOUNTER — PATIENT OUTREACH (OUTPATIENT)
Dept: CASE MANAGEMENT | Age: 87
End: 2022-11-21

## 2022-11-21 NOTE — PROGRESS NOTES
.Complex Case Management      Date/Time:  2022 3:54 PM    Method of communication with patient:phone    [facility] ([facility]) contacted the  Granddaughter ,caregiver, Nikki Sanchez  by telephone to perform Ambulatory Care Coordination. Verified name and  (PHI) with family as identifiers. Provided introduction to self, and explanation of the Ambulatory Care Manager's role. Reviewed most recent clinic visit w/ family who verbalized understanding. Family given an opportunity to ask questions. Top Challenges reviewed with the Provider   Mornings lately patient has been complaining about not feeling well slow. When she first get up. Granddaughter gives her O.J, or something to eat she perks up. Usually eats her last meal around 5 pm don't get up until after 8 am to come down stairs around 9. [facility] suggested she may need to have a light snack before retiring since she goes to bed around 11-11:30 . Peanut butter crackles,cheese and crackles or a Peanut but jelly sandwich. Granddaughter voiced sometimes she ask her to go downstairs and get her something to eat around 7 or 8 pm. The next morning she does well. Granddaughter voiced patient was taken off diabetic medications. Schedule to go to [facility] to get toenails clipped. The family agrees to contact the PCP office or the [address] [place] [address] for questions related to their healthcare. Provided contact information for future reference. Disease Specific:   N/A    Home Health Active: No    DME Active: Yes    Barriers to care? none    Advance Care Planning:   Does patient have an Advance Directive:  not on file; education provided     Medication(s):   Medication reconciliation was performed with caregiver, who verbalizes understanding of administration of home medications. There were no barriers to obtaining medications identified at this time.     Referral to Pharm D needed: no     Current Outpatient Medications Medication Sig    donepeziL (ARICEPT) 10 mg tablet Take 1 Tablet by mouth nightly. aspirin delayed-release 81 mg tablet Take 81 mg by mouth daily. montelukast (SINGULAIR) 10 mg tablet Take 1 Tablet by mouth daily. albuterol (PROVENTIL HFA, VENTOLIN HFA, PROAIR HFA) 90 mcg/actuation inhaler Take 1 Puff by inhalation every six (6) hours as needed for Shortness of Breath. cetirizine (ZYRTEC) 10 mg tablet Take 1 Tablet by mouth daily as needed for Itching. ipratropium (ATROVENT) 42 mcg (0.06 %) nasal spray 1 [place] by Both Nostrils route three (3) times daily. metoprolol tartrate (LOPRESSOR) 25 mg tablet Take 0.5 Tablets by mouth two (2) times a day. (Patient taking differently: Take 12.5 mg by mouth two (2) times a day. Takes 1 whole pill during the day and 0.5 tab nightly)    acetaminophen (TYLENOL) 325 mg tablet Take  by mouth every four (4) hours as needed for Pain. No current facility-administered medications for this visit. [facility] follow up appointment(s):   Future Appointments   Date Time Provider Meredith Francisco   1/12/2023 11:30 AM Ramsey Jacques MD [facility] BS AMB   10/3/2023 10:00 AM Siobhan Garcia MD [facility] BS AMB        Non-[facility] follow up appointment(s): N/O     Goals Addressed                   This Visit's Progress       Chronic Disease     Develop action plan for Self-Management Chronic Disease. HTN  Patient will take all medications  Patient will maintain low salt diet  Patient will exercise as tolerated       Prepare patients and caregivers for end of life decisions , advance directives. ACP will be discussed with patient and family before end of episode         Diabetes     Reduce risk of comorbid complications related to diabetes (renal disease, heart disease, amputation, and retinopathy).         Patient has been taken off DM medications   Monitor with diet  Exercise  Check feet          General     Attends follow up appointments on schedule Patient will attend all scheduled f/u appointments:  Has appointment with [facility] 12/12/22 to get toenails clipped . Office looking for an Podiatrist that takes patients insurance. Follows diet/fluid recommendations and maintains goal weight        Patient is on a no salt no concentrated sweets low cholesterol diet.   Use to be on ADA diet but lasy A1c 5.6 and 4 previous ones were in the 5

## 2022-11-22 ENCOUNTER — PATIENT OUTREACH (OUTPATIENT)
Dept: CASE MANAGEMENT | Age: 87
End: 2022-11-22

## 2022-11-22 NOTE — PROGRESS NOTES
.Patient: Recardo Rolls discussed during interdisciplinary rounds 11/22/2022 to optimize plan of care. Patient with a past medical history of   Past Medical History:   Diagnosis Date    Acquired hypothyroidism 5/12/2022    [facility]     Essential hypertension 5/25/2017    Hyperlipidemia 5/25/2017    Paroxysmal tachycardia ([facility] 9/18/2018    Primary osteoarthritis of both knees    . In attendance Terrie Wright MD, Rebecca Taylor RN, ACM. Recommendations from the team:Continue to monitor per Dr Lurdes Cloud, Ambulatory  will continue to follow.   Rebecca Taylor RN

## 2022-11-30 ENCOUNTER — PATIENT OUTREACH (OUTPATIENT)
Dept: CASE MANAGEMENT | Age: 87
End: 2022-11-30

## 2022-11-30 NOTE — PROGRESS NOTES
.Complex Case Management      Date/Time:  2022 1:42 PM    Method of communication with patient:phone    [facility] ([facility]) contacted the caregiver , Granddaughter Ms Tara Simmons by telephone to perform Ambulatory Care Coordination. Verified name and  (PHI) with caregiver as identifiers. Provided introduction to self, and explanation of the Ambulatory Care Manager's role. Reviewed most recent clinic visit w/ caregiver who verbalized understanding. Caregiver given an opportunity to ask questions. Top Challenges reviewed with the Provider   No Needs at this time     The caregiver agrees to contact the PCP office or the [facility] for questions related to their healthcare. Provided contact information for future reference. Disease Specific:   N/A    Home Health Active: No    DME Active: Yes    Barriers to care? None    Advance Care Planning:   Does patient have an Advance Directive:  not on file; education provided     Medication(s):   Medication reconciliation was performed with caregiver, who verbalizes understanding of administration of home medications. There were no barriers to obtaining medications identified at this time. Referral to Pharm D needed: no     Current Outpatient Medications   Medication Sig    donepeziL (ARICEPT) 10 mg tablet Take 1 Tablet by mouth nightly. aspirin delayed-release 81 mg tablet Take 81 mg by mouth daily. montelukast (SINGULAIR) 10 mg tablet Take 1 Tablet by mouth daily. albuterol (PROVENTIL HFA, VENTOLIN HFA, PROAIR HFA) 90 mcg/actuation inhaler Take 1 Puff by inhalation every six (6) hours as needed for Shortness of Breath. cetirizine (ZYRTEC) 10 mg tablet Take 1 Tablet by mouth daily as needed for Itching. ipratropium (ATROVENT) 42 mcg (0.06 %) nasal spray 1 [place] by Both Nostrils route three (3) times daily. metoprolol tartrate (LOPRESSOR) 25 mg tablet Take 0.5 Tablets by mouth two (2) times a day.  (Patient taking differently: Take 12.5 mg by mouth two (2) times a day. Takes 1 whole pill during the day and 0.5 tab nightly)    acetaminophen (TYLENOL) 325 mg tablet Take  by mouth every four (4) hours as needed for Pain. No current facility-administered medications for this visit.        [facility] follow up appointment(s):   Future Appointments   Date Time Provider Meredith Maryam   1/12/2023 11:30 AM David Carroll MD AM BS AMB   10/3/2023 10:00 AM Swati Tim MD [facility] BS AMB        Non-[facility] follow up appointment(s): No     Goals Addressed    None

## 2022-12-12 DIAGNOSIS — R09.89 RUNNY NOSE: ICD-10-CM

## 2022-12-13 RX ORDER — MONTELUKAST SODIUM 10 MG/1
10 TABLET ORAL DAILY
Qty: 30 TABLET | Refills: 3 | Status: SHIPPED | OUTPATIENT
Start: 2022-12-13

## 2022-12-15 ENCOUNTER — PATIENT OUTREACH (OUTPATIENT)
Dept: CASE MANAGEMENT | Age: 87
End: 2022-12-15

## 2022-12-15 NOTE — PROGRESS NOTES
.Complex Case Management      Date/Time:  12/15/2022 4:05 PM    Method of communication with patient:phone    [facility] ([facility]) contacted the caregiver , granddaughter Niru Dobbs by telephone to perform Ambulatory Care Coordination. Verified name and  (PHI) with caregiver as identifiers. Provided introduction to self, and explanation of the Ambulatory Care Manager's role. Reviewed most recent clinic visit w/ caregiver who verbalized understanding. Caregiver given an opportunity to ask questions. Top Challenges reviewed with the Provider   Still with a runny nose so far with New Singulair stated     The caregiver agrees to contact the PCP office or the [facility] for questions related to their healthcare. Provided contact information for future reference. Disease Specific:   N/A    Home Health Active: No    DME Active: Yes    Barriers to care? None    Advance Care Planning:   Does patient have an Advance Directive:  not on file     Medication(s):   Medication reconciliation was performed with caregiver, who verbalizes understanding of administration of home medications. There were no barriers to obtaining medications identified at this time. Referral to Pharm D needed: no     Current Outpatient Medications   Medication Sig    montelukast (SINGULAIR) 10 mg tablet Take 1 Tablet by mouth daily. donepeziL (ARICEPT) 10 mg tablet Take 1 Tablet by mouth nightly. aspirin delayed-release 81 mg tablet Take 81 mg by mouth daily. albuterol (PROVENTIL HFA, VENTOLIN HFA, PROAIR HFA) 90 mcg/actuation inhaler Take 1 Puff by inhalation every six (6) hours as needed for Shortness of Breath. cetirizine (ZYRTEC) 10 mg tablet Take 1 Tablet by mouth daily as needed for Itching. ipratropium (ATROVENT) 42 mcg (0.06 %) nasal spray 1 [place] by Both Nostrils route three (3) times daily. metoprolol tartrate (LOPRESSOR) 25 mg tablet Take 0.5 Tablets by mouth two (2) times a day. (Patient taking differently: Take 12.5 mg by mouth two (2) times a day. Takes 1 whole pill during the day and 0.5 tab nightly)    acetaminophen (TYLENOL) 325 mg tablet Take  by mouth every four (4) hours as needed for Pain. No current facility-administered medications for this visit. [facility] follow up appointment(s):   Future Appointments   Date Time Provider Meredith Tiani   1/12/2023 11:30 AM Sven Thompson MD AMA BS AMB   10/3/2023 10:00 AM Maddie Hamilton MD [facility] BS AMB        Non-[facility] follow up appointment(s): No     Goals Addressed                   This Visit's Progress       Chronic Disease     Develop action plan for Self-Management Chronic Disease. On track     HTN  Patient will take all medications  Patient will maintain low salt diet  Patient will exercise as tolerated  12/15  B/P is good. Patient walks around downstairs         Diabetes     Reduce risk of comorbid complications related to diabetes (renal disease, heart disease, amputation, and retinopathy). On track     Patient has been taken off DM medications   Monitor with diet  Exercise  Check feet   12/15  Doing well Granddaughter check feet daily with bathing  Maintaining ADA diet         General     Attends follow up appointments on schedule   On track     Patient will attend all scheduled f/u appointments:  Has appointment with [facility] 12/12/22 to get toenails clipped . Office looking for an Podiatrist that takes patients insurance. Follows diet/fluid recommendations and maintains goal weight   On track     Patient is on a no salt no concentrated sweets low cholesterol diet.   Use to be on ADA diet but lasy A1c 5.6 and 4 previous ones were in the 5   12/15  Maintaining ADA diet

## 2022-12-29 ENCOUNTER — PATIENT OUTREACH (OUTPATIENT)
Dept: CASE MANAGEMENT | Age: 87
End: 2022-12-29

## 2022-12-29 NOTE — PROGRESS NOTES
.Complex Case Management      Date/Time:  2022 9:45 AM    Method of communication with patient:phone    [facility] ([facility]) contacted the caregiver granddaughter Torsten Alas by telephone to perform Ambulatory Care Coordination. Verified name and  (PHI) with caregiver as identifiers. Provided introduction to self, and explanation of the Ambulatory Care Manager's role. Reviewed most recent clinic visit w/ caregiver who verbalized understanding. Caregiver given an opportunity to ask questions. Top Challenges reviewed with the Provider   N/A     The caregiver agrees to contact the PCP office or the [facility] for questions related to their healthcare. Provided contact information for future reference. Disease Specific:   N/A    Home Health Active: No    DME Active: Yes    Barriers to care? None    Advance Care Planning:   Does patient have an Advance Directive:  not on file; education provided     Medication(s):   Medication reconciliation was performed with caregiver, who verbalizes understanding of administration of home medications. There were no barriers to obtaining medications identified at this time. Referral to Pharm D needed: no     Current Outpatient Medications   Medication Sig    montelukast (SINGULAIR) 10 mg tablet Take 1 Tablet by mouth daily. donepeziL (ARICEPT) 10 mg tablet Take 1 Tablet by mouth nightly. aspirin delayed-release 81 mg tablet Take 81 mg by mouth daily. albuterol (PROVENTIL HFA, VENTOLIN HFA, PROAIR HFA) 90 mcg/actuation inhaler Take 1 Puff by inhalation every six (6) hours as needed for Shortness of Breath. cetirizine (ZYRTEC) 10 mg tablet Take 1 Tablet by mouth daily as needed for Itching. ipratropium (ATROVENT) 42 mcg (0.06 %) nasal spray 1 [place] by Both Nostrils route three (3) times daily. metoprolol tartrate (LOPRESSOR) 25 mg tablet Take 0.5 Tablets by mouth two (2) times a day.  (Patient taking differently: Take 12.5 mg by mouth two (2) times a day. Takes 1 whole pill during the day and 0.5 tab nightly)    acetaminophen (TYLENOL) 325 mg tablet Take  by mouth every four (4) hours as needed for Pain. No current facility-administered medications for this visit. [facility] follow up appointment(s):   Future Appointments   Date Time Provider Meredith Francisco   1/12/2023 11:30 AM David Carroll MD [facility] BS AMB   10/3/2023 10:00 AM Swati Tim MD [facility] BS AMB        Non-[facility] follow up appointment(s): No     Goals Addressed                   This Visit's Progress       Chronic Disease     Develop action plan for Self-Management Chronic Disease. On track     HTN  Patient will take all medications  Patient will maintain low salt diet  Patient will exercise as tolerated  12/15  B/P is good. Patient walks around downstairs  12/29/2022  On going       Prepare patients and caregivers for end of life decisions , advance directives. On track     ACP will be discussed with patient and family before end of episode  12/29/22  Granddaughter will discuss with family and patient         Diabetes     Reduce risk of comorbid complications related to diabetes (renal disease, heart disease, amputation, and retinopathy). On track     Patient has been taken off DM medications   Monitor with diet  Exercise  Check feet   12/15  Doing well Granddaughter check feet daily with bathing  Maintaining ADA diet  12/29/22  No going         General     COMPLETED: Attends follow up appointments on schedule   On track     Patient will attend all scheduled f/u appointments:  Has appointment with [facility] 12/12/22 to get toenails clipped . Office looking for an Podiatrist that takes patients insurance. 12/29/22  Patient did get her toenails clipped       Follows diet/fluid recommendations and maintains goal weight   On track     Patient is on a no salt no concentrated sweets low cholesterol diet.   Use to be on ADA diet but lasy A1c 5.6 and 4 previous ones were in the 5   12/15  Maintaining ADA diet  12/29/22  On going birthday 12/30 maybe going to [employer] her favorite place to eat 89 Y/O

## 2023-01-09 ENCOUNTER — VIRTUAL VISIT (OUTPATIENT)
Dept: FAMILY MEDICINE CLINIC | Age: 88
End: 2023-01-09
Payer: MEDICARE

## 2023-01-09 DIAGNOSIS — R80.9 TYPE 2 DIABETES MELLITUS WITH MICROALBUMINURIA, WITHOUT LONG-TERM CURRENT USE OF INSULIN (HCC): ICD-10-CM

## 2023-01-09 DIAGNOSIS — N18.31 STAGE 3A CHRONIC KIDNEY DISEASE (HCC): ICD-10-CM

## 2023-01-09 DIAGNOSIS — I47.1 PSVT (PAROXYSMAL SUPRAVENTRICULAR TACHYCARDIA) (HCC): ICD-10-CM

## 2023-01-09 DIAGNOSIS — E11.29 TYPE 2 DIABETES MELLITUS WITH MICROALBUMINURIA, WITHOUT LONG-TERM CURRENT USE OF INSULIN (HCC): ICD-10-CM

## 2023-01-09 DIAGNOSIS — Z00.00 MEDICARE ANNUAL WELLNESS VISIT, SUBSEQUENT: Primary | ICD-10-CM

## 2023-01-09 DIAGNOSIS — R05.1 ACUTE COUGH: ICD-10-CM

## 2023-01-09 DIAGNOSIS — F03.90 DEMENTIA WITHOUT BEHAVIORAL DISTURBANCE (HCC): ICD-10-CM

## 2023-01-09 PROBLEM — K55.9 ISCHEMIC COLITIS (HCC): Status: RESOLVED | Noted: 2021-12-07 | Resolved: 2023-01-09

## 2023-01-09 PROCEDURE — G8417 CALC BMI ABV UP PARAM F/U: HCPCS | Performed by: INTERNAL MEDICINE

## 2023-01-09 PROCEDURE — G0439 PPPS, SUBSEQ VISIT: HCPCS | Performed by: INTERNAL MEDICINE

## 2023-01-09 PROCEDURE — G8427 DOCREV CUR MEDS BY ELIG CLIN: HCPCS | Performed by: INTERNAL MEDICINE

## 2023-01-09 PROCEDURE — 99214 OFFICE O/P EST MOD 30 MIN: CPT | Performed by: INTERNAL MEDICINE

## 2023-01-09 PROCEDURE — 1123F ACP DISCUSS/DSCN MKR DOCD: CPT | Performed by: INTERNAL MEDICINE

## 2023-01-09 PROCEDURE — 1101F PT FALLS ASSESS-DOCD LE1/YR: CPT | Performed by: INTERNAL MEDICINE

## 2023-01-09 PROCEDURE — G8536 NO DOC ELDER MAL SCRN: HCPCS | Performed by: INTERNAL MEDICINE

## 2023-01-09 PROCEDURE — G8432 DEP SCR NOT DOC, RNG: HCPCS | Performed by: INTERNAL MEDICINE

## 2023-01-09 PROCEDURE — 1090F PRES/ABSN URINE INCON ASSESS: CPT | Performed by: INTERNAL MEDICINE

## 2023-01-09 RX ORDER — BENZONATATE 100 MG/1
100 CAPSULE ORAL
Qty: 30 CAPSULE | Refills: 0 | Status: SHIPPED | OUTPATIENT
Start: 2023-01-09 | End: 2023-01-19

## 2023-01-09 RX ORDER — ALBUTEROL SULFATE 90 UG/1
1 AEROSOL, METERED RESPIRATORY (INHALATION)
Qty: 18 G | Refills: 0 | Status: SHIPPED | OUTPATIENT
Start: 2023-01-09

## 2023-01-09 RX ORDER — DOXYCYCLINE 100 MG/1
100 TABLET ORAL 2 TIMES DAILY
Qty: 20 TABLET | Refills: 0 | Status: SHIPPED | OUTPATIENT
Start: 2023-01-09 | End: 2023-01-19

## 2023-01-09 NOTE — PATIENT INSTRUCTIONS
Medicare Wellness Visit, Female     The best way to live healthy is to have a lifestyle where you eat a well-balanced diet, exercise regularly, limit alcohol use, and quit all forms of tobacco/nicotine, if applicable. Regular preventive services are another way to keep healthy. Preventive services (vaccines, screening tests, monitoring & exams) can help personalize your care plan, which helps you manage your own care. Screening tests can find health problems at the earliest stages, when they are easiest to treat. [facility] follows the current, evidence-based guidelines published by the [facility] ([facility]) when recommending preventive services for our patients. Because we follow these guidelines, sometimes recommendations change over time as research supports it. (For example, mammograms used to be recommended annually. Even though Medicare will still pay for an annual mammogram, the newer guidelines recommend a mammogram every two years for women of average risk). Of course, you and your doctor may decide to screen more often for some diseases, based on your risk and your co-morbidities (chronic disease you are already diagnosed with). Preventive services for you include:  - Medicare offers their members a free annual wellness visit, which is time for you and your primary care provider to discuss and plan for your preventive service needs.  Take advantage of this benefit every year!    -Over the age of 72 should receive the recommended pneumonia vaccines.    -All adults should have a flu vaccine yearly.  -All adults should have a tetanus vaccine every 10 years.   -Over the age 48 should receive the shingles vaccines.        -All adults should be screened once for Hepatitis C.  -All adults age 38-68 who are overweight should have a diabetes screening test once every three years.   -Other screening tests and preventive services for persons with diabetes include: an eye exam to screen for diabetic retinopathy, a kidney function test, a foot exam, and stricter control over your cholesterol.   -Cardiovascular screening for adults with routine risk involves an electrocardiogram (ECG) at intervals determined by your doctor.     -Colorectal cancer screenings should be done for adults age 39-70 with no increased risk factors for colorectal cancer. There are a number of acceptable methods of screening for this type of cancer. Each test has its own benefits and drawbacks. Discuss with your doctor what is most appropriate for you during your annual wellness visit. The different tests include: colonoscopy (considered the best screening method), a fecal occult blood test, a fecal DNA test, and sigmoidoscopy.    -Lung cancer screening is recommended annually with a low dose CT scan for adults between age 54 and 68, who have smoked at least 30 pack years (equivalent of 1 pack per day for 30 days), and who is a current smoker or quit less than 15 years ago.    -A bone mass density test is recommended when a woman turns 65 to screen for osteoporosis. This test is only recommended one time, as a screening. Some providers will use this same test as a disease monitoring tool if you already have osteoporosis. -Breast cancer screenings are recommended every other year for women of normal risk, age 54-69.    -Cervical cancer screenings for women over age 72 are only recommended with certain risk factors.      Here is a list of your current Health Maintenance items (your personalized list of preventive services) with a due date:  Health Maintenance Due   Topic Date Due    Shingles Vaccine (1 of 2) Never done    COVID-19 Vaccine (4 - Booster for Aparicio Peter series) 07/13/2022

## 2023-01-09 NOTE — PROGRESS NOTES
1. \"Have you been to the ER, urgent care clinic since your last visit? Hospitalized since your last visit? \" No    2. \"Have you seen or consulted any other health care providers outside of the [address] [place] [address] since your last visit? \" No     3. For patients aged 39-70: Has the patient had a colonoscopy / FIT/ Cologuard? NA - based on age      If the patient is female:    4. For patients aged 41-77: Has the patient had a mammogram within the past 2 years? NA - based on age or sex      11. For patients aged 21-65: Has the patient had a pap smear?  NA - based on age or sex

## 2023-01-09 NOTE — PROGRESS NOTES
Erika Becerra is a 80 y.o. female who was seen by synchronous (real-time) audio-video technology on 1/9/2023 for Cough (White phlegm) and Urinary Incontinence (Due to coughing so hard)        Assessment & Plan:   Diagnoses and all orders for this visit:    1. Medicare annual wellness visit, subsequent-see note below    2. Acute cough  -     albuterol (PROVENTIL HFA, VENTOLIN HFA, PROAIR HFA) 90 mcg/actuation inhaler; Take 1 Puff by inhalation every six (6) hours as needed for Shortness of Breath. -     benzonatate (TESSALON) 100 mg capsule; Take 1 Capsule by mouth three (3) times daily as needed for Cough for up to 10 days. -     doxycycline (ADOXA) 100 mg tablet; Take 1 Tablet by mouth two (2) times a day for 10 days. 3. Dementia without behavioral disturbance (HCC)-continue with Aricept    4. Type 2 diabetes mellitus with microalbuminuria, without long-term current use of insulin (HCC)-off meds    5. PSVT (paroxysmal supraventricular tachycardia) (HCC)-continue with beta blocker, follow with Cardio    6. Stage 3a chronic kidney disease (HCC)-continue to avoid OTC NSAID    Follow-up and Dispositions    Return in about 3 months (around 4/9/2023) for follow up, cancel upcoming appt this week. Subjective:     Health Maintenance Due   Topic Date Due    Shingles Vaccine (1 of 2) Never done    COVID-19 Vaccine (4 - Booster for Pfizer series) 07/13/2022        5 days ago-cough whitish phlegm  No fever  Inhaler  Family member was sick earlier  SOB when coughing only  Has had vaccines      Cardio appt in Oct    Home BPs normal    Prior to Admission medications    Medication Sig Start Date End Date Taking? Authorizing Provider   montelukast (SINGULAIR) 10 mg tablet Take 1 Tablet by mouth daily. 12/13/22  Yes Marcie Alberto MD   donepeziL (ARICEPT) 10 mg tablet Take 1 Tablet by mouth nightly. 11/3/22  Yes Ria Regan MD   aspirin delayed-release 81 mg tablet Take 81 mg by mouth daily. Yes Provider, Historical   albuterol (PROVENTIL HFA, VENTOLIN HFA, PROAIR HFA) 90 mcg/actuation inhaler Take 1 Puff by inhalation every six (6) hours as needed for Shortness of Breath. 7/12/22  Yes Miguel Angel Mckenzie MD   metoprolol tartrate (LOPRESSOR) 25 mg tablet Take 0.5 Tablets by mouth two (2) times a day. Patient taking differently: Take 12.5 mg by mouth two (2) times a day. Takes 1 whole pill during the day and 0.5 tab nightly 12/30/21  Yes Juliet Chávez MD   acetaminophen (TYLENOL) 325 mg tablet Take  by mouth every four (4) hours as needed for Pain. 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    Type 2 diabetes with nephropathy ([facility] 09/26/2018    PSVT (paroxysmal supraventricular tachycardia) ([facility] 09/18/2018    CKD (chronic kidney disease) stage 3, GFR 30-59 ml/min ([address] 05/15/2018    Type 2 diabetes mellitus with hyperglycemia, without long-term current use of insulin ([facility] 02/26/2018    Overweight (BMI 25.0-29.9) 01/09/2018    Advanced directives, counseling/discussion 10/10/2017    Essential hypertension 05/25/2017    Osteoporosis without current pathological fracture 05/25/2017    Hyperlipidemia 05/25/2017    Primary osteoarthritis of both knees 05/25/2017       ROS  Pt denies: Wt loss, Fever/Chills, HA, Visual changes, Fatigue, Chest pain, SOB, SALOMON, Abd pain, N/V/D/C, Blood in stool or urine, Edema. Pertinent positive as above in HPI.  All others were negative       Objective:     Patient-Reported Vitals 10/21/2020   Patient-Reported Weight 162   Patient-Reported Pulse -   Patient-Reported Systolic  283   Patient-Reported Diastolic 74      General: alert, cooperative, no distress   Mental  status: normal mood, behavior, speech, dress, motor activity, and thought processes, able to follow commands   HENT: NCAT   Neck: no visualized mass   Resp: no respiratory distress   Neuro: no gross deficits   Skin: no discoloration or lesions of concern on visible areas   Psychiatric: normal affect, consistent with stated mood, no evidence of hallucinations     Additional exam findings: We discussed the expected course, resolution and complications of the diagnosis(es) in detail. Medication risks, benefits, costs, interactions, and alternatives were discussed as indicated. I advised her to contact the office if her condition worsens, changes or fails to improve as anticipated. She expressed understanding with the diagnosis(es) and plan. Nohemi Gatica, was evaluated through a synchronous (real-time) audio-video encounter. The patient (or guardian if applicable) is aware that this is a billable service, which includes applicable co-pays. This Virtual Visit was conducted with patient's (and/or legal guardian's) consent. The visit was conducted pursuant to the emergency declaration under the [address] [place] [address], [address] [place] [address] authority and the [facility] and [employer] General Act. Patient identification was verified, and a caregiver was present when appropriate. The patient was located at: Home: [address] [place] [address]  The provider was located at: Facility (Appt Department): [place] [address]  [address]        Adeola Seay MD         This is the Subsequent Medicare Annual Wellness Exam, performed 12 months or more after the Initial AWV or the last Subsequent AWV    I have reviewed the patient's medical history in detail and updated the computerized patient record.         Assessment/Plan   Education and counseling provided:  Are appropriate based on today's review and evaluation  End-of-Life planning (with patient's consent)-primary decision maker verified  Pneumococcal Vaccine-done  Influenza Vaccine-done  Cardiovascular screening blood test-lipids up to date  Bone mass measurement (DEXA)-declined  Screening for glaucoma-declined  Diabetes screening test-a1c is up to date    1. Medicare annual wellness visit, subsequent-Refer to above for plan and to patient instructions for recommendations on       RTC yearly for wellness visit    Depression Risk Factor Screening:     3 most recent PHQ Screens 11/21/2022   Little interest or pleasure in doing things Not at all   Feeling down, depressed, irritable, or hopeless Not at all   Total Score PHQ 2 0       Alcohol & Drug Abuse Risk Screen    Do you average more than 1 drink per night or more than 7 drinks a week:  No    On any one occasion in the past three months have you have had more than 3 drinks containing alcohol:  No          Functional Ability and Level of Safety:    Hearing: Hearing is good. Activities of Daily Living: The home contains: handrails and grab bars  Patient needs help with:  phone, transportation, shopping, preparing meals, laundry, housework, managing medications, managing money, eating, dressing, bathing, hygiene, and bathroom needs      Ambulation: with difficulty, uses a walker     Fall Risk:  Fall Risk Assessment, last 12 mths 11/21/2022   Able to walk? Yes   Fall in past 12 months? 0   Do you feel unsteady? 0   Are you worried about falling 0   Is TUG test greater than 12 seconds? -   Number of falls in past 12 months -   Fall with injury?  -      Abuse Screen:  Patient is not abused       Cognitive Screening    Has your family/caregiver stated any concerns about your memory: no    Cognitive Screening: not done today    Health Maintenance Due     Health Maintenance Due   Topic Date Due    Shingles Vaccine (1 of 2) Never done    COVID-19 Vaccine (4 - Booster for Pfizer series) 07/13/2022       Patient Care Team   Patient Care Team:  Farschweiler, Devin Gonzales MD as PCP - General (Internal Medicine Physician)  Lashawn Jean-Baptiste MD as PCP - [facility]  Sathya Santos MD (Ophthalmology)  Yesenia Johnson MD as Physician (Otolaryngology)  Chery Tejada RN as Ambulatory Care Manager    History     Patient Active Problem List   Diagnosis Code    Essential hypertension I10    Osteoporosis without current pathological fracture M81.0    Hyperlipidemia E78.5    Primary osteoarthritis of both knees M17.0    Advanced directives, counseling/discussion Z71.89    Overweight (BMI 25.0-29. 9) E66.3    Type 2 diabetes mellitus with hyperglycemia, without long-term current use of insulin ([facility]) E11.65    CKD (chronic kidney disease) stage 3, GFR 30-59 ml/min ([facility]) N18.30    Type 2 diabetes with nephropathy ([facility]) E11.21    PSVT (paroxysmal supraventricular tachycardia) ([facility]) I47.1    Mass of parotid gland K11.8    Dementia without behavioral disturbance ([facility]) F03.90    Rectal hemorrhage K62.5    Acquired hypothyroidism E03.9    Chronic renal disease, stage III N18.30     Past Medical History:   Diagnosis Date    Acquired hypothyroidism 5/12/2022    Diabetes ([facility]     Essential hypertension 5/25/2017    Hyperlipidemia 5/25/2017    Paroxysmal tachycardia ([facility] 9/18/2018    Primary osteoarthritis of both knees       Past Surgical History:   Procedure Laterality Date    HX HYSTERECTOMY       Current Outpatient Medications   Medication Sig Dispense Refill    albuterol (PROVENTIL HFA, VENTOLIN HFA, PROAIR HFA) 90 mcg/actuation inhaler Take 1 Puff by inhalation every six (6) hours as needed for Shortness of Breath. 18 g 0    benzonatate (TESSALON) 100 mg capsule Take 1 Capsule by mouth three (3) times daily as needed for Cough for up to 10 days. 30 Capsule 0    doxycycline (ADOXA) 100 mg tablet Take 1 Tablet by mouth two (2) times a day for 10 days. 20 Tablet 0    montelukast (SINGULAIR) 10 mg tablet Take 1 Tablet by mouth daily.  30 Tablet 3    donepeziL (ARICEPT) 10 mg tablet Take 1 Tablet by mouth nightly. 90 Tablet 1    aspirin delayed-release 81 mg tablet Take 81 mg by mouth daily. metoprolol tartrate (LOPRESSOR) 25 mg tablet Take 0.5 Tablets by mouth two (2) times a day. (Patient taking differently: Take 12.5 mg by mouth two (2) times a day. Takes 1 whole pill during the day and 0.5 tab nightly) 180 Tablet 1    acetaminophen (TYLENOL) 325 mg tablet Take  by mouth every four (4) hours as needed for Pain. No Known Allergies    Family History   Problem Relation Age of Onset    Kidney Disease Mother     Hypertension Sister     Kidney Disease Brother      Social History     Tobacco Use    Smoking status: Never    Smokeless tobacco: Never   Substance Use Topics    Alcohol use: No       Alba Pringle, was evaluated through a synchronous (real-time) audio-video encounter. The patient (or guardian if applicable) is aware that this is a billable service, which includes applicable co-pays. This Virtual Visit was conducted with patient's (and/or legal guardian's) consent. The visit was conducted pursuant to the emergency declaration under the [facility], [address] [place] [address] authority and the [employer] General Act. Patient identification was verified, and a caregiver was present when appropriate. The patient was located at: Home: [address] [place] [address]  The provider was located at:  Facility (Appt Department): [facility]  [address] [place] [address] [facility]       Vernon Esparza MD

## 2023-01-12 ENCOUNTER — PATIENT OUTREACH (OUTPATIENT)
Dept: CASE MANAGEMENT | Age: 88
End: 2023-01-12

## 2023-01-12 NOTE — PROGRESS NOTES
.Date/Time:  1/12/2023 3:24 PM    Method of communication with patient:phone    [facility] ( [facility]) made out reach to  caregiver by telephone to offer Complex Case Management  ( CCM). Provided introduction to self, and explanation of the Ambulatory Care . Contact at [phone] anytime Monday thru Friday 08:00-4:30.

## 2023-01-17 DIAGNOSIS — R11.2 NAUSEA AND VOMITING, UNSPECIFIED VOMITING TYPE: Primary | ICD-10-CM

## 2023-01-17 RX ORDER — ONDANSETRON 4 MG/1
4 TABLET, ORALLY DISINTEGRATING ORAL
Qty: 10 TABLET | Refills: 0 | Status: SHIPPED | OUTPATIENT
Start: 2023-01-17

## 2023-01-19 ENCOUNTER — VIRTUAL VISIT (OUTPATIENT)
Dept: FAMILY MEDICINE CLINIC | Age: 88
End: 2023-01-19
Payer: MEDICARE

## 2023-01-19 DIAGNOSIS — I10 ESSENTIAL HYPERTENSION: ICD-10-CM

## 2023-01-19 DIAGNOSIS — R05.2 SUBACUTE COUGH: Primary | ICD-10-CM

## 2023-01-19 DIAGNOSIS — K11.1 PAROTID GLAND ENLARGEMENT: ICD-10-CM

## 2023-01-19 DIAGNOSIS — R11.2 NAUSEA AND VOMITING, UNSPECIFIED VOMITING TYPE: ICD-10-CM

## 2023-01-19 PROBLEM — Z71.89 ADVANCED DIRECTIVES, COUNSELING/DISCUSSION: Status: RESOLVED | Noted: 2017-10-10 | Resolved: 2023-01-19

## 2023-01-19 PROBLEM — K62.5 RECTAL HEMORRHAGE: Status: RESOLVED | Noted: 2021-11-01 | Resolved: 2023-01-19

## 2023-01-19 PROCEDURE — 99214 OFFICE O/P EST MOD 30 MIN: CPT | Performed by: INTERNAL MEDICINE

## 2023-01-19 PROCEDURE — 1123F ACP DISCUSS/DSCN MKR DOCD: CPT | Performed by: INTERNAL MEDICINE

## 2023-01-19 PROCEDURE — 1090F PRES/ABSN URINE INCON ASSESS: CPT | Performed by: INTERNAL MEDICINE

## 2023-01-19 PROCEDURE — G8427 DOCREV CUR MEDS BY ELIG CLIN: HCPCS | Performed by: INTERNAL MEDICINE

## 2023-01-19 PROCEDURE — G8432 DEP SCR NOT DOC, RNG: HCPCS | Performed by: INTERNAL MEDICINE

## 2023-01-19 PROCEDURE — 1101F PT FALLS ASSESS-DOCD LE1/YR: CPT | Performed by: INTERNAL MEDICINE

## 2023-01-19 RX ORDER — METHYLPREDNISOLONE 4 MG/1
TABLET ORAL
Qty: 1 DOSE PACK | Refills: 0 | Status: SHIPPED | OUTPATIENT
Start: 2023-01-19

## 2023-01-19 RX ORDER — METOPROLOL TARTRATE 25 MG/1
TABLET, FILM COATED ORAL
Qty: 180 TABLET | Refills: 1 | Status: SHIPPED | OUTPATIENT
Start: 2023-01-19

## 2023-01-19 RX ORDER — HYDROCODONE POLISTIREX AND CHLORPHENIRAMINE POLISTIREX 10; 8 MG/5ML; MG/5ML
1 SUSPENSION, EXTENDED RELEASE ORAL
Qty: 100 ML | Refills: 0 | Status: SHIPPED | OUTPATIENT
Start: 2023-01-19 | End: 2023-01-29

## 2023-01-19 NOTE — PROGRESS NOTES
Derrick Rankin is a 80 y.o. female who was seen by synchronous (real-time) audio-video technology on 1/19/2023 for Cough (X 1-2 weeks) and Skin Problem (Spot on face right side jaw/ear area swollen)        Assessment & Plan:   Diagnoses and all orders for this visit:    1. Subacute cough-finish course of antibiotics; continue with prn use of the Albuterol inhaler and will add Medrol; tessalon did not help with her fits of coughing so will have her try Tussionex-caregiver is aware that this med may cause confusion in elderly  -     methylPREDNISolone (MEDROL DOSEPACK) 4 mg tablet; As directed in the pack  -     HYDROcodone-chlorpheniramine (TUSSIONEX) 10-8 mg/5 mL suspension; Take 5 mL by mouth every twelve (12) hours as needed for Cough for up to 10 days. Max Daily Amount: 10 mL.    2. Nausea and vomiting, unspecified vomiting type-likely from the coughing fits; better today with prn Zofran    3. Essential hypertension-continue with present med; sees Cardio for this too  -     metoprolol tartrate (LOPRESSOR) 25 mg tablet; Takes 1 whole pill during the day and 0.5 tab nightly    4. Parotid gland enlargement-advised to apply warm compress to this area; caregiver reassured this has been eval in the past with CT scan and we all decided against having a biopsy bec of the patient's age    Follow-up and Dispositions    Return for previous appt. Subjective:     Health Maintenance Due   Topic Date Due    Shingles Vaccine (1 of 2) Never done    COVID-19 Vaccine (4 - Booster for Pfizer series) 07/13/2022        Prior to Admission medications    Medication Sig Start Date End Date Taking? Authorizing Provider   ondansetron (ZOFRAN ODT) 4 mg disintegrating tablet Take 1 Tablet by mouth every eight (8) hours as needed for Nausea or Vomiting.  1/17/23  Yes Marcie Alberto MD   albuterol (PROVENTIL HFA, VENTOLIN HFA, PROAIR HFA) 90 mcg/actuation inhaler Take 1 Puff by inhalation every six (6) hours as needed for Shortness of Breath. 1/9/23  Yes Marcie Alberto MD   doxycycline (ADOXA) 100 mg tablet Take 1 Tablet by mouth two (2) times a day for 10 days. 1/9/23 1/19/23 Yes Marcie Alberto MD   montelukast (SINGULAIR) 10 mg tablet Take 1 Tablet by mouth daily. 12/13/22  Yes Marcie Alberto MD   donepeziL (ARICEPT) 10 mg tablet Take 1 Tablet by mouth nightly. 11/3/22  Yes Litzy Rdz MD   aspirin delayed-release 81 mg tablet Take 81 mg by mouth daily. Yes Provider, Historical   metoprolol tartrate (LOPRESSOR) 25 mg tablet Take 0.5 Tablets by mouth two (2) times a day. Patient taking differently: Take 12.5 mg by mouth two (2) times a day. Takes 1 whole pill during the day and 0.5 tab nightly 12/30/21  Yes Abelino Rick MD   acetaminophen (TYLENOL) 325 mg tablet Take  by mouth every four (4) hours as needed for Pain. Yes Provider, Historical   benzonatate (TESSALON) 100 mg capsule Take 1 Capsule by mouth three (3) times daily as needed for Cough for up to 10 days. 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    Type 2 diabetes with nephropathy ([address] 09/26/2018    PSVT (paroxysmal supraventricular tachycardia) ([facility] 09/18/2018    CKD (chronic kidney disease) stage 3, GFR 30-59 ml/min ([address] 05/15/2018    Overweight (BMI 25.0-29.9) 01/09/2018    Essential hypertension 05/25/2017    Osteoporosis without current pathological fracture 05/25/2017    Hyperlipidemia 05/25/2017    Primary osteoarthritis of both knees 05/25/2017       ROS  Pt denies: Wt loss, Fever/Chills, HA, Visual changes, Fatigue, Chest pain, SOB, SALOMON, Abd pain, N/V/D/C, Blood in stool or urine, Edema. Pertinent positive as above in HPI.  All others were negative   Objective:     Patient-Reported Vitals 10/21/2020 Patient-Reported Weight 162   Patient-Reported Pulse -   Patient-Reported Systolic  714   Patient-Reported Diastolic 74      General: alert, cooperative, no distress   Mental  status: normal mood, behavior, speech, dress, motor activity, and thought processes, able to follow commands   HENT: NCAT   Neck: no visualized mass   Resp: no respiratory distress   Neuro: no gross deficits   Skin: no discoloration or lesions of concern on visible areas   Psychiatric: normal affect, consistent with stated mood, no evidence of hallucinations     Additional exam findings: dry cough noted and enlargement on the right upper jaw      We discussed the expected course, resolution and complications of the diagnosis(es) in detail. Medication risks, benefits, costs, interactions, and alternatives were discussed as indicated. I advised her to contact the office if her condition worsens, changes or fails to improve as anticipated. She expressed understanding with the diagnosis(es) and plan. Araceli Babinski, was evaluated through a synchronous (real-time) audio-video encounter. The patient (or guardian if applicable) is aware that this is a billable service, which includes applicable co-pays. This Virtual Visit was conducted with patient's (and/or legal guardian's) consent. The visit was conducted pursuant to the emergency declaration under the [facility], [address] [place] [address] authority and the [employer] General Act. Patient identification was verified, and a caregiver was present when appropriate. The patient was located at: Home: [address] [place] [address]  The provider was located at:  Facility (Appt Department): [facility]  [address] [place] [address] [facility]        Radha Palacios MD

## 2023-01-19 NOTE — PROGRESS NOTES
1. \"Have you been to the ER, urgent care clinic since your last visit? Hospitalized since your last visit? \" No    2. \"Have you seen or consulted any other health care providers outside of the [address] [place] [address] since your last visit? \" No     3. For patients aged 39-70: Has the patient had a colonoscopy / FIT/ Cologuard? NA - based on age      If the patient is female:    4. For patients aged 41-77: Has the patient had a mammogram within the past 2 years? NA - based on age or sex      11. For patients aged 21-65: Has the patient had a pap smear?  NA - based on age or sex

## 2023-02-23 ENCOUNTER — PATIENT OUTREACH (OUTPATIENT)
Dept: CASE MANAGEMENT | Age: 88
End: 2023-02-23

## 2023-02-23 NOTE — PROGRESS NOTES
.Ambulatory Care Management Note    Date/Time:  2/23/2023 11:10 AM    Patient Current Location: [place]    Patient has graduated from the Complex Case Management  program on 2/23/23. Patient/family has the ability to self-manage at this time. Care management goals have been completed. No further Ambulatory Care Manager follow up scheduled. Goals Addressed    None         Patient has Ambulatory Care Manager's contact information for any further questions, concerns, or needs. Patients upcoming visits:  No future appointments.

## 2023-03-20 ENCOUNTER — CARE COORDINATION (OUTPATIENT)
Facility: CLINIC | Age: 88
End: 2023-03-20

## 2023-03-20 NOTE — CARE COORDINATION
.Patient has graduated from the Complex Care program on 3/20/23. Patient/family has the ability to self-manage at this time. Care management goals have been completed. No further Ambulatory Care Manager follow up scheduled. Goals Addressed    None         Patient has Ambulatory Care Manager's contact information for any further questions, concerns, or needs.   Patients upcoming visits:    Future Appointments   Date Time Provider Syed Mitchell   4/24/2023 12:30 PM El Bowens MD AM BS AMB   10/3/2023 10:00 AM Jeovnany Ricks MD [facility] BS AMB

## 2023-04-22 SDOH — ECONOMIC STABILITY: HOUSING INSECURITY
IN THE LAST 12 MONTHS, WAS THERE A TIME WHEN YOU DID NOT HAVE A STEADY PLACE TO SLEEP OR SLEPT IN A SHELTER (INCLUDING NOW)?: NO

## 2023-04-22 SDOH — ECONOMIC STABILITY: FOOD INSECURITY: WITHIN THE PAST 12 MONTHS, THE FOOD YOU BOUGHT JUST DIDN'T LAST AND YOU DIDN'T HAVE MONEY TO GET MORE.: NEVER TRUE

## 2023-04-22 SDOH — ECONOMIC STABILITY: INCOME INSECURITY: HOW HARD IS IT FOR YOU TO PAY FOR THE VERY BASICS LIKE FOOD, HOUSING, MEDICAL CARE, AND HEATING?: NOT VERY HARD

## 2023-04-22 SDOH — ECONOMIC STABILITY: FOOD INSECURITY: WITHIN THE PAST 12 MONTHS, YOU WORRIED THAT YOUR FOOD WOULD RUN OUT BEFORE YOU GOT MONEY TO BUY MORE.: NEVER TRUE

## 2023-04-22 SDOH — ECONOMIC STABILITY: TRANSPORTATION INSECURITY
IN THE PAST 12 MONTHS, HAS LACK OF TRANSPORTATION KEPT YOU FROM MEETINGS, WORK, OR FROM GETTING THINGS NEEDED FOR DAILY LIVING?: NO

## 2023-04-24 ENCOUNTER — OFFICE VISIT (OUTPATIENT)
Facility: CLINIC | Age: 88
End: 2023-04-24
Payer: MEDICARE

## 2023-04-24 VITALS
OXYGEN SATURATION: 99 % | HEART RATE: 48 BPM | HEIGHT: 61 IN | DIASTOLIC BLOOD PRESSURE: 85 MMHG | TEMPERATURE: 98.3 F | SYSTOLIC BLOOD PRESSURE: 187 MMHG | BODY MASS INDEX: 27.38 KG/M2 | WEIGHT: 145 LBS | RESPIRATION RATE: 14 BRPM

## 2023-04-24 DIAGNOSIS — K59.00 CONSTIPATION, UNSPECIFIED CONSTIPATION TYPE: ICD-10-CM

## 2023-04-24 DIAGNOSIS — K11.1 HYPERTROPHY OF SALIVARY GLAND: ICD-10-CM

## 2023-04-24 DIAGNOSIS — E11.29 TYPE 2 DIABETES MELLITUS WITH OTHER DIABETIC KIDNEY COMPLICATION (HCC): ICD-10-CM

## 2023-04-24 DIAGNOSIS — E03.9 HYPOTHYROIDISM, UNSPECIFIED TYPE: ICD-10-CM

## 2023-04-24 DIAGNOSIS — K57.90 DIVERTICULOSIS: ICD-10-CM

## 2023-04-24 DIAGNOSIS — E78.5 HYPERLIPIDEMIA, UNSPECIFIED HYPERLIPIDEMIA TYPE: ICD-10-CM

## 2023-04-24 DIAGNOSIS — I10 ESSENTIAL (PRIMARY) HYPERTENSION: Primary | ICD-10-CM

## 2023-04-24 DIAGNOSIS — I47.1 SUPRAVENTRICULAR TACHYCARDIA (HCC): ICD-10-CM

## 2023-04-24 DIAGNOSIS — F03.90 DEMENTIA WITHOUT BEHAVIORAL DISTURBANCE (HCC): ICD-10-CM

## 2023-04-24 PROCEDURE — 1036F TOBACCO NON-USER: CPT | Performed by: INTERNAL MEDICINE

## 2023-04-24 PROCEDURE — 1090F PRES/ABSN URINE INCON ASSESS: CPT | Performed by: INTERNAL MEDICINE

## 2023-04-24 PROCEDURE — 1123F ACP DISCUSS/DSCN MKR DOCD: CPT | Performed by: INTERNAL MEDICINE

## 2023-04-24 PROCEDURE — G8427 DOCREV CUR MEDS BY ELIG CLIN: HCPCS | Performed by: INTERNAL MEDICINE

## 2023-04-24 PROCEDURE — G8417 CALC BMI ABV UP PARAM F/U: HCPCS | Performed by: INTERNAL MEDICINE

## 2023-04-24 PROCEDURE — 99214 OFFICE O/P EST MOD 30 MIN: CPT | Performed by: INTERNAL MEDICINE

## 2023-04-24 ASSESSMENT — PATIENT HEALTH QUESTIONNAIRE - PHQ9
SUM OF ALL RESPONSES TO PHQ QUESTIONS 1-9: 0
SUM OF ALL RESPONSES TO PHQ9 QUESTIONS 1 & 2: 0
1. LITTLE INTEREST OR PLEASURE IN DOING THINGS: 0
SUM OF ALL RESPONSES TO PHQ QUESTIONS 1-9: 0
2. FEELING DOWN, DEPRESSED OR HOPELESS: 0

## 2023-04-24 NOTE — PROGRESS NOTES
1. \"Have you been to the ER, urgent care clinic since your last visit? Hospitalized since your last visit? \" Yes went to ER for Constipation 1-23-23    2. \"Have you seen or consulted any other health care providers outside of the [address] [place] [address] since your last visit? \" No    3. For patients aged 39-70: Has the patient had a colonoscopy / FIT/ Cologuard? N/A      If the patient is female:    4. For patients aged 41-77: Has the patient had a mammogram within the past 2 years? NA - based on age or sex    11. For patients aged 21-65: Has the patient had a pap smear?  NA - based on age or sex    Health Maintenance Due   Topic Date Due    Shingles vaccine (2 of 3) 02/26/2016    COVID-19 Vaccine (4 - Booster for Pfizer series) 07/13/2022

## 2023-04-24 NOTE — PROGRESS NOTES
Assessment/ Plan: Rosendo Terry was seen today for follow-up chronic condition. Diagnoses and all orders for this visit:    Essential (primary) hypertension-elevated today; caregiver who is a nurse will send me BP readings in the next 5 days; will consider switching to Cardizem if BP is still elev and HR is already below 50  -     CBC; Future  -     Comprehensive Metabolic Panel; Future    Supraventricular tachycardia (HCC)-on beta blocker, Cardio following    Type 2 diabetes mellitus with other diabetic kidney complication (HCC)-currently not on meds  -     Hemoglobin A1C; Future    Hypertrophy of salivary gland-intermittent swelling on the right parotid area/no pain; both patient and caregiver are in agreement for no further diagnostics/referral to ENT    Constipation, unspecified constipation type-advised to take Miralax prn    Diverticulosis-avoid constipation    Hypothyroidism, unspecified type-not on meds currently  -     TSH; Future    Hyperlipidemia, unspecified hyperlipidemia type-off statin bec of age  -     Lipid Panel; Future    Dementia without behavioral disturbance (HCC)-sxs stable on Aricept        Follow-up and Dispositions    Return in about 3 months (around 7/24/2023) for follow up. Chief Complaint   Patient presents with    Follow-up Chronic Condition     3 month       Pt is a 80y.o. year old female who presents for follow up of her chronic medical problems    Health Maintenance Due   Topic Date Due    Shingles vaccine (2 of 3) 02/26/2016    COVID-19 Vaccine (4 - Booster for Pfizer series) 07/13/2022      BP Readings from Last 3 Encounters:   04/24/23 (!) 187/85   10/11/22 137/77   09/29/22 122/78    Took meds today? Yes  Home Bps-normal  Repeat BP- still elev    Saw Cardio 9/2022:   IMPRESSION & PLAN:   John Davis is 80 y.o. female with multiple medical problem      Hypertension: /78. Currently on metoprolol. Continue same      Hyperlipidemia: Last LDL 85.   She used to be

## 2023-04-25 LAB
ALBUMIN SERPL-MCNC: 3.9 G/DL (ref 3.5–4.6)
ALBUMIN/GLOB SERPL: 1.4 {RATIO} (ref 1.2–2.2)
ALP SERPL-CCNC: 91 IU/L (ref 44–121)
ALT SERPL-CCNC: 10 IU/L (ref 0–32)
AST SERPL-CCNC: 16 IU/L (ref 0–40)
BASOPHILS # BLD AUTO: 0.1 X10E3/UL (ref 0–0.2)
BASOPHILS NFR BLD AUTO: 1 %
BILIRUB SERPL-MCNC: 0.3 MG/DL (ref 0–1.2)
BUN SERPL-MCNC: 11 MG/DL (ref 10–36)
BUN/CREAT SERPL: 11 (ref 12–28)
CALCIUM SERPL-MCNC: 9.1 MG/DL (ref 8.7–10.3)
CHLORIDE SERPL-SCNC: 107 MMOL/L (ref 96–106)
CHOLEST SERPL-MCNC: 225 MG/DL (ref 100–199)
CO2 SERPL-SCNC: 26 MMOL/L (ref 20–29)
CREAT SERPL-MCNC: 0.97 MG/DL (ref 0.57–1)
EGFRCR SERPLBLD CKD-EPI 2021: 54 ML/MIN/1.73
EOSINOPHIL # BLD AUTO: 0 X10E3/UL (ref 0–0.4)
EOSINOPHIL NFR BLD AUTO: 1 %
ERYTHROCYTE [DISTWIDTH] IN BLOOD BY AUTOMATED COUNT: 14.9 % (ref 11.7–15.4)
GLOBULIN SER CALC-MCNC: 2.7 G/DL (ref 1.5–4.5)
GLUCOSE SERPL-MCNC: 82 MG/DL (ref 70–99)
HBA1C MFR BLD: 5.9 % (ref 4.8–5.6)
HCT VFR BLD AUTO: 38.7 % (ref 34–46.6)
HDLC SERPL-MCNC: 48 MG/DL
HGB BLD-MCNC: 12.5 G/DL (ref 11.1–15.9)
IMM GRANULOCYTES # BLD AUTO: 0 X10E3/UL (ref 0–0.1)
IMM GRANULOCYTES NFR BLD AUTO: 0 %
LDLC SERPL CALC-MCNC: 147 MG/DL (ref 0–99)
LYMPHOCYTES # BLD AUTO: 2 X10E3/UL (ref 0.7–3.1)
LYMPHOCYTES NFR BLD AUTO: 38 %
MCH RBC QN AUTO: 25.7 PG (ref 26.6–33)
MCHC RBC AUTO-ENTMCNC: 32.3 G/DL (ref 31.5–35.7)
MCV RBC AUTO: 80 FL (ref 79–97)
MONOCYTES # BLD AUTO: 0.4 X10E3/UL (ref 0.1–0.9)
MONOCYTES NFR BLD AUTO: 8 %
NEUTROPHILS # BLD AUTO: 2.7 X10E3/UL (ref 1.4–7)
NEUTROPHILS NFR BLD AUTO: 52 %
PLATELET # BLD AUTO: 256 X10E3/UL (ref 150–450)
POTASSIUM SERPL-SCNC: 4.5 MMOL/L (ref 3.5–5.2)
PROT SERPL-MCNC: 6.6 G/DL (ref 6–8.5)
RBC # BLD AUTO: 4.86 X10E6/UL (ref 3.77–5.28)
SODIUM SERPL-SCNC: 147 MMOL/L (ref 134–144)
SPECIMEN STATUS REPORT: NORMAL
TRIGL SERPL-MCNC: 165 MG/DL (ref 0–149)
TSH SERPL DL<=0.005 MIU/L-ACNC: 4.69 UIU/ML (ref 0.45–4.5)
VLDLC SERPL CALC-MCNC: 30 MG/DL (ref 5–40)
WBC # BLD AUTO: 5.2 X10E3/UL (ref 3.4–10.8)

## 2023-05-03 RX ORDER — DONEPEZIL HYDROCHLORIDE 10 MG/1
10 TABLET, FILM COATED ORAL NIGHTLY
Qty: 90 TABLET | Refills: 1 | Status: SHIPPED | OUTPATIENT
Start: 2023-05-03

## 2023-05-10 DIAGNOSIS — I10 ESSENTIAL (PRIMARY) HYPERTENSION: Primary | ICD-10-CM

## 2023-05-10 RX ORDER — AMLODIPINE BESYLATE 2.5 MG/1
2.5 TABLET ORAL DAILY
Qty: 90 TABLET | Refills: 1 | Status: SHIPPED | OUTPATIENT
Start: 2023-05-10

## 2023-07-24 ENCOUNTER — OFFICE VISIT (OUTPATIENT)
Facility: CLINIC | Age: 88
End: 2023-07-24
Payer: MEDICARE

## 2023-07-24 VITALS
BODY MASS INDEX: 27.38 KG/M2 | HEART RATE: 48 BPM | DIASTOLIC BLOOD PRESSURE: 74 MMHG | HEIGHT: 61 IN | TEMPERATURE: 98.3 F | WEIGHT: 145 LBS | RESPIRATION RATE: 14 BRPM | OXYGEN SATURATION: 96 % | SYSTOLIC BLOOD PRESSURE: 128 MMHG

## 2023-07-24 DIAGNOSIS — E11.29 TYPE 2 DIABETES MELLITUS WITH OTHER DIABETIC KIDNEY COMPLICATION (HCC): ICD-10-CM

## 2023-07-24 DIAGNOSIS — F03.90 DEMENTIA WITHOUT BEHAVIORAL DISTURBANCE (HCC): ICD-10-CM

## 2023-07-24 DIAGNOSIS — E78.5 HYPERLIPIDEMIA, UNSPECIFIED HYPERLIPIDEMIA TYPE: ICD-10-CM

## 2023-07-24 DIAGNOSIS — I10 ESSENTIAL HYPERTENSION: Primary | ICD-10-CM

## 2023-07-24 DIAGNOSIS — I47.1 PSVT (PAROXYSMAL SUPRAVENTRICULAR TACHYCARDIA) (HCC): ICD-10-CM

## 2023-07-24 DIAGNOSIS — E03.9 ACQUIRED HYPOTHYROIDISM: ICD-10-CM

## 2023-07-24 PROCEDURE — 1123F ACP DISCUSS/DSCN MKR DOCD: CPT | Performed by: INTERNAL MEDICINE

## 2023-07-24 PROCEDURE — 99214 OFFICE O/P EST MOD 30 MIN: CPT | Performed by: INTERNAL MEDICINE

## 2023-07-24 PROCEDURE — 1036F TOBACCO NON-USER: CPT | Performed by: INTERNAL MEDICINE

## 2023-07-24 PROCEDURE — 3044F HG A1C LEVEL LT 7.0%: CPT | Performed by: INTERNAL MEDICINE

## 2023-07-24 PROCEDURE — G8427 DOCREV CUR MEDS BY ELIG CLIN: HCPCS | Performed by: INTERNAL MEDICINE

## 2023-07-24 PROCEDURE — G8417 CALC BMI ABV UP PARAM F/U: HCPCS | Performed by: INTERNAL MEDICINE

## 2023-07-24 PROCEDURE — 1090F PRES/ABSN URINE INCON ASSESS: CPT | Performed by: INTERNAL MEDICINE

## 2023-07-24 ASSESSMENT — PATIENT HEALTH QUESTIONNAIRE - PHQ9
SUM OF ALL RESPONSES TO PHQ QUESTIONS 1-9: 1
1. LITTLE INTEREST OR PLEASURE IN DOING THINGS: 0
SUM OF ALL RESPONSES TO PHQ9 QUESTIONS 1 & 2: 1
SUM OF ALL RESPONSES TO PHQ QUESTIONS 1-9: 1
SUM OF ALL RESPONSES TO PHQ QUESTIONS 1-9: 1
2. FEELING DOWN, DEPRESSED OR HOPELESS: 1
SUM OF ALL RESPONSES TO PHQ QUESTIONS 1-9: 1

## 2023-07-24 NOTE — PROGRESS NOTES
Assessment/ Plan: [facility] [place] was seen today for follow-up chronic condition. 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)-D4H at goal on diet alone  -     AMB POC HEMOGLOBIN A1C  Hemoglobin A1C   Date Value Ref Range Status   04/24/2023 5.9 (H) 4.8 - 5.6 % Final     Comment:              Prediabetes: 5.7 - 6.4           Diabetes: >6.4           Glycemic control for adults with diabetes: <7.0       Hemoglobin A1C, POC   Date Value Ref Range Status   10/11/2022 5.6 % Final      Hyperlipidemia, unspecified hyperlipidemia type-off statin bec of age    PSVT (paroxysmal supraventricular tachycardia) (HCC)-continue with Metoprolol; Cardio following    Dementia without behavioral disturbance (HCC)-slow progression, continue with Aricept    Acquired hypothyroidism-recheck TSH next visit; improved from 8  Lab Results   Component Value Date    TSH 4.690 (H) 04/24/2023            FMLA form for her grand daughter filed out today    Follow-up and Dispositions    Return in about 6 months (around 1/24/2024) for follow up.                    Chief Complaint   Patient presents with    Follow-up Chronic Condition       Pt is a 80y.o. year old female who presents for follow up of her chronic medical problems    Health Maintenance Due   Topic Date Due    Shingles vaccine (2 of 3) 02/26/2016    COVID-19 Vaccine (4 - Booster for Pfizer series) 07/13/2022      Wt Readings from Last 3 Encounters:   07/24/23 145 lb (65.8 kg)   04/24/23 145 lb (65.8 kg)   10/11/22 150 lb 9.6 oz (68.3 kg)        BP Readings from Last 3 Encounters:   07/24/23 128/74   04/24/23 (!) 187/85   10/11/22 137/77     Lab Results   Component Value Date/Time    CHOL 225 04/24/2023 12:00 AM    CHOL 147 09/07/2021 12:00 AM    TRIG 165 04/24/2023 12:00 AM    HDL 48 04/24/2023 12:00 AM    LDLCALC 147 04/24/2023 12:00 AM    Took meds

## 2023-07-24 NOTE — PROGRESS NOTES
1. \"Have you been to the ER, urgent care clinic since your last visit? Hospitalized since your last visit? \" No    2. \"Have you seen or consulted any other health care providers outside of the [address] [place] [address] since your last visit? \" No    3. For patients aged 43-73: Has the patient had a colonoscopy / FIT/ Cologuard? N/A      If the patient is female:    4. For patients aged 43-66: Has the patient had a mammogram within the past 2 years? NA - based on age or sex    11. For patients aged 21-65: Has the patient had a pap smear?  NA - based on age or sex    Health Maintenance Due   Topic Date Due    Shingles vaccine (2 of 3) 02/26/2016    COVID-19 Vaccine (4 - Booster for Pfizer series) 07/13/2022

## 2023-08-17 ENCOUNTER — CARE COORDINATION (OUTPATIENT)
Facility: CLINIC | Age: 88
End: 2023-08-17

## 2023-08-17 PROBLEM — K62.5 RECTAL HEMORRHAGE: Status: ACTIVE | Noted: 2021-11-01

## 2023-08-17 PROBLEM — K11.8 MASS OF PAROTID GLAND: Status: ACTIVE | Noted: 2019-01-08

## 2023-08-17 PROBLEM — I47.9 PAROXYSMAL TACHYCARDIA (HCC): Status: ACTIVE | Noted: 2018-09-18

## 2023-08-17 ASSESSMENT — PATIENT HEALTH QUESTIONNAIRE - PHQ9
SUM OF ALL RESPONSES TO PHQ QUESTIONS 1-9: 0

## 2023-08-17 NOTE — CARE COORDINATION
.Ambulatory Care Coordination Note  2023    Patient Current Location:  Home: [address] [place] [address]    ACM contacted the caregiver ,granddaughter ,Ms Funmilayo Fournier POA/PHI by telephone. Verified name and  with caregiver as identifiers. Provided introduction to self, and explanation of the ACM role. ACM spoke with granddaughter ,Funmilayo Fournier ,care giver, PHI. Patient is doing well, no falls ,appetite not good ,but she sit with patient to encourage eating as well as po intake. ACM will monitor oral intake and weight. Update provider for concerns. ACM: Eliceo Wyman RN    Challenges to be reviewed by the provider   Additional needs identified to be addressed with provider: No  none               Method of communication with provider: none. Offered patient enrollment in the Remote Patient Monitoring (RPM) program for in-home monitoring: Patient is not eligible for RPM program.    Ambulatory Care Coordination Assessment    Care Coordination Protocol  Referral from Primary Care Provider: No  Week 1 - Initial Assessment     Do you have all of your prescriptions and are they filled?: Yes (Comment: Granddabrissa Robertson makes ure patient takes medication Dementia)  Are you able to afford your medications?: Yes  How often do you have trouble taking your medications the way you have been told to take them?: I always take them as prescribed. Do you have Home O2 Therapy?: No      Ability to seek help/take action for Emergent Urgent situations i.e. fire, crime, inclement weather or health crisis. : Dependent  Ability to ambulate to restroom: Needs Assistance  Ability handle personal hygeine needs (bathing/dressing/grooming): Dependent  Ability to manage Medications: Dependent  Ability to prepare Food Preparation: Dependent  Ability to maintain home (clean home, laundry): Dependent  Ability to drive and/or has transportation: Dependent  Ability to do shopping:

## 2023-09-01 ENCOUNTER — CARE COORDINATION (OUTPATIENT)
Facility: CLINIC | Age: 88
End: 2023-09-01

## 2023-09-01 SDOH — HEALTH STABILITY: PHYSICAL HEALTH: ON AVERAGE, HOW MANY MINUTES DO YOU ENGAGE IN EXERCISE AT THIS LEVEL?: 20 MIN

## 2023-09-01 SDOH — ECONOMIC STABILITY: FOOD INSECURITY: WITHIN THE PAST 12 MONTHS, THE FOOD YOU BOUGHT JUST DIDN'T LAST AND YOU DIDN'T HAVE MONEY TO GET MORE.: NEVER TRUE

## 2023-09-01 SDOH — ECONOMIC STABILITY: HOUSING INSECURITY: IN THE LAST 12 MONTHS, HOW MANY PLACES HAVE YOU LIVED?: 1

## 2023-09-01 SDOH — ECONOMIC STABILITY: FOOD INSECURITY: WITHIN THE PAST 12 MONTHS, YOU WORRIED THAT YOUR FOOD WOULD RUN OUT BEFORE YOU GOT MONEY TO BUY MORE.: NEVER TRUE

## 2023-09-01 SDOH — HEALTH STABILITY: PHYSICAL HEALTH: ON AVERAGE, HOW MANY DAYS PER WEEK DO YOU ENGAGE IN MODERATE TO STRENUOUS EXERCISE (LIKE A BRISK WALK)?: 2 DAYS

## 2023-09-01 SDOH — ECONOMIC STABILITY: INCOME INSECURITY: IN THE LAST 12 MONTHS, WAS THERE A TIME WHEN YOU WERE NOT ABLE TO PAY THE MORTGAGE OR RENT ON TIME?: NO

## 2023-09-01 SDOH — ECONOMIC STABILITY: TRANSPORTATION INSECURITY
IN THE PAST 12 MONTHS, HAS THE LACK OF TRANSPORTATION KEPT YOU FROM MEDICAL APPOINTMENTS OR FROM GETTING MEDICATIONS?: NO

## 2023-09-01 ASSESSMENT — SOCIAL DETERMINANTS OF HEALTH (SDOH)
HOW OFTEN DO YOU GET TOGETHER WITH FRIENDS OR RELATIVES?: THREE TIMES A WEEK
WITHIN THE LAST YEAR, HAVE YOU BEEN KICKED, HIT, SLAPPED, OR OTHERWISE PHYSICALLY HURT BY YOUR PARTNER OR EX-PARTNER?: NO
DO YOU BELONG TO ANY CLUBS OR ORGANIZATIONS SUCH AS CHURCH GROUPS UNIONS, FRATERNAL OR ATHLETIC GROUPS, OR SCHOOL GROUPS?: NO
WITHIN THE LAST YEAR, HAVE YOU BEEN AFRAID OF YOUR PARTNER OR EX-PARTNER?: NO
WITHIN THE LAST YEAR, HAVE YOU BEEN HUMILIATED OR EMOTIONALLY ABUSED IN OTHER WAYS BY YOUR PARTNER OR EX-PARTNER?: NO
HOW OFTEN DO YOU ATTEND CHURCH OR RELIGIOUS SERVICES?: MORE THAN 4 TIMES PER YEAR
WITHIN THE LAST YEAR, HAVE TO BEEN RAPED OR FORCED TO HAVE ANY KIND OF SEXUAL ACTIVITY BY YOUR PARTNER OR EX-PARTNER?: NO
IN A TYPICAL WEEK, HOW MANY TIMES DO YOU TALK ON THE PHONE WITH FAMILY, FRIENDS, OR NEIGHBORS?: THREE TIMES A WEEK
HOW HARD IS IT FOR YOU TO PAY FOR THE VERY BASICS LIKE FOOD, HOUSING, MEDICAL CARE, AND HEATING?: NOT HARD AT ALL

## 2023-09-01 ASSESSMENT — LIFESTYLE VARIABLES
HOW OFTEN DO YOU HAVE A DRINK CONTAINING ALCOHOL: NEVER
HOW MANY STANDARD DRINKS CONTAINING ALCOHOL DO YOU HAVE ON A TYPICAL DAY: PATIENT DOES NOT DRINK

## 2023-09-01 NOTE — CARE COORDINATION
.Ambulatory Care Coordination Note  2023    Patient Current Location:  Home: [address] [address]     ACM contacted the family, caregiver, and Granddaughter  by telephone. Verified name and  with family, caregiver, and granddaughter  as identifiers. Provided introduction to self, and explanation of the ACM role. Patient is doing well. No falls not eating as much. ACM encourage fluid don't want her to get dehydrated, resulting in UTI. Granddaughter voiced understanding  a    Challenges to be reviewed by the provider   Additional needs identified to be addressed with provider: No  none               Method of communication with provider: none. ACM: Frank Wiseman RN    Offered patient enrollment in the Remote Patient Monitoring (RPM) program for in-home monitoring: NA.     Lab Results       None                 Goals Addressed                   This Visit's Progress     Self-schedules and keeps appointments         Take all medications as ordered               Future Appointments   Date Time Provider [address]   10/3/2023 10:00 AM MD TORIBIO Tolbert BS AMB

## 2023-09-08 ENCOUNTER — CARE COORDINATION (OUTPATIENT)
Facility: CLINIC | Age: 88
End: 2023-09-08

## 2023-09-08 NOTE — CARE COORDINATION
.Ambulatory Care Coordination Note  2023    Patient Current Location:  Home: [address] [place] [address]  [address] [address]     ACM contacted the patient and caregiver by telephone. Verified name and  with patient and caregiver as identifiers. Provided introduction to self, and explanation of the ACM role. Patient answered phone. Voicing she is doing fine taking her medications and walking around the house and where the children takes her. Denies pains or any discomfort. Granddaughter is upstairs. Challenges to be reviewed by the provider   Additional needs identified to be addressed with provider: No  none               Method of communication with provider: none. ACM: Brandi Ramos RN      Offered patient enrollment in the Remote Patient Monitoring (RPM) program for in-home monitoring: NA.     Lab Results       None                 Goals Addressed                   This Visit's Progress     Care Coordination Self Management   On track     CC Self Management Goal  Patient Goal (What steps will patient take to achieve goal?): >> Dependent on Granddaughter , Ms Karena Lanes  Patient is able to discuss self-management of condition(s):  Pt demonstrates adherence to medications  Pt demonstrates understanding of self-monitoring  Patient is able to identify Red Flags:  Alert to potential adverse drug reactions(s) or side effects and actions to take should they arise  Discuss target symptoms and actions to take should they arise  Identify problems that require immediate PCP or specialist visit  Patient demonstrates understanding of access to PCP/Specialist:  Understands about scheduling routine Follow Up appointments   Understands about sick day appointment options for worsening of symptoms/progression (Same Day, E Visits)  23  Ongoing       Take all medications as ordered   On track     23  Patient voicing she is taking all the medication that her granddaughter gives her              Future

## 2023-09-15 ENCOUNTER — CARE COORDINATION (OUTPATIENT)
Facility: CLINIC | Age: 88
End: 2023-09-15

## 2023-09-15 NOTE — CARE COORDINATION
.Ambulatory Care Coordination Note  9/15/2023    Patient Current Location:  Home: [address] [place] [address]  [address] [address]     ACM contacted the patient by telephone. Verified name and  with patient as identifiers. Provided introduction to self, and explanation of the ACM role. Patient voicing she is well. Did ACM wanted to speak to her granddaughter, who was in the kitchen. ACM voiced no as long as she was doing okay . Patient voiced she is fine just walking around the house and eating. Challenges to be reviewed by the provider   Additional needs identified to be addressed with provider: No  none               Method of communication with provider: none.     ACM: Chris Vergara RN    Offered patient enrollment in the Remote Patient Monitoring (RPM) program for in-home monitoring: Patient is not eligible for RPM program.    Lab Results       None                 Goals Addressed    None         Future Appointments   Date Time Provider [address] [place] [address]   10/3/2023 10:00 AM Jeovanny Ricks MD CAG BS AMB   2024 11:00 AM Ana Solomon MD AMA BS AMB

## 2023-09-29 ENCOUNTER — CARE COORDINATION (OUTPATIENT)
Facility: CLINIC | Age: 88
End: 2023-09-29

## 2023-09-29 NOTE — CARE COORDINATION
.Ambulatory Care Coordination Note  2023    Patient Current Location:  Home: [address] [place] [address]  [address] [address]     ACM contacted the patient by telephone. Verified name and  with patient as identifiers. Provided introduction to self, and explanation of the ACM role. Patient is doing well she voiced, denies headache, sob or dizziness ( S&S) of hypertension. She takes her medications as granddaughter give them to her. Challenges to be reviewed by the provider   Additional needs identified to be addressed with provider: No  none               Method of communication with provider: none.     ACM: Frank Wiseman RN    Offered patient enrollment in the Remote Patient Monitoring (RPM) program for in-home monitoring: Patient is not eligible for RPM program.    Lab Results       None                 Goals Addressed    None         Future Appointments   Date Time Provider [address] [place] [address]   2023  9:45 AM Jeovanny Díaz MD CAG BS AMB   2024 11:00 AM Concha Sifuentes MD AMA BS AMB

## 2023-10-10 ENCOUNTER — CARE COORDINATION (OUTPATIENT)
Facility: CLINIC | Age: 88
End: 2023-10-10

## 2023-10-10 NOTE — CARE COORDINATION
.Attempted to contact patient for [facility] ( High Risk Case Management ). No answer, left message with contact information provided. Will attempt to contact at a later time . EMR reviewed No ED or  hospital admissions.  Family patient cancelles Cardiology appointment 10/3 but is rescheduled for 11/3/23 Dr Kiara Florian

## 2023-10-25 ENCOUNTER — CARE COORDINATION (OUTPATIENT)
Facility: CLINIC | Age: 88
End: 2023-10-25

## 2023-10-25 NOTE — CARE COORDINATION
.Attempted to contact patient for [facility] ( High Risk Case Management ). No answer, left message with contact information provided. Will attempt to contact at a later time .  EMR reviewed No ED or hospital admission noted

## 2023-11-08 ENCOUNTER — CARE COORDINATION (OUTPATIENT)
Facility: CLINIC | Age: 88
End: 2023-11-08

## 2023-11-08 NOTE — CARE COORDINATION
.Ambulatory Care Coordination Note  2023    Patient Current Location:  Home: [address] [facility] [address]     ACM contacted the caregiver and Granddaughter Olamide NORTON  by telephone. Verified name and  with caregiver as identifiers. Provided introduction to self, and explanation of the ACM role. Patient is doing well, still likes to ambulate the stairs without assistance. No falls . Taking medications without difficulty . Has 1 year annual f/u with cardiology tomorrow her daughter will be taking her. Challenges to be reviewed by the provider   Additional needs identified to be addressed with provider: No  none               Method of communication with provider: none. ACM: Kristal Danielson RN    Offered patient enrollment in the Remote Patient Monitoring (RPM) program for in-home monitoring: NA. Lab Results       None            Care Coordination Interventions    Referral from Primary Care Provider: No  Suggested Interventions and Community Resources  Diabetes Education: In Process  Fall Risk Prevention:  In Process          Goals Addressed                   This Visit's Progress     Care Coordination Self Management   On track     CC Self Management Goal  Patient Goal (What steps will patient take to achieve goal?): >> Dependent on Granddaughter , Ms Liz Crum  Patient is able to discuss self-management of condition(s):  Pt demonstrates adherence to medications  Pt demonstrates understanding of self-monitoring  Patient is able to identify Red Flags:  Alert to potential adverse drug reactions(s) or side effects and actions to take should they arise  Discuss target symptoms and actions to take should they arise  Identify problems that require immediate PCP or specialist visit  Patient demonstrates understanding of access to PCP/Specialist:  Understands about scheduling routine Follow Up appointments   Understands about sick day appointment options for worsening of

## 2023-11-09 ENCOUNTER — OFFICE VISIT (OUTPATIENT)
Age: 88
End: 2023-11-09
Payer: MEDICARE

## 2023-11-09 VITALS
BODY MASS INDEX: 27.59 KG/M2 | HEART RATE: 58 BPM | SYSTOLIC BLOOD PRESSURE: 131 MMHG | WEIGHT: 146 LBS | OXYGEN SATURATION: 95 % | DIASTOLIC BLOOD PRESSURE: 84 MMHG

## 2023-11-09 DIAGNOSIS — E78.00 PURE HYPERCHOLESTEROLEMIA: ICD-10-CM

## 2023-11-09 DIAGNOSIS — R00.2 PALPITATIONS: ICD-10-CM

## 2023-11-09 DIAGNOSIS — I10 ESSENTIAL HYPERTENSION WITH GOAL BLOOD PRESSURE LESS THAN 140/90: Primary | ICD-10-CM

## 2023-11-09 PROCEDURE — 1090F PRES/ABSN URINE INCON ASSESS: CPT | Performed by: INTERNAL MEDICINE

## 2023-11-09 PROCEDURE — 1123F ACP DISCUSS/DSCN MKR DOCD: CPT | Performed by: INTERNAL MEDICINE

## 2023-11-09 PROCEDURE — G8428 CUR MEDS NOT DOCUMENT: HCPCS | Performed by: INTERNAL MEDICINE

## 2023-11-09 PROCEDURE — 99213 OFFICE O/P EST LOW 20 MIN: CPT | Performed by: INTERNAL MEDICINE

## 2023-11-09 PROCEDURE — G8417 CALC BMI ABV UP PARAM F/U: HCPCS | Performed by: INTERNAL MEDICINE

## 2023-11-09 PROCEDURE — G8484 FLU IMMUNIZE NO ADMIN: HCPCS | Performed by: INTERNAL MEDICINE

## 2023-11-09 PROCEDURE — 1036F TOBACCO NON-USER: CPT | Performed by: INTERNAL MEDICINE

## 2023-11-09 NOTE — PROGRESS NOTES
Identified pt with two pt identifiers(name and ). Reviewed record in preparation for visit and have obtained necessary documentation. Laurence Li presents today for   Chief Complaint   Patient presents with    Follow-up       Pt c/o SOB, SWELLING. Francisco Cordero preferred language for health care discussion is english/other. Personal Protective Equipment:   Personal Protective Equipment was used including: mask-surgical and hands-gloves. Patient was placed on no precaution(s). Patient was masked. Precautions:   Patient currently on None  Patient currently roomed with door closed. Is someone accompanying this pt? daughter    Is the patient using any DME equipment during OV? no    Depression Screenin/17/2023    10:16 AM 2023    12:38 PM 2023    12:53 PM 2022     3:44 PM 10/11/2022    11:00 AM 2021     9:17 AM 2021     1:21 PM   PHQ-9 Questionaire   Little interest or pleasure in doing things 0 0 0 0 0 0 0   Feeling down, depressed, or hopeless 0 1 0 0 0 0 0   PHQ-9 Total Score 0 1 0 0 0 0 0        Learning Assessment:  No question data found. Abuse Screenin/9/2023     5:00 PM   AMB Abuse Screening   Do you ever feel afraid of your partner? N   Are you in a relationship with someone who physically or mentally threatens you? N   Is it safe for you to go home? Y          Fall Risk      2023     5:29 PM 2023    10:10 AM   Fall Risk   2 or more falls in past year? no no   Fall with injury in past year? no no         Pt currently taking Anticoagulant therapy? no  Pt currently taking Antiplatelet therapy? no    Coordination of Care:  1. Have you been to the ER, urgent care clinic since your last visit? Hospitalized since your last visit? no    2. Have you seen or consulted any other health care providers outside of the [address] [place] [address] since your last visit? Include any pap smears or colon screening.  no      Please see Red
AM 9/7/2021    12:00 AM 1/8/2021    12:00 AM 2/27/2020    11:32 AM   Lipids   Chol 100 - 199 mg/dL 225   147  150     HDL >39 mg/dL 48   46  45     LDL Calc 0 - 99 mg/dL 147   83  84     VLDL Calc 5 - 40 mg/dL 30        Trig 0 - 149 mg/dL 165   96  116     ALT 0 - 32 IU/L 10  8  9  15  15    AST 0 - 40 IU/L 16  16  16  20  16      Lab Results   Component Value Date/Time    ALT 10 04/24/2023 12:00 AM     Hemoglobin A1C   Date Value Ref Range Status   04/24/2023 5.9 (H) 4.8 - 5.6 % Final     Comment:              Prediabetes: 5.7 - 6.4           Diabetes: >6.4           Glycemic control for adults with diabetes: <7.0       Lab Results   Component Value Date    TSH 4.690 (H) 04/24/2023 02/01/22    TRANSTHORACIC ECHOCARDIOGRAM (TTE) COMPLETE (CONTRAST/BUBBLE/3D PRN) 02/01/2022 10:18 AM, 02/01/2022 12:00 AM (Final)    Narrative  This is a summary report. The complete report is available in the patient's medical record. If you cannot access the medical record, please contact the sending organization for a detailed fax or copy. Left Ventricle: Left ventricle size is normal. Mild septal thickening. Normal wall motion. Normal left ventricular systolic function with a visually estimated EF of 55 - 60%. Diastolic dysfunction present with normal LV EF. Right Ventricle: Mildly reduced systolic function. TAPSE is abnormal.    Mitral Valve: Mildly thickened leaflets. Mild transvalvular regurgitation. Left Atrium: Left atrium is moderately dilated. Left atrial volume index is severely increased (>48 mL/m2). Aorta: Normal sized annulus, sinus of Valsalva and aortic arch. Mildly dilated ascending aorta. STRESS TEST (2018)   COMBINED INTERPRETATION:  1. No evidence for reversible myocardial ischemia or infarction. 2. Gated SPECT left ventricular ejection fraction is 64%. IMPRESSION & PLAN:  Dana Mcelroy is 80 y.o. Angelia Lyme female with multiple medical problem      Hypertension: /84.   Currently on metoprolol

## 2023-11-15 ENCOUNTER — CARE COORDINATION (OUTPATIENT)
Facility: CLINIC | Age: 88
End: 2023-11-15

## 2023-11-15 NOTE — CARE COORDINATION
.Ambulatory Care Coordination Note  11/15/2023    Patient Current Location:  Home: [address] [place] [address]  [address] [address]     ACM contacted the patient by telephone. Verified name and  with patient as identifiers. Provided introduction to self, and explanation of the ACM role. Challenges to be reviewed by the provider   Additional needs identified to be addressed with provider: Yes  1. Patient was seen in cardiology ,Dr. Lauren Miller office . All went well B/P down 131/84               Method of communication with provider: chart routing. ACM: Francois Hinojosa RN    Offered patient enrollment in the Remote Patient Monitoring (RPM) program for in-home monitoring: .    Lab Results       None            Care Coordination Interventions    Referral from Primary Care Provider: No  Suggested Interventions and Community Resources  Diabetes Education: In Process  Fall Risk Prevention:  In Process          Goals Addressed    None         Future Appointments   Date Time Provider [address] [place] [address]   2024 11:00 AM Colette Kerns MD AMA BS AMB   2024 10:00 AM Yolette Hamilton MD CAG BS AMB

## 2023-11-27 DIAGNOSIS — I10 ESSENTIAL (PRIMARY) HYPERTENSION: ICD-10-CM

## 2023-11-27 RX ORDER — AMLODIPINE BESYLATE 2.5 MG/1
2.5 TABLET ORAL DAILY
Qty: 90 TABLET | Refills: 1 | Status: SHIPPED | OUTPATIENT
Start: 2023-11-27

## 2023-12-05 ENCOUNTER — CARE COORDINATION (OUTPATIENT)
Facility: CLINIC | Age: 88
End: 2023-12-05

## 2023-12-05 NOTE — CARE COORDINATION
.Patient has graduated from the Complex Care program on 12/5/23. Patient/family has the ability to self-manage at this time. Care management goals have been completed. No further Ambulatory Care Manager follow up scheduled. Goals Addressed                   This Visit's Progress     COMPLETED: Care Coordination Self Management   On track     CC Self Management Goal  Patient Goal (What steps will patient take to achieve goal?): >> Dependent on Granddaughter , Ms Leisa Delcid  Patient is able to discuss self-management of condition(s):  Pt demonstrates adherence to medications  Pt demonstrates understanding of self-monitoring  Patient is able to identify Red Flags:  Alert to potential adverse drug reactions(s) or side effects and actions to take should they arise  Discuss target symptoms and actions to take should they arise  Identify problems that require immediate PCP or specialist visit  Patient demonstrates understanding of access to PCP/Specialist:  Understands about scheduling routine Follow Up appointments   Understands about sick day appointment options for worsening of symptoms/progression (Same Day, E Visits)  9/8/23  Ongoing       COMPLETED: Self-schedules and keeps appointments    On track     11/8/23  Patient has 1 year cardiology f/u 11/9/23 per granddaughter patients daughter is taking her  12/5/23  Patient attended Cardiology appointment Next PCP 1/24       COMPLETED: Take all medications as ordered   On track     9/8/23  Patient voicing she is taking all the medication that her granddaughter gives her  11/8/23  Granddaughter voicing patient takes medication with no difficulty              Patient has Ambulatory Care Manager's contact information for any further questions, concerns, or needs.   Patients upcoming visits:    Future Appointments   Date Time Provider [address]   1/22/2024 11:00 AM Cee Shearer MD AMVEENA BS AMB   11/14/2024 10:00 AM MD TORIBIO Lau BS AMB

## 2023-12-28 ENCOUNTER — TELEPHONE (OUTPATIENT)
Facility: CLINIC | Age: 88
End: 2023-12-28

## 2023-12-28 NOTE — TELEPHONE ENCOUNTER
Patients daughter called to report that her mother has a bad cough.  OTC is not helping and the patient coughs until she urinates on herself.  Is there something you can prescribe her.  Tessalon perles do not work per the daughter.    Call back to Mallorie at [phone]

## 2023-12-29 ENCOUNTER — TELEPHONE (OUTPATIENT)
Facility: CLINIC | Age: 88
End: 2023-12-29

## 2024-01-22 ENCOUNTER — OFFICE VISIT (OUTPATIENT)
Facility: CLINIC | Age: 89
End: 2024-01-22
Payer: MEDICARE

## 2024-01-22 VITALS
OXYGEN SATURATION: 95 % | RESPIRATION RATE: 16 BRPM | BODY MASS INDEX: 28.13 KG/M2 | HEART RATE: 60 BPM | HEIGHT: 61 IN | TEMPERATURE: 97.6 F | WEIGHT: 149 LBS | SYSTOLIC BLOOD PRESSURE: 128 MMHG | DIASTOLIC BLOOD PRESSURE: 75 MMHG

## 2024-01-22 DIAGNOSIS — I47.9 PAROXYSMAL TACHYCARDIA (HCC): ICD-10-CM

## 2024-01-22 DIAGNOSIS — I10 ESSENTIAL (PRIMARY) HYPERTENSION: ICD-10-CM

## 2024-01-22 DIAGNOSIS — Z23 NEED FOR INFLUENZA VACCINATION: ICD-10-CM

## 2024-01-22 DIAGNOSIS — Z00.00 MEDICARE ANNUAL WELLNESS VISIT, SUBSEQUENT: Primary | ICD-10-CM

## 2024-01-22 DIAGNOSIS — N18.31 CHRONIC KIDNEY DISEASE, STAGE 3A (HCC): ICD-10-CM

## 2024-01-22 DIAGNOSIS — F03.90 DEMENTIA WITHOUT BEHAVIORAL DISTURBANCE (HCC): ICD-10-CM

## 2024-01-22 DIAGNOSIS — E11.21 TYPE 2 DIABETES WITH NEPHROPATHY (HCC): ICD-10-CM

## 2024-01-22 LAB — HBA1C MFR BLD: 5.7 %

## 2024-01-22 PROCEDURE — 83036 HEMOGLOBIN GLYCOSYLATED A1C: CPT | Performed by: INTERNAL MEDICINE

## 2024-01-22 PROCEDURE — 1036F TOBACCO NON-USER: CPT | Performed by: INTERNAL MEDICINE

## 2024-01-22 PROCEDURE — 1123F ACP DISCUSS/DSCN MKR DOCD: CPT | Performed by: INTERNAL MEDICINE

## 2024-01-22 PROCEDURE — G8427 DOCREV CUR MEDS BY ELIG CLIN: HCPCS | Performed by: INTERNAL MEDICINE

## 2024-01-22 PROCEDURE — G0008 ADMIN INFLUENZA VIRUS VAC: HCPCS | Performed by: INTERNAL MEDICINE

## 2024-01-22 PROCEDURE — 90662 IIV NO PRSV INCREASED AG IM: CPT | Performed by: INTERNAL MEDICINE

## 2024-01-22 PROCEDURE — G0439 PPPS, SUBSEQ VISIT: HCPCS | Performed by: INTERNAL MEDICINE

## 2024-01-22 PROCEDURE — G8484 FLU IMMUNIZE NO ADMIN: HCPCS | Performed by: INTERNAL MEDICINE

## 2024-01-22 PROCEDURE — 1090F PRES/ABSN URINE INCON ASSESS: CPT | Performed by: INTERNAL MEDICINE

## 2024-01-22 PROCEDURE — 99214 OFFICE O/P EST MOD 30 MIN: CPT | Performed by: INTERNAL MEDICINE

## 2024-01-22 PROCEDURE — G8417 CALC BMI ABV UP PARAM F/U: HCPCS | Performed by: INTERNAL MEDICINE

## 2024-01-22 ASSESSMENT — PATIENT HEALTH QUESTIONNAIRE - PHQ9
2. FEELING DOWN, DEPRESSED OR HOPELESS: 0
SUM OF ALL RESPONSES TO PHQ QUESTIONS 1-9: 0
SUM OF ALL RESPONSES TO PHQ9 QUESTIONS 1 & 2: 0
1. LITTLE INTEREST OR PLEASURE IN DOING THINGS: 0
SUM OF ALL RESPONSES TO PHQ QUESTIONS 1-9: 0

## 2024-01-22 NOTE — PATIENT INSTRUCTIONS
test results and keep a list of the medicines you take.  How can you care for yourself at home?  Diet    Use less salt when you cook and eat. This helps lower your blood pressure. Taste food before salting. Add only a little salt when you think you need it. With time, your taste buds will adjust to less salt.     Eat fewer snack items, fast foods, canned soups, and other high-salt, high-fat, processed foods.     Read food labels and try to avoid saturated and trans fats. They increase your risk of heart disease by raising cholesterol levels.     Limit the amount of solid fat-butter, margarine, and shortening-you eat. Use olive, peanut, or canola oil when you cook. Bake, broil, and steam foods instead of frying them.     Eat a variety of fruit and vegetables every day. Dark green, deep orange, red, or yellow fruits and vegetables are especially good for you. Examples include spinach, carrots, peaches, and berries.     Foods high in fiber can reduce your cholesterol and provide important vitamins and minerals. High-fiber foods include whole-grain cereals and breads, oatmeal, beans, brown rice, citrus fruits, and apples.     Eat lean proteins. Heart-healthy proteins include seafood, lean meats and poultry, eggs, beans, peas, nuts, seeds, and soy products.     Limit drinks and foods with added sugar. These include candy, desserts, and soda pop.   Lifestyle changes    If your doctor recommends it, get more exercise. Walking is a good choice. Bit by bit, increase the amount you walk every day. Try for at least 30 minutes on most days of the week. You also may want to swim, bike, or do other activities.     Do not smoke. If you need help quitting, talk to your doctor about stop-smoking programs and medicines. These can increase your chances of quitting for good. Quitting smoking may be the most important step you can take to protect your heart. It is never too late to quit.     Limit alcohol to 2 drinks a day for men and 1

## 2024-01-22 NOTE — PROGRESS NOTES
Magaly Cordero presents today for   Chief Complaint   Patient presents with    Follow-up    Medicare AWV       Is someone accompanying this pt? yes    Is the patient using any DME equipment during OV? no    Depression Screening:       No data to display                Learning Assessment:      Abuse Screening:       No data to display                Fall Risk      Health Maintenance reviewed and discussed and ordered per Provider.    Health Maintenance Due   Topic Date Due    Respiratory Syncytial Virus (RSV) Pregnant or age 60 yrs+ (1 - 1-dose 60+ series) Never done    Shingles vaccine (2 of 3) 02/26/2016    Flu vaccine (1) 08/01/2023    COVID-19 Vaccine (4 - 2023-24 season) 09/01/2023    Annual Wellness Visit (Medicare Advantage)  Never done   .      Coordination of Care:  1. Have you been to the ER, urgent care clinic since your last visit? Hospitalized since your last visit? no    2. Have you seen or consulted any other health care providers outside of the [facility] since your last visit? Include any pap smears or colon screening. no      Last  Checked na  Last UDS Checked na  Last Pain contract signed: doris    
exchange   Abdomen:  Non-tender, pos bowel sounds, no hepatosplenomegaly  Ext:  No C/C/E    Skin: no rash  Neuro: no lateralizing signs, CNs II-XII intact             No Known Allergies  Prior to Visit Medications    Medication Sig Taking? Authorizing Provider   amLODIPine (NORVASC) 2.5 MG tablet Take 1 tablet by mouth daily Yes Lorraine Frye MD   metoprolol tartrate (LOPRESSOR) 25 MG tablet Take 0.5 tablets by mouth 2 times daily Yes Lorraine Frye MD   donepezil (ARICEPT) 10 MG tablet Take 1 tablet by mouth nightly Yes Lorraine Frye MD   acetaminophen (TYLENOL) 325 MG tablet Take by mouth every 4 hours as needed Yes Automatic Reconciliation, Ar   albuterol sulfate HFA (PROVENTIL;VENTOLIN;PROAIR) 108 (90 Base) MCG/ACT inhaler Inhale 1 puff into the lungs every 6 hours as needed Yes Automatic Reconciliation, Ar   aspirin 81 MG EC tablet Take 1 tablet by mouth daily Yes Automatic Reconciliation, Ar   cetirizine (ZYRTEC) 10 MG tablet Take 1 tablet by mouth daily as needed Yes Automatic Reconciliation, Ar       CareTeam (Including outside providers/suppliers regularly involved in providing care):   Patient Care Team:  Lorraine Frye MD as PCP - General  Lorraine Frye MD as PCP - Empaneled Provider  Rajesh Lamb MD as Physician     Reviewed and updated this visit:  Tobacco  Allergies  Meds  Problems  Med Hx  Surg Hx  Soc Hx  Fam Hx

## 2024-02-19 DIAGNOSIS — I10 ESSENTIAL (PRIMARY) HYPERTENSION: ICD-10-CM

## 2024-02-20 RX ORDER — DONEPEZIL HYDROCHLORIDE 10 MG/1
10 TABLET, FILM COATED ORAL NIGHTLY
Qty: 90 TABLET | Refills: 1 | Status: SHIPPED | OUTPATIENT
Start: 2024-02-20

## 2024-02-20 RX ORDER — AMLODIPINE BESYLATE 2.5 MG/1
2.5 TABLET ORAL DAILY
Qty: 90 TABLET | Refills: 1 | Status: SHIPPED | OUTPATIENT
Start: 2024-02-20

## 2024-04-14 RX ORDER — ALBUTEROL SULFATE 90 UG/1
1 AEROSOL, METERED RESPIRATORY (INHALATION) EVERY 6 HOURS PRN
Qty: 18 G | Refills: 1 | Status: SHIPPED | OUTPATIENT
Start: 2024-04-14

## 2024-07-03 ENCOUNTER — OFFICE VISIT (OUTPATIENT)
Facility: CLINIC | Age: 89
End: 2024-07-03
Payer: MEDICARE

## 2024-07-03 VITALS
WEIGHT: 141 LBS | OXYGEN SATURATION: 93 % | HEART RATE: 48 BPM | DIASTOLIC BLOOD PRESSURE: 88 MMHG | TEMPERATURE: 98.4 F | SYSTOLIC BLOOD PRESSURE: 134 MMHG | HEIGHT: 61 IN | RESPIRATION RATE: 14 BRPM | BODY MASS INDEX: 26.62 KG/M2

## 2024-07-03 DIAGNOSIS — J30.1 SEASONAL ALLERGIC RHINITIS DUE TO POLLEN: ICD-10-CM

## 2024-07-03 DIAGNOSIS — L29.9 EAR ITCHING: Primary | ICD-10-CM

## 2024-07-03 DIAGNOSIS — H61.22 IMPACTED CERUMEN, LEFT EAR: ICD-10-CM

## 2024-07-03 DIAGNOSIS — H93.8X2 CRACKLING SOUND IN LEFT EAR: ICD-10-CM

## 2024-07-03 PROCEDURE — G8417 CALC BMI ABV UP PARAM F/U: HCPCS | Performed by: NURSE PRACTITIONER

## 2024-07-03 PROCEDURE — 1123F ACP DISCUSS/DSCN MKR DOCD: CPT | Performed by: NURSE PRACTITIONER

## 2024-07-03 PROCEDURE — 1090F PRES/ABSN URINE INCON ASSESS: CPT | Performed by: NURSE PRACTITIONER

## 2024-07-03 PROCEDURE — 1036F TOBACCO NON-USER: CPT | Performed by: NURSE PRACTITIONER

## 2024-07-03 PROCEDURE — 99203 OFFICE O/P NEW LOW 30 MIN: CPT | Performed by: NURSE PRACTITIONER

## 2024-07-03 PROCEDURE — G8428 CUR MEDS NOT DOCUMENT: HCPCS | Performed by: NURSE PRACTITIONER

## 2024-07-03 RX ORDER — GUAIFENESIN 600 MG/1
600 TABLET, EXTENDED RELEASE ORAL 2 TIMES DAILY
Qty: 30 TABLET | Refills: 0 | Status: SHIPPED | OUTPATIENT
Start: 2024-07-03 | End: 2024-07-18

## 2024-07-03 NOTE — PROGRESS NOTES
\"Have you been to the ER, urgent care clinic since your last visit?  Hospitalized since your last visit?\"    Yes-ED [facility] 6-30-24 Dx: Impacted cerumen of left ear  “Have you seen or consulted any other health care providers outside of [facility] since your last visit?”    NO            Click Here for Release of Records Request'

## 2024-07-03 NOTE — PROGRESS NOTES
[address] [place] [id number]               [phone]      Magaly Cordero is a 89 y.o. female and presents with Follow-up (ED [facility] 6-30-24 Dx: Impacted cerumen of left ear)       Assessment/Plan:    1. Ear itching  -     External Referral To ENT  2. Impacted cerumen, left ear  -     carbamide peroxide (DEBROX) 6.5 % otic solution; Place 5 drops into the left ear 2 times daily, Disp-15 mL, R-0Normal  3. Seasonal allergic rhinitis due to pollen  -     guaiFENesin (MUCINEX) 600 MG extended release tablet; Take 1 tablet by mouth 2 times daily for 15 days, Disp-30 tablet, R-0Normal  4. Crackling sound in left ear  -     External Referral To ENT         Follow up and disposition:   No follow-ups on file.      Subjective:    Labs obtained prior to visit? No  Reviewed with patient? N/A    Patient recently seen in the ER for crackling sound in left ear  --no foreign body noted in ear after irrigation     Continued crackling in left ear    ROS:     Review of Systems   Constitutional:  Negative for chills, fatigue and fever.   HENT:  Positive for congestion, postnasal drip, rhinorrhea and sneezing. Negative for ear pain, hearing loss, sore throat and tinnitus.         Crackling sensation left ear   Respiratory:  Negative for cough, chest tightness, shortness of breath and wheezing.    Cardiovascular:  Negative for chest pain and leg swelling.   Genitourinary: Negative.    Musculoskeletal: Negative.    Neurological: Negative.    Psychiatric/Behavioral: Negative.           The problem list was updated as a part of today's visit.  Patient Active Problem List   Diagnosis    Rectal hemorrhage    Essential (primary) hypertension    CKD (chronic kidney disease) stage 3, GFR 30-59 ml/min ([facility])    Overweight (BMI 25.0-29.9)    Dementia without behavioral disturbance ([facility])    Primary osteoarthritis of both knees    Mass of parotid gland    Osteoporosis without current pathological fracture

## 2024-07-15 ASSESSMENT — ENCOUNTER SYMPTOMS
CHEST TIGHTNESS: 0
RHINORRHEA: 1
SORE THROAT: 0
SHORTNESS OF BREATH: 0
COUGH: 0
WHEEZING: 0

## 2024-07-22 ENCOUNTER — OFFICE VISIT (OUTPATIENT)
Facility: CLINIC | Age: 89
End: 2024-07-22
Payer: MEDICARE

## 2024-07-22 VITALS
SYSTOLIC BLOOD PRESSURE: 124 MMHG | RESPIRATION RATE: 16 BRPM | TEMPERATURE: 98.3 F | WEIGHT: 143.2 LBS | OXYGEN SATURATION: 97 % | BODY MASS INDEX: 27.03 KG/M2 | DIASTOLIC BLOOD PRESSURE: 75 MMHG | HEART RATE: 51 BPM | HEIGHT: 61 IN

## 2024-07-22 DIAGNOSIS — E78.5 HYPERLIPIDEMIA, UNSPECIFIED HYPERLIPIDEMIA TYPE: ICD-10-CM

## 2024-07-22 DIAGNOSIS — R06.02 SHORTNESS OF BREATH: ICD-10-CM

## 2024-07-22 DIAGNOSIS — I10 ESSENTIAL (PRIMARY) HYPERTENSION: ICD-10-CM

## 2024-07-22 DIAGNOSIS — E11.21 TYPE 2 DIABETES WITH NEPHROPATHY (HCC): Primary | ICD-10-CM

## 2024-07-22 DIAGNOSIS — E03.9 ACQUIRED HYPOTHYROIDISM: ICD-10-CM

## 2024-07-22 LAB — HBA1C MFR BLD: 5.6 %

## 2024-07-22 PROCEDURE — G8427 DOCREV CUR MEDS BY ELIG CLIN: HCPCS | Performed by: INTERNAL MEDICINE

## 2024-07-22 PROCEDURE — 83036 HEMOGLOBIN GLYCOSYLATED A1C: CPT | Performed by: INTERNAL MEDICINE

## 2024-07-22 PROCEDURE — 1123F ACP DISCUSS/DSCN MKR DOCD: CPT | Performed by: INTERNAL MEDICINE

## 2024-07-22 PROCEDURE — 99214 OFFICE O/P EST MOD 30 MIN: CPT | Performed by: INTERNAL MEDICINE

## 2024-07-22 PROCEDURE — 1090F PRES/ABSN URINE INCON ASSESS: CPT | Performed by: INTERNAL MEDICINE

## 2024-07-22 PROCEDURE — 1036F TOBACCO NON-USER: CPT | Performed by: INTERNAL MEDICINE

## 2024-07-22 PROCEDURE — G8417 CALC BMI ABV UP PARAM F/U: HCPCS | Performed by: INTERNAL MEDICINE

## 2024-07-22 RX ORDER — AMLODIPINE BESYLATE 2.5 MG/1
2.5 TABLET ORAL DAILY
Qty: 90 TABLET | Refills: 1 | Status: SHIPPED | OUTPATIENT
Start: 2024-07-22

## 2024-07-22 RX ORDER — ALBUTEROL SULFATE AND BUDESONIDE 90; 80 UG/1; UG/1
AEROSOL, METERED RESPIRATORY (INHALATION)
Qty: 32.1 G | Refills: 1 | Status: SHIPPED | OUTPATIENT
Start: 2024-07-22

## 2024-07-22 RX ORDER — ALBUTEROL SULFATE 90 UG/1
1 AEROSOL, METERED RESPIRATORY (INHALATION) EVERY 6 HOURS PRN
Qty: 18 G | Refills: 1 | Status: CANCELLED | OUTPATIENT
Start: 2024-07-22

## 2024-07-22 NOTE — PROGRESS NOTES
\"Have you been to the ER, urgent care clinic since your last visit?  Hospitalized since your last visit?\"    Cheo Khan 7/1/24 for left ear pain     “Have you seen or consulted any other health care providers outside of [facility] since your last visit?”    NO            Click Here for Release of Records Request

## 2024-07-22 NOTE — PROGRESS NOTES
3  Assessment/ Plan:   Magaly was seen today for follow-up chronic condition, shortness of breath, dizziness and other.    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 diet alone  -     AMB POC HEMOGLOBIN A1C  -     Comprehensive Metabolic Panel; Future    Essential (primary) hypertension-controlled, continue with present meds  -     CBC; Future    Hyperlipidemia, unspecified hyperlipidemia type-currently off meds bec of age  -     Lipid Panel; Future    Shortness of breath-will change inhaler to Air supra  -     Albuterol-Budesonide (AIRSUPRA) 90-80 MCG/ACT AERO; 2 puffs Q 2 hrs as needed for shortness of breath max of 12 puffs/day    Acquired hypothyroidism-currently not on meds, no sxs  -     TSH; Future        Follow-up and Dispositions    Return in about 6 months (around 1/22/2025) for follow up.                     Chief Complaint   Patient presents with    Follow-up Chronic Condition     6 month follow up    Shortness of Breath    Dizziness    Other     Left ear       Pt is a 89 y.o. year old female who presents for follow up of her chronic medical problems    Health Maintenance Due   Topic Date Due    Respiratory Syncytial Virus (RSV) Pregnant or age 60 yrs+ (1 - 1-dose 60+ series) Never done    Shingles vaccine (2 of 3) 02/26/2016    COVID-19 Vaccine (4 - 2023-24 season) 09/01/2023      Wt Readings from Last 3 Encounters:   07/22/24 65 kg (143 lb 3.2 oz)   07/03/24 64 kg (141 lb)   01/22/24 67.6 kg (149 lb)        BP Readings from Last 3 Encounters:   07/22/24 124/75   07/03/24 134/88   01/22/24 128/75        Hemoglobin A1C   Date Value Ref Range Status   04/24/2023 5.9 (H) 4.8 - 5.6 % Final     Comment:              Prediabetes: 5.7 - 6.4           Diabetes: >6.4           Glycemic control for adults with diabetes: <7.0       Hemoglobin A1C, POC   Date Value Ref Range Status   07/22/2024 5.6 % Final        Felt short of breath-inhaler helps and has run out

## 2024-07-23 LAB
ALBUMIN SERPL-MCNC: 3.9 G/DL (ref 3.6–4.6)
ALP SERPL-CCNC: 82 IU/L (ref 44–121)
ALT SERPL-CCNC: 8 IU/L (ref 0–32)
AST SERPL-CCNC: 20 IU/L (ref 0–40)
BASOPHILS # BLD AUTO: 0.1 X10E3/UL (ref 0–0.2)
BASOPHILS NFR BLD AUTO: 1 %
BILIRUB SERPL-MCNC: 0.4 MG/DL (ref 0–1.2)
BUN SERPL-MCNC: 12 MG/DL (ref 10–36)
BUN/CREAT SERPL: 10 (ref 12–28)
CALCIUM SERPL-MCNC: 9 MG/DL (ref 8.7–10.3)
CHLORIDE SERPL-SCNC: 108 MMOL/L (ref 96–106)
CHOLEST SERPL-MCNC: 234 MG/DL (ref 100–199)
CO2 SERPL-SCNC: 19 MMOL/L (ref 20–29)
CREAT SERPL-MCNC: 1.15 MG/DL (ref 0.57–1)
EGFRCR SERPLBLD CKD-EPI 2021: 44 ML/MIN/1.73
EOSINOPHIL # BLD AUTO: 0.1 X10E3/UL (ref 0–0.4)
EOSINOPHIL NFR BLD AUTO: 1 %
ERYTHROCYTE [DISTWIDTH] IN BLOOD BY AUTOMATED COUNT: 14.5 % (ref 11.7–15.4)
GLOBULIN SER CALC-MCNC: 2.3 G/DL (ref 1.5–4.5)
GLUCOSE SERPL-MCNC: 73 MG/DL (ref 70–99)
HCT VFR BLD AUTO: 38.3 % (ref 34–46.6)
HDLC SERPL-MCNC: 45 MG/DL
HGB BLD-MCNC: 12.6 G/DL (ref 11.1–15.9)
IMM GRANULOCYTES # BLD AUTO: 0 X10E3/UL (ref 0–0.1)
IMM GRANULOCYTES NFR BLD AUTO: 0 %
IMP & REVIEW OF LAB RESULTS: NORMAL
LDLC SERPL CALC-MCNC: 162 MG/DL (ref 0–99)
LYMPHOCYTES # BLD AUTO: 2 X10E3/UL (ref 0.7–3.1)
LYMPHOCYTES NFR BLD AUTO: 47 %
Lab: NORMAL
MCH RBC QN AUTO: 26.4 PG (ref 26.6–33)
MCHC RBC AUTO-ENTMCNC: 32.9 G/DL (ref 31.5–35.7)
MCV RBC AUTO: 80 FL (ref 79–97)
MONOCYTES # BLD AUTO: 0.4 X10E3/UL (ref 0.1–0.9)
MONOCYTES NFR BLD AUTO: 8 %
NEUTROPHILS # BLD AUTO: 1.8 X10E3/UL (ref 1.4–7)
NEUTROPHILS NFR BLD AUTO: 43 %
PLATELET # BLD AUTO: 259 X10E3/UL (ref 150–450)
POTASSIUM SERPL-SCNC: 4.1 MMOL/L (ref 3.5–5.2)
PROT SERPL-MCNC: 6.2 G/DL (ref 6–8.5)
RBC # BLD AUTO: 4.78 X10E6/UL (ref 3.77–5.28)
REPORT: NORMAL
REPORT: NORMAL
SODIUM SERPL-SCNC: 145 MMOL/L (ref 134–144)
SPECIMEN STATUS REPORT: NORMAL
TRIGL SERPL-MCNC: 148 MG/DL (ref 0–149)
TSH SERPL DL<=0.005 MIU/L-ACNC: 4.02 UIU/ML (ref 0.45–4.5)
VLDLC SERPL CALC-MCNC: 27 MG/DL (ref 5–40)
WBC # BLD AUTO: 4.3 X10E3/UL (ref 3.4–10.8)

## 2024-08-28 RX ORDER — DONEPEZIL HYDROCHLORIDE 10 MG/1
10 TABLET, FILM COATED ORAL NIGHTLY
Qty: 90 TABLET | Refills: 1 | Status: SHIPPED | OUTPATIENT
Start: 2024-08-28

## 2024-08-28 RX ORDER — ALBUTEROL SULFATE 90 UG/1
1 AEROSOL, METERED RESPIRATORY (INHALATION) EVERY 6 HOURS PRN
Qty: 18 G | Refills: 1 | Status: SHIPPED | OUTPATIENT
Start: 2024-08-28

## 2024-09-18 RX ORDER — METOPROLOL TARTRATE 25 MG/1
12.5 TABLET, FILM COATED ORAL 2 TIMES DAILY
Qty: 90 TABLET | Refills: 1 | Status: SHIPPED | OUTPATIENT
Start: 2024-09-18

## 2024-10-01 SDOH — ECONOMIC STABILITY: FOOD INSECURITY: WITHIN THE PAST 12 MONTHS, THE FOOD YOU BOUGHT JUST DIDN'T LAST AND YOU DIDN'T HAVE MONEY TO GET MORE.: NEVER TRUE

## 2024-10-01 SDOH — ECONOMIC STABILITY: INCOME INSECURITY: HOW HARD IS IT FOR YOU TO PAY FOR THE VERY BASICS LIKE FOOD, HOUSING, MEDICAL CARE, AND HEATING?: NOT VERY HARD

## 2024-10-01 SDOH — ECONOMIC STABILITY: FOOD INSECURITY: WITHIN THE PAST 12 MONTHS, YOU WORRIED THAT YOUR FOOD WOULD RUN OUT BEFORE YOU GOT MONEY TO BUY MORE.: NEVER TRUE

## 2024-10-02 ENCOUNTER — OFFICE VISIT (OUTPATIENT)
Facility: CLINIC | Age: 89
End: 2024-10-02
Payer: MEDICARE

## 2024-10-02 VITALS
HEART RATE: 47 BPM | RESPIRATION RATE: 16 BRPM | BODY MASS INDEX: 26.51 KG/M2 | DIASTOLIC BLOOD PRESSURE: 79 MMHG | SYSTOLIC BLOOD PRESSURE: 121 MMHG | TEMPERATURE: 98.2 F | WEIGHT: 140.4 LBS | OXYGEN SATURATION: 94 % | HEIGHT: 61 IN

## 2024-10-02 DIAGNOSIS — I10 ESSENTIAL (PRIMARY) HYPERTENSION: Primary | ICD-10-CM

## 2024-10-02 DIAGNOSIS — R21 RASH: ICD-10-CM

## 2024-10-02 DIAGNOSIS — Z23 ENCOUNTER FOR ADMINISTRATION OF VACCINE: ICD-10-CM

## 2024-10-02 DIAGNOSIS — R00.1 BRADYCARDIA: ICD-10-CM

## 2024-10-02 PROCEDURE — G0008 ADMIN INFLUENZA VIRUS VAC: HCPCS | Performed by: INTERNAL MEDICINE

## 2024-10-02 PROCEDURE — 1090F PRES/ABSN URINE INCON ASSESS: CPT | Performed by: INTERNAL MEDICINE

## 2024-10-02 PROCEDURE — G8427 DOCREV CUR MEDS BY ELIG CLIN: HCPCS | Performed by: INTERNAL MEDICINE

## 2024-10-02 PROCEDURE — 1036F TOBACCO NON-USER: CPT | Performed by: INTERNAL MEDICINE

## 2024-10-02 PROCEDURE — 90653 IIV ADJUVANT VACCINE IM: CPT | Performed by: INTERNAL MEDICINE

## 2024-10-02 PROCEDURE — G8482 FLU IMMUNIZE ORDER/ADMIN: HCPCS | Performed by: INTERNAL MEDICINE

## 2024-10-02 PROCEDURE — G8417 CALC BMI ABV UP PARAM F/U: HCPCS | Performed by: INTERNAL MEDICINE

## 2024-10-02 PROCEDURE — 99214 OFFICE O/P EST MOD 30 MIN: CPT | Performed by: INTERNAL MEDICINE

## 2024-10-02 PROCEDURE — 1123F ACP DISCUSS/DSCN MKR DOCD: CPT | Performed by: INTERNAL MEDICINE

## 2024-10-02 RX ORDER — CLOBETASOL PROPIONATE 0.5 MG/G
OINTMENT TOPICAL
Qty: 30 G | Refills: 0 | Status: SHIPPED | OUTPATIENT
Start: 2024-10-02

## 2024-10-02 ASSESSMENT — PATIENT HEALTH QUESTIONNAIRE - PHQ9
SUM OF ALL RESPONSES TO PHQ9 QUESTIONS 1 & 2: 2
SUM OF ALL RESPONSES TO PHQ QUESTIONS 1-9: 2
SUM OF ALL RESPONSES TO PHQ QUESTIONS 1-9: 2
2. FEELING DOWN, DEPRESSED OR HOPELESS: MORE THAN HALF THE DAYS
SUM OF ALL RESPONSES TO PHQ QUESTIONS 1-9: 2
1. LITTLE INTEREST OR PLEASURE IN DOING THINGS: NOT AT ALL
SUM OF ALL RESPONSES TO PHQ QUESTIONS 1-9: 2

## 2024-10-02 NOTE — PROGRESS NOTES
\"Have you been to the ER, urgent care clinic since your last visit?  Hospitalized since your last visit?\"    NO    “Have you seen or consulted any other health care providers outside our system since your last visit?”    Yes- ENT on 9/12/24             
07/22/2024    Off statin bec of age    Memory not getting worse-will continue with Aricept    ROS:    Pt denies: Wt loss, Fever/Chills, HA, Visual changes, Fatigue, Chest pain, SOB, JOLLY, Abd pain, N/V/D/C, Blood in stool or urine, Edema. Pertinent positive as above in HPI. All others were negative    Patient Active Problem List   Diagnosis    Rectal hemorrhage    Essential (primary) hypertension    CKD (chronic kidney disease) stage 3, GFR 30-59 ml/min ([facility])    Overweight (BMI 25.0-29.9)    Dementia without behavioral disturbance (HCC)    Primary osteoarthritis of both knees    Mass of parotid gland    Osteoporosis without current pathological fracture    Hyperlipidemia    Type 2 diabetes with nephropathy (HCC)    Acquired hypothyroidism    PSVT (paroxysmal supraventricular tachycardia) ([facility])    Ataxia    Paroxysmal tachycardia ([facility])    Diabetes ([facility])    Impacted cerumen, left ear    Ear itching       Past Medical History:   Diagnosis Date    Acquired hypothyroidism 5/12/2022    Diabetes ([facility])     Essential hypertension 5/25/2017    Hyperlipidemia 5/25/2017    Paroxysmal tachycardia ([facility]) 9/18/2018    Primary osteoarthritis of both knees        Current Outpatient Medications   Medication Sig Dispense Refill    metoprolol tartrate (LOPRESSOR) 25 MG tablet Take 0.5 tablets by mouth 2 times daily 90 tablet 1    albuterol sulfate HFA (PROVENTIL;VENTOLIN;PROAIR) 108 (90 Base) MCG/ACT inhaler Inhale 1 puff into the lungs every 6 hours as needed for Wheezing or Shortness of Breath 18 g 1    donepezil (ARICEPT) 10 MG tablet Take 1 tablet by mouth nightly 90 tablet 1    amLODIPine (NORVASC) 2.5 MG tablet Take 1 tablet by mouth daily 90 tablet 1    acetaminophen (TYLENOL) 325 MG tablet Take by mouth every 4 hours as needed      aspirin 81 MG EC tablet Take 1 tablet by mouth daily      cetirizine (ZYRTEC) 10 MG tablet Take 1 tablet by mouth daily as needed       No current facility-administered medications for this visit.

## 2024-10-24 NOTE — PATIENT INSTRUCTIONS
Problem: Chronic Conditions and Co-morbidities  Goal: Patient's chronic conditions and co-morbidity symptoms are monitored and maintained or improved  Outcome: Progressing     Problem: Safety - Adult  Goal: Free from fall injury  Outcome: Progressing      Medicare Wellness Visit, Female The best way to live healthy is to have a lifestyle where you eat a well-balanced diet, exercise regularly, limit alcohol use, and quit all forms of tobacco/nicotine, if applicable. Regular preventive services are another way to keep healthy. Preventive services (vaccines, screening tests, monitoring & exams) can help personalize your care plan, which helps you manage your own care. Screening tests can find health problems at the earliest stages, when they are easiest to treat. [facility] follows the current, evidence-based guidelines published by the [facility] Kylie ([facility]) when recommending preventive services for our patients. Because we follow these guidelines, sometimes recommendations change over time as research supports it. (For example, mammograms used to be recommended annually. Even though Medicare will still pay for an annual mammogram, the newer guidelines recommend a mammogram every two years for women of average risk.) Of course, you and your doctor may decide to screen more often for some diseases, based on your risk and your health status. Preventive services for you include: - Medicare offers their members a free annual wellness visit, which is time for you and your primary care provider to discuss and plan for your preventive service needs. Take advantage of this benefit every year! 
-All adults over the age of 72 should receive the recommended pneumonia vaccines. Current USPSTF guidelines recommend a series of two vaccines for the best pneumonia protection.  
-All adults should have a flu vaccine yearly and a tetanus vaccine every 10 years. All adults age 61 and older should receive a shingles vaccine once in their lifetime.   
-A bone mass density test is recommended when a woman turns 65 to screen for osteoporosis. This test is only recommended one time, as a screening. Some providers will use this same test as a disease monitoring tool if you already have osteoporosis. -All adults age 38-68 who are overweight should have a diabetes screening test once every three years.  
-Other screening tests and preventive services for persons with diabetes include: an eye exam to screen for diabetic retinopathy, a kidney function test, a foot exam, and stricter control over your cholesterol.  
-Cardiovascular screening for adults with routine risk involves an electrocardiogram (ECG) at intervals determined by your doctor.  
-Colorectal cancer screenings should be done for adults age 54-65 with no increased risk factors for colorectal cancer. There are a number of acceptable methods of screening for this type of cancer. Each test has its own benefits and drawbacks. Discuss with your doctor what is most appropriate for you during your annual wellness visit. The different tests include: colonoscopy (considered the best screening method), a fecal occult blood test, a fecal DNA test, and sigmoidoscopy. -Breast cancer screenings are recommended every other year for women of normal risk, age 54-69. 
-Cervical cancer screenings for women over age 72 are only recommended with certain risk factors.  
-All adults born between [facility] should be screened once for Hepatitis C. Here is a list of your current Health Maintenance items (your personalized list of preventive services) with a due date: 
Health Maintenance Due Topic Date Due  Shingles Vaccine (1 of 2) 12/30/1979  
 DTaP/Tdap/Td  (1 - Tdap) 01/12/2013

## 2024-11-14 ENCOUNTER — OFFICE VISIT (OUTPATIENT)
Age: 89
End: 2024-11-14

## 2024-11-14 VITALS
OXYGEN SATURATION: 97 % | BODY MASS INDEX: 26.24 KG/M2 | HEIGHT: 61 IN | HEART RATE: 54 BPM | SYSTOLIC BLOOD PRESSURE: 151 MMHG | DIASTOLIC BLOOD PRESSURE: 89 MMHG | WEIGHT: 139 LBS

## 2024-11-14 DIAGNOSIS — R06.02 SHORTNESS OF BREATH: Primary | ICD-10-CM

## 2024-11-14 DIAGNOSIS — R42 DIZZINESS: ICD-10-CM

## 2024-11-14 NOTE — PROGRESS NOTES
Identified pt with two pt identifiers(name and ). Reviewed record in preparation for visit and have obtained necessary documentation.    Magaly Cordero presents today for   Chief Complaint   Patient presents with    Follow-up      1 year         Pt c/o DIZZINESS,            Magaly Cordero preferred language for health care discussion is english/other.    Personal Protective Equipment:   Personal Protective Equipment was used including: mask-surgical and hands-gloves. Patient was placed on no precaution(s). Patient was masked.    Precautions:   Patient currently on None  Patient currently roomed with door closed.    Is someone accompanying this pt? Yes    Is the patient using any DME equipment during OV? no    Depression Screening:      10/2/2024    11:08 AM 2024    11:31 AM 2024    11:21 AM 2023    10:16 AM 2023    12:38 PM 2023    12:53 PM 2022     3:44 PM   PHQ-9 Questionaire   Little interest or pleasure in doing things 0 0 0 0 0 0 0   Feeling down, depressed, or hopeless 2 1 0 0 1 0 0   PHQ-9 Total Score 2 1 0 0 1 0 0        Learning Assessment:  No question data found.    Abuse Screenin/14/2024    10:00 AM 2023     5:00 PM   AMB Abuse Screening   Do you ever feel afraid of your partner? N N   Are you in a relationship with someone who physically or mentally threatens you? N N   Is it safe for you to go home? Y Y          Fall Risk      2024    11:23 AM 2023     5:29 PM 2023    10:10 AM   Fall Risk   Do you feel unsteady or are you worried about falling?  no no no   2 or more falls in past year? no no no   Fall with injury in past year? no no no         Pt currently taking Anticoagulant /Antiplatelet therapy? aspirin    Coordination of Care:  1. Have you been to the ER, urgent care clinic since your last visit? Hospitalized since your last visit? yes    2. Have you seen or consulted any other health care providers outside of the [facility] 
      Allergies and Sensitivities:  No Known Allergies    Family History:  Family History   Problem Relation Age of Onset    Hypertension Sister     Kidney Disease Brother     Kidney Disease Mother        Social History:  Social History     Tobacco Use    Smoking status: Never    Smokeless tobacco: Never   Vaping Use    Vaping status: Unknown   Substance Use Topics    Alcohol use: No    Drug use: No     She  reports that she has never smoked. She has never used smokeless tobacco.  She  reports no history of alcohol use.    Physical Exam:  BP Readings from Last 3 Encounters:   11/14/24 (!) 151/89   10/02/24 121/79   07/22/24 124/75         Pulse Readings from Last 3 Encounters:   11/14/24 54   10/02/24 (!) 47   07/22/24 51          Wt Readings from Last 3 Encounters:   11/14/24 63 kg (139 lb)   10/02/24 63.7 kg (140 lb 6.4 oz)   07/22/24 65 kg (143 lb 3.2 oz)       Constitutional: Oriented to person, place, and time.   HENT: Head: Normocephalic and atraumatic.   Neck: No JVD present.   Cardiovascular: Regular rhythm.   No murmur, gallop or rubs appreciated  Lung: Breath sounds normal. No respiratory distress. No ronchi or rales appreciated  Abdominal: No tenderness. No rebound and no guarding.   Musculoskeletal: There is no lower extremity edema. No cynosis    LABS:   @  Lab Results   Component Value Date/Time    WBC 4.3 07/22/2024 12:00 AM    HGB 12.6 07/22/2024 12:00 AM    HCT 38.3 07/22/2024 12:00 AM     07/22/2024 12:00 AM    MCV 80 07/22/2024 12:00 AM     Lab Results   Component Value Date/Time     07/22/2024 12:00 AM    K 4.1 07/22/2024 12:00 AM     07/22/2024 12:00 AM    CO2 19 07/22/2024 12:00 AM    BUN 12 07/22/2024 12:00 AM    CREATININE 1.15 07/22/2024 12:00 AM    GLUCOSE 73 07/22/2024 12:00 AM    CALCIUM 9.0 07/22/2024 12:00 AM           Latest Ref Rng & Units 7/22/2024    12:00 AM 4/24/2023    12:00 AM 12/8/2021     9:12 AM 9/7/2021    12:00 AM 1/8/2021    12:00 AM   Lipids   Chol 100

## 2024-11-14 NOTE — PATIENT INSTRUCTIONS
[facility]  PATIENT PREPS:   -Wear easy to remove shirt to your appointment for easier accessibility.      **call office 3-5 days after testing is completed for results** Please ensure testing is completed prior to scheduled follow up appointment. If it is not completed your appointment may be rescheduled**    Other Testing  [employer]-(30 days)may be calling you to confirm your address to send your Heart Monitor.   Please allow 7-10 business days for monitor to arrive at your home.  Customer Service for [employer]:  [phone]

## 2024-11-20 RX ORDER — DONEPEZIL HYDROCHLORIDE 10 MG/1
10 TABLET, FILM COATED ORAL NIGHTLY
Qty: 90 TABLET | Refills: 1 | Status: SHIPPED | OUTPATIENT
Start: 2024-11-20

## 2024-12-12 ENCOUNTER — CARE COORDINATION (OUTPATIENT)
Facility: CLINIC | Age: 88
End: 2024-12-12

## 2024-12-12 NOTE — CARE COORDINATION
manage Medications: Dependent  Ability to prepare Food Preparation: Dependent  Ability to maintain home (clean home, laundry): Dependent  Ability to drive and/or has transportation: Dependent  Ability to do shopping: Dependent  Ability to manage finances: Dependent  Is patient able to live independently?: No     Current Housing: Private Residence  Who do you live with?: Grandchild  Are you an active caregiver in your home?: No     Patient DME: Walker, Wheelchair              Suggested Interventions and Community Resources               ,   General Assessment              Medications Reviewed:   Not completed during this call: to complete at follow up outreach    Advance Care Planning:   Not reviewed during this call     Care Planning:   Not completed during this call    PCP/Specialist follow up:   Future Appointments         Provider Specialty Dept Phone    12/26/2024 9:00 AM [facility] Cardiology [phone]    2/10/2025 11:00 AM Lorraine Frye MD Family Medicine [phone]    8/21/2025 10:30 AM Samia Lizama PA-C Cardiology [phone]            Follow Up:   Plan for next AC outreach in approximately 1 week to complete:  - CC Protocol assessments  - disease specific assessments  - SDOH assessments  - medication review   - advance care planning   - goal progression  - RPM.   Patient granddaughter is agreeable to this plan.

## 2024-12-24 ENCOUNTER — CARE COORDINATION (OUTPATIENT)
Facility: CLINIC | Age: 88
End: 2024-12-24

## 2024-12-24 NOTE — CARE COORDINATION
Ambulatory Care Coordination Note     2024 11:58 AM     Patient Current Location:  Home: [address] [address]     ACM contacted the patient by telephone. Verified name and  with patient as identifiers.         ACM: Kimberly Reyes, RN     Challenges to be reviewed by the provider   Additional needs identified to be addressed with provider No  none               Method of communication with provider: none.    Utilization: Patient has not had any utilization since our last call.     Care Summary Note: Patient states she is doing well today.  Patient states she will be spending the holidays with her family tomorrow.  Patient states she has all medications prescribed and is not in need of refill. Patient declines any needs during this call.  Patient with no further concerns or questions during this call.     Offered patient enrollment in the Remote Patient Monitoring (RPM) program for in-home monitoring: Deferred at this time because brief call; will discuss at next outreach.     Assessments Completed:   Ambulatory Care Coordination Assessment    Care Coordination Protocol  Referral from Primary Care Provider: No  Week 1 - Initial Assessment     Do you have all of your prescriptions and are they filled?: Yes  Barriers to medication adherence: None  Are you able to afford your medications?: Yes  How often do you have trouble taking your medications the way you have been told to take them?: I always take them as prescribed.        Ability to seek help/take action for Emergent Urgent situations i.e. fire, crime, inclement weather or health crisis.: Independent  Ability to ambulate to restroom: Dependent  Ability handle personal hygeine needs (bathing/dressing/grooming): Dependent  Ability to manage Medications: Dependent  Ability to prepare Food Preparation: Dependent  Ability to maintain home (clean home, laundry): Dependent  Ability to drive and/or has transportation: Dependent  Ability to do

## 2024-12-31 ENCOUNTER — CARE COORDINATION (OUTPATIENT)
Facility: CLINIC | Age: 88
End: 2024-12-31

## 2024-12-31 NOTE — CARE COORDINATION
Ambulatory Care Coordination Note     2024 12:06 PM     Patient Current Location:  Home: [address] [facility] [address]     ACM contacted the patient by telephone. Verified name and  with patient as identifiers.         ACM: Kimberly Reyes, RN     Challenges to be reviewed by the provider   Additional needs identified to be addressed with provider No  none               Method of communication with provider: none.    Utilization: Patient has not had any utilization since our last call.     Care Summary Note: Patient states she is doing well today.  Patient states she celebrated her 89th birthday yesterday with family and patient went out to eat at a restaurant.  Patient with no further needs, concerns or questions during this call.     Offered patient enrollment in the Remote Patient Monitoring (RPM) program for in-home monitoring: Deferred at this time because brief call; will discuss at next outreach.     Assessments Completed:   Ambulatory Care Coordination Assessment    Care Coordination Protocol  Referral from Primary Care Provider: No  Week 1 - Initial Assessment     Do you have all of your prescriptions and are they filled?: Yes  Barriers to medication adherence: None  Are you able to afford your medications?: Yes  How often do you have trouble taking your medications the way you have been told to take them?: I always take them as prescribed.        Ability to seek help/take action for Emergent Urgent situations i.e. fire, crime, inclement weather or health crisis.: Independent  Ability to ambulate to restroom: Dependent  Ability handle personal hygeine needs (bathing/dressing/grooming): Dependent  Ability to manage Medications: Dependent  Ability to prepare Food Preparation: Dependent  Ability to maintain home (clean home, laundry): Dependent  Ability to drive and/or has transportation: Dependent  Ability to do shopping: Dependent  Ability to manage finances: Dependent  Is patient able to

## 2025-01-10 ENCOUNTER — CARE COORDINATION (OUTPATIENT)
Facility: CLINIC | Age: 89
End: 2025-01-10

## 2025-01-10 SDOH — SOCIAL STABILITY: SOCIAL INSECURITY
WITHIN THE LAST YEAR, HAVE TO BEEN RAPED OR FORCED TO HAVE ANY KIND OF SEXUAL ACTIVITY BY YOUR PARTNER OR EX-PARTNER?: PATIENT DECLINED

## 2025-01-10 SDOH — SOCIAL STABILITY: SOCIAL NETWORK
IN A TYPICAL WEEK, HOW MANY TIMES DO YOU TALK ON THE PHONE WITH FAMILY, FRIENDS, OR NEIGHBORS?: MORE THAN THREE TIMES A WEEK

## 2025-01-10 SDOH — ECONOMIC STABILITY: INCOME INSECURITY: IN THE LAST 12 MONTHS, WAS THERE A TIME WHEN YOU WERE NOT ABLE TO PAY THE MORTGAGE OR RENT ON TIME?: NO

## 2025-01-10 SDOH — SOCIAL STABILITY: SOCIAL NETWORK
DO YOU BELONG TO ANY CLUBS OR ORGANIZATIONS SUCH AS CHURCH GROUPS UNIONS, FRATERNAL OR ATHLETIC GROUPS, OR SCHOOL GROUPS?: YES

## 2025-01-10 SDOH — SOCIAL STABILITY: SOCIAL NETWORK: HOW OFTEN DO YOU ATTEND CHURCH OR RELIGIOUS SERVICES?: MORE THAN 4 TIMES PER YEAR

## 2025-01-10 SDOH — HEALTH STABILITY: PHYSICAL HEALTH: ON AVERAGE, HOW MANY MINUTES DO YOU ENGAGE IN EXERCISE AT THIS LEVEL?: 0 MIN

## 2025-01-10 SDOH — SOCIAL STABILITY: SOCIAL INSECURITY: WITHIN THE LAST YEAR, HAVE YOU BEEN AFRAID OF YOUR PARTNER OR EX-PARTNER?: PATIENT DECLINED

## 2025-01-10 SDOH — SOCIAL STABILITY: SOCIAL INSECURITY
WITHIN THE LAST YEAR, HAVE YOU BEEN KICKED, HIT, SLAPPED, OR OTHERWISE PHYSICALLY HURT BY YOUR PARTNER OR EX-PARTNER?: PATIENT DECLINED

## 2025-01-10 SDOH — SOCIAL STABILITY: SOCIAL NETWORK: ARE YOU MARRIED, WIDOWED, DIVORCED, SEPARATED, NEVER MARRIED, OR LIVING WITH A PARTNER?: WIDOWED

## 2025-01-10 SDOH — HEALTH STABILITY: MENTAL HEALTH: HOW OFTEN DO YOU HAVE A DRINK CONTAINING ALCOHOL?: NEVER

## 2025-01-10 SDOH — ECONOMIC STABILITY: FOOD INSECURITY: WITHIN THE PAST 12 MONTHS, YOU WORRIED THAT YOUR FOOD WOULD RUN OUT BEFORE YOU GOT MONEY TO BUY MORE.: NEVER TRUE

## 2025-01-10 SDOH — ECONOMIC STABILITY: FOOD INSECURITY: WITHIN THE PAST 12 MONTHS, THE FOOD YOU BOUGHT JUST DIDN'T LAST AND YOU DIDN'T HAVE MONEY TO GET MORE.: NEVER TRUE

## 2025-01-10 SDOH — HEALTH STABILITY: MENTAL HEALTH
STRESS IS WHEN SOMEONE FEELS TENSE, NERVOUS, ANXIOUS, OR CAN'T SLEEP AT NIGHT BECAUSE THEIR MIND IS TROUBLED. HOW STRESSED ARE YOU?: NOT AT ALL

## 2025-01-10 SDOH — SOCIAL STABILITY: SOCIAL NETWORK: HOW OFTEN DO YOU GET TOGETHER WITH FRIENDS OR RELATIVES?: MORE THAN THREE TIMES A WEEK

## 2025-01-10 SDOH — SOCIAL STABILITY: SOCIAL INSECURITY
WITHIN THE LAST YEAR, HAVE YOU BEEN HUMILIATED OR EMOTIONALLY ABUSED IN OTHER WAYS BY YOUR PARTNER OR EX-PARTNER?: PATIENT DECLINED

## 2025-01-10 SDOH — SOCIAL STABILITY: SOCIAL NETWORK: HOW OFTEN DO YOU ATTENT MEETINGS OF THE CLUB OR ORGANIZATION YOU BELONG TO?: NEVER

## 2025-01-10 SDOH — HEALTH STABILITY: MENTAL HEALTH: HOW MANY STANDARD DRINKS CONTAINING ALCOHOL DO YOU HAVE ON A TYPICAL DAY?: PATIENT DOES NOT DRINK

## 2025-01-10 SDOH — HEALTH STABILITY: PHYSICAL HEALTH: ON AVERAGE, HOW MANY DAYS PER WEEK DO YOU ENGAGE IN MODERATE TO STRENUOUS EXERCISE (LIKE A BRISK WALK)?: 0 DAYS

## 2025-01-10 SDOH — ECONOMIC STABILITY: INCOME INSECURITY: HOW HARD IS IT FOR YOU TO PAY FOR THE VERY BASICS LIKE FOOD, HOUSING, MEDICAL CARE, AND HEATING?: NOT HARD AT ALL

## 2025-01-10 NOTE — CARE COORDINATION
Ambulatory Care Coordination Note     1/10/2025 2:50 PM     Patient Current Location:  Home: [address] [facility] [address]     ACM contacted the patient by telephone. Verified name and  with patient as identifiers.         ACM: Kimberly Reyes, RN     Challenges to be reviewed by the provider   Additional needs identified to be addressed with provider No  none               Method of communication with provider: none.    Utilization: Patient has not had any utilization since our last call.     Care Summary Note: Patient reports doing well today.  Patient granddaughter with patient today (PHI form verified).  Granddaughter informed ACM that patient completed Echocardiogram , patient is waiting for callback from Cardiology to discuss the results.      Patient granddaughter confirmed 2/10 PCP appointment.    No further needs, concerns or questions during this call.     Offered patient enrollment in the Remote Patient Monitoring (RPM) program for in-home monitoring: Deferred at this time because  ; will discuss at next outreach.     Assessments Completed:       1/10/2025     2:37 PM   Amb Fall Risk Assessment and TUG Test   Do you feel unsteady or are you worried about falling?  yes   2 or more falls in past year? no   Fall with injury in past year? no    ,   Ambulatory Care Coordination Assessment    Care Coordination Protocol  Referral from Primary Care Provider: No  Week 1 - Initial Assessment     Do you have all of your prescriptions and are they filled?: Yes  Barriers to medication adherence: None  Are you able to afford your medications?: Yes  How often do you have trouble taking your medications the way you have been told to take them?: I always take them as prescribed.     Do you have Home O2 Therapy?: No      Ability to seek help/take action for Emergent Urgent situations i.e. fire, crime, inclement weather or health crisis.: Independent  Ability to ambulate to restroom: Needs Assistance  Ability

## 2025-01-23 ENCOUNTER — CARE COORDINATION (OUTPATIENT)
Facility: CLINIC | Age: 89
End: 2025-01-23

## 2025-01-23 ASSESSMENT — PATIENT HEALTH QUESTIONNAIRE - PHQ9
SUM OF ALL RESPONSES TO PHQ QUESTIONS 1-9: 0
SUM OF ALL RESPONSES TO PHQ QUESTIONS 1-9: 0
SUM OF ALL RESPONSES TO PHQ9 QUESTIONS 1 & 2: 0
2. FEELING DOWN, DEPRESSED OR HOPELESS: NOT AT ALL
1. LITTLE INTEREST OR PLEASURE IN DOING THINGS: NOT AT ALL
SUM OF ALL RESPONSES TO PHQ QUESTIONS 1-9: 0
SUM OF ALL RESPONSES TO PHQ QUESTIONS 1-9: 0

## 2025-01-23 NOTE — CARE COORDINATION
Ambulatory Care Coordination Note     2025 3:41 PM     Patient Current Location:  Home: [address] [place] [id number]     ACM contacted the patient by telephone. Verified name and  with patient as identifiers.         ACM: Kimberly Reyes, RN     Challenges to be reviewed by the provider   Additional needs identified to be addressed with provider No  none               Method of communication with provider: none.    Utilization: Patient has not had any utilization since our last call.     Care Summary Note: Patient reports doing well today. Patient states she is staying indoors due to the cold weather and recent snow and ice.     Patient with no further needs, concerns or questions during this call.     Offered patient enrollment in the Remote Patient Monitoring (RPM) program for in-home monitoring: Yes, but did not enroll at this time: controlled chronic disease management and already monitoring with home equipment.     Assessments Completed:   Diabetes Assessment    Medic Alert ID: No  Meal Planning: Avoidance of concentrated sweets   How often do you test your blood sugar?: No Testing   Do you have barriers with adherence to non-pharmacologic self-management interventions? (Nutrition/Exercise/Self-Monitoring): No   Have you ever had to go to the ED for symptoms of low blood sugar?: No       No patient-reported symptoms         ,   Hypertension - Encounter Level          ,   General Assessment    Do you have any symptoms that are causing concern?: No      , No changes since last call    Medications Reviewed:   Patient denies any changes with medications and reports taking all medications as prescribed. and Completed during a previous call     Advance Care Planning:   Not reviewed during this call     Care Planning:    Goals Addressed                   This Visit's Progress     Conditions and Symptoms   On track     I will schedule office visits, as directed by my provider.  I will keep my

## 2025-02-06 ENCOUNTER — CARE COORDINATION (OUTPATIENT)
Facility: CLINIC | Age: 89
End: 2025-02-06

## 2025-02-06 NOTE — CARE COORDINATION
Ambulatory Care Coordination Note     2025 1:28 PM     Patient Current Location:  Home: [address] [place] [id number]     ACM contacted the patient by telephone. Verified name and  with patient as identifiers.     Patient graduated from the High Risk Care Management program on 2025.  Patient verbalizes confidence in the ability to self-manage at this time. has the ability to self manage at this time. Patient granddaughter assists with management of patient .  Care management goals have been completed. No further Ambulatory Care Manager follow up scheduled.      ACM: Kimberly Reyes, RN     Challenges to be reviewed by the provider   Additional needs identified to be addressed with provider No  none               Method of communication with provider: none.    Utilization: Patient has not had any utilization since our last call.     Care Summary Note: Patient reports doing well today.  Patient confirmed 2/10 PCP appointment.     Patient with no further needs, concerns or questions during this call.     Offered patient enrollment in the Remote Patient Monitoring (RPM) program for in-home monitoring: Yes, but did not enroll at this time: patient declined during previous call .     Assessments Completed:   Diabetes Assessment    Medic Alert ID: No  Meal Planning: Avoidance of concentrated sweets   How often do you test your blood sugar?: No Testing   Do you have barriers with adherence to non-pharmacologic self-management interventions? (Nutrition/Exercise/Self-Monitoring): No   Have you ever had to go to the ED for symptoms of low blood sugar?: No       No patient-reported symptoms         ,   Hypertension - Encounter Level          ,   General Assessment    Do you have any symptoms that are causing concern?: No      , No changes since last call    Medications Reviewed:   Patient denies any changes with medications and reports taking all medications as prescribed. and Completed during a previous

## 2025-02-10 ENCOUNTER — OFFICE VISIT (OUTPATIENT)
Facility: CLINIC | Age: 89
End: 2025-02-10

## 2025-02-10 VITALS
OXYGEN SATURATION: 93 % | RESPIRATION RATE: 16 BRPM | WEIGHT: 139.8 LBS | SYSTOLIC BLOOD PRESSURE: 146 MMHG | HEART RATE: 48 BPM | DIASTOLIC BLOOD PRESSURE: 68 MMHG | TEMPERATURE: 97.3 F | HEIGHT: 61 IN | BODY MASS INDEX: 26.39 KG/M2

## 2025-02-10 DIAGNOSIS — N18.31 CHRONIC KIDNEY DISEASE, STAGE 3A (HCC): ICD-10-CM

## 2025-02-10 DIAGNOSIS — I47.9 PAROXYSMAL TACHYCARDIA (HCC): ICD-10-CM

## 2025-02-10 DIAGNOSIS — E11.21 TYPE 2 DIABETES WITH NEPHROPATHY (HCC): ICD-10-CM

## 2025-02-10 DIAGNOSIS — I10 ESSENTIAL (PRIMARY) HYPERTENSION: ICD-10-CM

## 2025-02-10 DIAGNOSIS — Z00.00 MEDICARE ANNUAL WELLNESS VISIT, SUBSEQUENT: Primary | ICD-10-CM

## 2025-02-10 DIAGNOSIS — F03.90 DEMENTIA WITHOUT BEHAVIORAL DISTURBANCE (HCC): ICD-10-CM

## 2025-02-10 LAB — HBA1C MFR BLD: 5.7 %

## 2025-02-10 ASSESSMENT — PATIENT HEALTH QUESTIONNAIRE - PHQ9
SUM OF ALL RESPONSES TO PHQ QUESTIONS 1-9: 1
1. LITTLE INTEREST OR PLEASURE IN DOING THINGS: NOT AT ALL
SUM OF ALL RESPONSES TO PHQ QUESTIONS 1-9: 1
2. FEELING DOWN, DEPRESSED OR HOPELESS: SEVERAL DAYS
SUM OF ALL RESPONSES TO PHQ QUESTIONS 1-9: 1
SUM OF ALL RESPONSES TO PHQ QUESTIONS 1-9: 1
SUM OF ALL RESPONSES TO PHQ9 QUESTIONS 1 & 2: 1

## 2025-02-10 NOTE — PATIENT INSTRUCTIONS
Instructions  Overview     Coronary artery disease, also called heart disease, occurs when a substance called plaque builds up in the vessels that supply oxygen-rich blood to your heart muscle. This can narrow the blood vessels and reduce blood flow. A heart attack happens when blood flow is completely blocked. A high-fat diet, smoking, and other factors increase the risk of heart disease.  Your doctor has found that you have a chance of having heart disease. A heart-healthy lifestyle can help keep your heart healthy and prevent heart disease. This lifestyle includes eating healthy, being active, staying at a weight that's healthy for you, and not smoking or using tobacco. It also includes taking medicines as directed, managing other health conditions, and trying to get a healthy amount of sleep.  Follow-up care is a key part of your treatment and safety. Be sure to make and go to all appointments, and call your doctor if you are having problems. It's also a good idea to know your test results and keep a list of the medicines you take.  How can you care for yourself at home?  Diet    Use less salt when you cook and eat. This helps lower your blood pressure. Taste food before salting. Add only a little salt when you think you need it. With time, your taste buds will adjust to less salt.     Eat fewer snack items, fast foods, canned soups, and other high-salt, high-fat, processed foods.     Read food labels and try to avoid saturated and trans fats. They increase your risk of heart disease by raising cholesterol levels.     Limit the amount of solid fat--butter, margarine, and shortening--you eat. Use olive, peanut, or canola oil when you cook. Bake, broil, and steam foods instead of frying them.     Eat a variety of fruit and vegetables every day. Dark green, deep orange, red, or yellow fruits and vegetables are especially good for you. Examples include spinach, carrots, peaches, and berries.     Foods high in fiber

## 2025-02-10 NOTE — PROGRESS NOTES
Room 2      Magaly Cordero had concerns including Medicare AWV. for today's visit .     When asked if patient has any concerns she/he would like to address with Dr. Tiffany Moreno, do you see any report for her Holter monitor or echo cardiogram? Dr. Tiffany Moreno has been notified of patient concerns .      Did you take your medication today?  Yes      1. \"Have you been to the ER, urgent care clinic since your last visit?  Hospitalized since your last visit?\" .NO    2. \"Have you seen or consulted any other health care providers outside of the [facility] since your last visit?\" No    3. For patients aged 45-75: Has the patient had a colonoscopy / FIT/ Cologuard? N/A      If the patient is female:    4. For patients aged 40-74: Has the patient had a mammogram within the past 2 years? N/A      5. For patients aged 21-65: Has the patient had a pap smear? {Cancer Care Gap present? N/A            2/10/2025    11:20 AM   PHQ-9    Little interest or pleasure in doing things 0   Feeling down, depressed, or hopeless 1   PHQ-2 Score 1   PHQ-9 Total Score 1             No data to display                Who is the primary learner? Patient    What is the preferred language for health care of the primary learner? ENGLISH    How does the primary learner prefer to learn new concepts? DEMONSTRATION    Answered By Patient    Relationship to Learner SELF    Highest level of education completed by primary learner? DID NOT GRADUATE HIGH SCHOOL    Are there any barriers / factors that could impact learning? NONE    Will there be a co-learner / caregiver? Yes    Co-Learner / Caregiver Name (if applicable) Mallorie Grand Daughter   Highest level of education completed by co-learner? 4 YEARS OF COLLEGE    Are there barriers/factors that could impact learning(co-learner)? NONE    What is the preferred language of the co-learner (if applicable) ENGLISH    Is an  needed? No    How does the co-learner prefer to learn new 
respiratory distress and well hydrated.  HEENT:  NC/AT, pink conj, anicteric sclerae  Neck:  No cervical lymphadenopathy, no JVD, no thyromegaly, no carotid bruit  Heart:  RRR without M/R/G  Lungs:  CTAB, no rhonchi, rales, or wheezes with good air exchange   Abdomen:  Non-tender, pos bowel sounds, no hepatosplenomegaly  Ext:  No C/C/E    Skin: no rash  Neuro: no lateralizing signs, CNs II-XII intact             No Known Allergies  Prior to Visit Medications    Medication Sig Taking? Authorizing Provider   donepezil (ARICEPT) 10 MG tablet Take 1 tablet by mouth nightly Yes Lorraine Frye MD   clobetasol (TEMOVATE) 0.05 % ointment Apply topically 2 times daily as needed to the affected area Yes Lorraine Frye MD   metoprolol tartrate (LOPRESSOR) 25 MG tablet Take 0.5 tablets by mouth 2 times daily Yes Lorraine Frye MD   albuterol sulfate HFA (PROVENTIL;VENTOLIN;PROAIR) 108 (90 Base) MCG/ACT inhaler Inhale 1 puff into the lungs every 6 hours as needed for Wheezing or Shortness of Breath Yes Lorraine Frye MD   amLODIPine (NORVASC) 2.5 MG tablet Take 1 tablet by mouth daily Yes Lorraine Frye MD   acetaminophen (TYLENOL) 325 MG tablet Take by mouth every 4 hours as needed Yes Automatic Reconciliation, Ar   aspirin 81 MG EC tablet Take 1 tablet by mouth daily Yes Automatic Reconciliation, Ar   cetirizine (ZYRTEC) 10 MG tablet Take 1 tablet by mouth daily as needed Yes Automatic Reconciliation, Ar       CareTeam (Including outside providers/suppliers regularly involved in providing care):   Patient Care Team:  Lorraine Frye MD as PCP - General  Lorraine Frye MD as PCP - Empaneled Provider  Rajesh Lamb MD as Physician     Recommendations for Preventive Services Due: see orders and patient instructions/AVS.  Recommended screening schedule for the next 5-10 years is provided to the patient in written form: see Patient Instructions/AVS.     Reviewed and updated this

## 2025-03-03 ENCOUNTER — OFFICE VISIT (OUTPATIENT)
Age: 89
End: 2025-03-03
Payer: MEDICARE

## 2025-03-03 VITALS
WEIGHT: 138 LBS | DIASTOLIC BLOOD PRESSURE: 60 MMHG | HEART RATE: 55 BPM | HEIGHT: 61 IN | BODY MASS INDEX: 26.06 KG/M2 | SYSTOLIC BLOOD PRESSURE: 114 MMHG | OXYGEN SATURATION: 98 %

## 2025-03-03 DIAGNOSIS — I48.0 PAF (PAROXYSMAL ATRIAL FIBRILLATION) (HCC): ICD-10-CM

## 2025-03-03 DIAGNOSIS — I10 ESSENTIAL HYPERTENSION WITH GOAL BLOOD PRESSURE LESS THAN 140/90: Primary | ICD-10-CM

## 2025-03-03 DIAGNOSIS — E78.00 PURE HYPERCHOLESTEROLEMIA: ICD-10-CM

## 2025-03-03 PROCEDURE — 1123F ACP DISCUSS/DSCN MKR DOCD: CPT | Performed by: INTERNAL MEDICINE

## 2025-03-03 PROCEDURE — G8417 CALC BMI ABV UP PARAM F/U: HCPCS | Performed by: INTERNAL MEDICINE

## 2025-03-03 PROCEDURE — 1036F TOBACCO NON-USER: CPT | Performed by: INTERNAL MEDICINE

## 2025-03-03 PROCEDURE — G8428 CUR MEDS NOT DOCUMENT: HCPCS | Performed by: INTERNAL MEDICINE

## 2025-03-03 PROCEDURE — 1126F AMNT PAIN NOTED NONE PRSNT: CPT | Performed by: INTERNAL MEDICINE

## 2025-03-03 PROCEDURE — 1090F PRES/ABSN URINE INCON ASSESS: CPT | Performed by: INTERNAL MEDICINE

## 2025-03-03 PROCEDURE — 99214 OFFICE O/P EST MOD 30 MIN: CPT | Performed by: INTERNAL MEDICINE

## 2025-03-03 RX ORDER — AMIODARONE HYDROCHLORIDE 100 MG/1
100 TABLET ORAL
Qty: 90 TABLET | Refills: 1 | Status: SHIPPED | OUTPATIENT
Start: 2025-03-03

## 2025-03-03 NOTE — PROGRESS NOTES
decided against it     A.fib:  Event monitor in 12/2024 suggest paroxysmal atrial fibrillation with A-fib burden of 30%  Slowest heart rate was 34 bpm with pauses up to 2.3 seconds  Currently on metoprolol 12.5 mg and taking it once a day which I recommend to continue  Discussed the management of A-fib with the patient and the family were in great detail.  Stroke prophylaxis with aspirin versus anticoagulation discussed  After lengthy discussion, they would like to continue conservative management which is appropriate considering patient's advanced age and comorbidity  Would use amiodarone 100 mg 3 times a week to see if that helps maintaining rhythm.  Family member will keep checking heart rate at home and blood pressure at home regularly  After lengthy discussion, patient and the family would like to continue only aspirin.  Patient is very unsteady however no falls.  They would like to continue only aspirin and did not want anticoagulation      Paroxysmal SVT:   Diagnosed on Holter monitor which was performed in March 2020.   ECHO 902/2022) , low risk, Normal EF and no major VHD   Event monitor in 02/2022, sinus rhythm with occasional episode of PSVT with heart rate as high as 170 bpm.  Pause up to 2.2 seconds during sleep hours   Continue metoprolol.  Starting amiodarone as mentioned above    Thank you for allowing me to participate in patient care. Please feel free to call me if you have any question or concern.     Jeovanny Gonzalez MD  Please note: This document has been produced using voice recognition software. Unrecognized errors in transcription may be present.

## 2025-03-17 ENCOUNTER — TELEPHONE (OUTPATIENT)
Age: 89
End: 2025-03-17

## 2025-03-17 NOTE — TELEPHONE ENCOUNTER
Verbal order with read back Jeovanny Gonzalez MD.  Continue as ordered by hospital until f/u appt

## 2025-03-17 NOTE — TELEPHONE ENCOUNTER
Contacted pts granddaughter at  number. Two patient Identifiers confirmed. Informed pts granddaughter  per Dr Gonzalez. Pt s granddaughter verbalized understanding.

## 2025-03-17 NOTE — TELEPHONE ENCOUNTER
Patient's Grand-daughter Mallorie called to report patient was kept overnight at [facility] after passing out. She states patient was taken off Metoprolol and Amiodarone. Patient is unsure if she is to resume these medications or how to proceed and would like a call back. Mallorie would like a call back at [phone].  A post hospital appt was scheduled for 3/31 at the  office.

## 2025-03-31 ENCOUNTER — OFFICE VISIT (OUTPATIENT)
Age: 89
End: 2025-03-31
Payer: MEDICARE

## 2025-03-31 VITALS
WEIGHT: 135 LBS | BODY MASS INDEX: 25.49 KG/M2 | HEIGHT: 61 IN | OXYGEN SATURATION: 92 % | DIASTOLIC BLOOD PRESSURE: 60 MMHG | HEART RATE: 63 BPM | SYSTOLIC BLOOD PRESSURE: 112 MMHG

## 2025-03-31 DIAGNOSIS — I48.0 PAF (PAROXYSMAL ATRIAL FIBRILLATION) (HCC): ICD-10-CM

## 2025-03-31 DIAGNOSIS — I10 ESSENTIAL HYPERTENSION WITH GOAL BLOOD PRESSURE LESS THAN 140/90: Primary | ICD-10-CM

## 2025-03-31 PROCEDURE — G8428 CUR MEDS NOT DOCUMENT: HCPCS | Performed by: INTERNAL MEDICINE

## 2025-03-31 PROCEDURE — 1126F AMNT PAIN NOTED NONE PRSNT: CPT | Performed by: INTERNAL MEDICINE

## 2025-03-31 PROCEDURE — 1090F PRES/ABSN URINE INCON ASSESS: CPT | Performed by: INTERNAL MEDICINE

## 2025-03-31 PROCEDURE — 99214 OFFICE O/P EST MOD 30 MIN: CPT | Performed by: INTERNAL MEDICINE

## 2025-03-31 PROCEDURE — 1123F ACP DISCUSS/DSCN MKR DOCD: CPT | Performed by: INTERNAL MEDICINE

## 2025-03-31 PROCEDURE — 1036F TOBACCO NON-USER: CPT | Performed by: INTERNAL MEDICINE

## 2025-03-31 PROCEDURE — G8417 CALC BMI ABV UP PARAM F/U: HCPCS | Performed by: INTERNAL MEDICINE

## 2025-03-31 NOTE — PROGRESS NOTES
.  She used to be on atorvastatin.  Offered restarting atorvastatin in the past however decided against it     A.fib:  Event monitor in 12/2024 suggest paroxysmal atrial fibrillation with A-fib burden of 30%  Slowest heart rate was 34 bpm with pauses up to 2.3 seconds  Saw patient recently and at the time amiodarone was started to avoid paroxysm of A-fib however patient recently had syncopal episode.  Amiodarone was discontinued.  Will continue to discontinue amiodarone  Will resume d metoprolol 12.5 mg daily, at a prior dose to avoid tachycardia   Currently on aspirin for stroke prophylaxis.  Has decided against anticoagulation because of advanced age and fall risk      Paroxysmal SVT:   Diagnosed on Holter monitor which was performed in March 2020.   ECHO 902/2022) , low risk, Normal EF and no major VHD   Event monitor in 02/2022, sinus rhythm with occasional episode of PSVT with heart rate as high as 170 bpm.  Pause up to 2.2 seconds during sleep hours   Continue metoprolol.     Thank you for allowing me to participate in patient care. Please feel free to call me if you have any question or concern.     Jeovanny Gonzalez MD  Please note: This document has been produced using voice recognition software. Unrecognized errors in transcription may be present.

## 2025-06-06 DIAGNOSIS — I10 ESSENTIAL (PRIMARY) HYPERTENSION: ICD-10-CM

## 2025-06-07 RX ORDER — AMLODIPINE BESYLATE 2.5 MG/1
2.5 TABLET ORAL DAILY
Qty: 90 TABLET | Refills: 1 | Status: SHIPPED | OUTPATIENT
Start: 2025-06-07

## 2025-06-09 DIAGNOSIS — I10 ESSENTIAL (PRIMARY) HYPERTENSION: ICD-10-CM

## 2025-06-09 RX ORDER — AMLODIPINE BESYLATE 2.5 MG/1
2.5 TABLET ORAL DAILY
Qty: 90 TABLET | Refills: 1 | Status: SHIPPED | OUTPATIENT
Start: 2025-06-09

## 2025-06-10 ENCOUNTER — OFFICE VISIT (OUTPATIENT)
Facility: CLINIC | Age: 89
End: 2025-06-10
Payer: MEDICARE

## 2025-06-10 VITALS
HEIGHT: 61 IN | DIASTOLIC BLOOD PRESSURE: 72 MMHG | WEIGHT: 135 LBS | OXYGEN SATURATION: 94 % | HEART RATE: 57 BPM | RESPIRATION RATE: 14 BRPM | SYSTOLIC BLOOD PRESSURE: 127 MMHG | BODY MASS INDEX: 25.49 KG/M2 | TEMPERATURE: 97.7 F

## 2025-06-10 DIAGNOSIS — F03.90 DEMENTIA WITHOUT BEHAVIORAL DISTURBANCE (HCC): ICD-10-CM

## 2025-06-10 DIAGNOSIS — I10 ESSENTIAL (PRIMARY) HYPERTENSION: Primary | ICD-10-CM

## 2025-06-10 DIAGNOSIS — K11.8 MASS OF PAROTID GLAND: ICD-10-CM

## 2025-06-10 DIAGNOSIS — N18.31 CHRONIC KIDNEY DISEASE, STAGE 3A (HCC): ICD-10-CM

## 2025-06-10 DIAGNOSIS — I47.9 PAROXYSMAL TACHYCARDIA (HCC): ICD-10-CM

## 2025-06-10 PROCEDURE — G8427 DOCREV CUR MEDS BY ELIG CLIN: HCPCS | Performed by: INTERNAL MEDICINE

## 2025-06-10 PROCEDURE — 99214 OFFICE O/P EST MOD 30 MIN: CPT | Performed by: INTERNAL MEDICINE

## 2025-06-10 PROCEDURE — 1160F RVW MEDS BY RX/DR IN RCRD: CPT | Performed by: INTERNAL MEDICINE

## 2025-06-10 PROCEDURE — 1036F TOBACCO NON-USER: CPT | Performed by: INTERNAL MEDICINE

## 2025-06-10 PROCEDURE — 1123F ACP DISCUSS/DSCN MKR DOCD: CPT | Performed by: INTERNAL MEDICINE

## 2025-06-10 PROCEDURE — 1090F PRES/ABSN URINE INCON ASSESS: CPT | Performed by: INTERNAL MEDICINE

## 2025-06-10 PROCEDURE — 1159F MED LIST DOCD IN RCRD: CPT | Performed by: INTERNAL MEDICINE

## 2025-06-10 PROCEDURE — G8417 CALC BMI ABV UP PARAM F/U: HCPCS | Performed by: INTERNAL MEDICINE

## 2025-06-10 ASSESSMENT — PATIENT HEALTH QUESTIONNAIRE - PHQ9
SUM OF ALL RESPONSES TO PHQ QUESTIONS 1-9: 0
1. LITTLE INTEREST OR PLEASURE IN DOING THINGS: NOT AT ALL
SUM OF ALL RESPONSES TO PHQ QUESTIONS 1-9: 0
2. FEELING DOWN, DEPRESSED OR HOPELESS: NOT AT ALL

## 2025-06-10 NOTE — PROGRESS NOTES
Have you been to the ER, urgent care clinic since your last visit?  Hospitalized since your last visit?   NO    Have you seen or consulted any other health care providers outside our system since your last visit?   Yes- Follow up with Cardiology             
   Essential hypertension 5/25/2017    Hyperlipidemia 5/25/2017    Paroxysmal tachycardia (HCC) 9/18/2018    Primary osteoarthritis of both knees        Current Outpatient Medications   Medication Sig Dispense Refill    amLODIPine (NORVASC) 2.5 MG tablet Take 1 tablet by mouth daily 90 tablet 1    donepezil (ARICEPT) 10 MG tablet Take 1 tablet by mouth nightly 90 tablet 1    clobetasol (TEMOVATE) 0.05 % ointment Apply topically 2 times daily as needed to the affected area 30 g 0    metoprolol tartrate (LOPRESSOR) 25 MG tablet Take 0.5 tablets by mouth 2 times daily 90 tablet 1    albuterol sulfate HFA (PROVENTIL;VENTOLIN;PROAIR) 108 (90 Base) MCG/ACT inhaler Inhale 1 puff into the lungs every 6 hours as needed for Wheezing or Shortness of Breath 18 g 1    acetaminophen (TYLENOL) 325 MG tablet Take by mouth every 4 hours as needed      aspirin 81 MG EC tablet Take 1 tablet by mouth daily      cetirizine (ZYRTEC) 10 MG tablet Take 1 tablet by mouth daily as needed       No current facility-administered medications for this visit.       Social History     Tobacco Use   Smoking Status Never   Smokeless Tobacco Never       No Known Allergies    Patient Labs were reviewed: yes    Patient Past Records were reviewed: yes      Objective:     Vitals:    06/10/25 0836   BP: 127/72   Pulse: 57   Resp: 14   Temp: 97.7 °F (36.5 °C)   TempSrc: Temporal   SpO2: 94%   Weight: 61.2 kg (135 lb)   Height: 1.549 m (5' 1\")     Body mass index is 25.51 kg/m².    Exam:   Appearance: alert, well appearing,  oriented to person, place, and time, acyanotic, in no respiratory distress and well hydrated.  HEENT:  NC/AT, pink conj, anicteric sclerae  Neck:  No cervical lymphadenopathy, no JVD, no thyromegaly, no carotid bruit  Heart:  RRR without M/R/G  Lungs:  CTAB, no rhonchi, rales, or wheezes with good air exchange   Abdomen:  Non-tender, pos bowel sounds, no hepatosplenomegaly  Ext:  No C/C/E    Skin: no rash  Neuro: no lateralizing signs,

## 2025-06-17 ENCOUNTER — OFFICE VISIT (OUTPATIENT)
Age: 89
End: 2025-06-17
Payer: MEDICARE

## 2025-06-17 VITALS
WEIGHT: 138 LBS | BODY MASS INDEX: 26.06 KG/M2 | SYSTOLIC BLOOD PRESSURE: 144 MMHG | HEIGHT: 61 IN | HEART RATE: 52 BPM | DIASTOLIC BLOOD PRESSURE: 75 MMHG

## 2025-06-17 DIAGNOSIS — I48.0 PAROXYSMAL A-FIB (HCC): ICD-10-CM

## 2025-06-17 DIAGNOSIS — I47.10 PSVT (PAROXYSMAL SUPRAVENTRICULAR TACHYCARDIA): Primary | ICD-10-CM

## 2025-06-17 DIAGNOSIS — I10 ESSENTIAL HYPERTENSION WITH GOAL BLOOD PRESSURE LESS THAN 140/90: ICD-10-CM

## 2025-06-17 PROCEDURE — 1123F ACP DISCUSS/DSCN MKR DOCD: CPT | Performed by: PHYSICIAN ASSISTANT

## 2025-06-17 PROCEDURE — 1036F TOBACCO NON-USER: CPT | Performed by: PHYSICIAN ASSISTANT

## 2025-06-17 PROCEDURE — G8417 CALC BMI ABV UP PARAM F/U: HCPCS | Performed by: PHYSICIAN ASSISTANT

## 2025-06-17 PROCEDURE — 1126F AMNT PAIN NOTED NONE PRSNT: CPT | Performed by: PHYSICIAN ASSISTANT

## 2025-06-17 PROCEDURE — 1090F PRES/ABSN URINE INCON ASSESS: CPT | Performed by: PHYSICIAN ASSISTANT

## 2025-06-17 PROCEDURE — G8428 CUR MEDS NOT DOCUMENT: HCPCS | Performed by: PHYSICIAN ASSISTANT

## 2025-06-17 PROCEDURE — 99214 OFFICE O/P EST MOD 30 MIN: CPT | Performed by: PHYSICIAN ASSISTANT

## 2025-06-17 ASSESSMENT — PATIENT HEALTH QUESTIONNAIRE - PHQ9
SUM OF ALL RESPONSES TO PHQ QUESTIONS 1-9: 0
SUM OF ALL RESPONSES TO PHQ QUESTIONS 1-9: 0
1. LITTLE INTEREST OR PLEASURE IN DOING THINGS: NOT AT ALL
SUM OF ALL RESPONSES TO PHQ QUESTIONS 1-9: 0
2. FEELING DOWN, DEPRESSED OR HOPELESS: NOT AT ALL
SUM OF ALL RESPONSES TO PHQ QUESTIONS 1-9: 0

## 2025-06-17 NOTE — PROGRESS NOTES
[facility] Cardiology     Magaly Cordero is a 89 y.o. female    Presents to the office today for follow up. No recurrent episodes of syncope since her last appt. She continues to have occasional palpitations.  Denies CP, SOB, diaphoresis, N/V/D, weight gain, LE edema, orthopnea, PND, palpitations, near syncope or syncope    Past Medical History:   Diagnosis Date    Acquired hypothyroidism 5/12/2022    Diabetes (HCC)     Essential hypertension 5/25/2017    Hyperlipidemia 5/25/2017    Paroxysmal tachycardia (HCC) 9/18/2018    Primary osteoarthritis of both knees        Past Surgical History:   Procedure Laterality Date    HYSTERECTOMY (CERVIX STATUS UNKNOWN)         Current Outpatient Medications   Medication Sig    amLODIPine (NORVASC) 2.5 MG tablet Take 1 tablet by mouth daily    donepezil (ARICEPT) 10 MG tablet Take 1 tablet by mouth nightly    clobetasol (TEMOVATE) 0.05 % ointment Apply topically 2 times daily as needed to the affected area    metoprolol tartrate (LOPRESSOR) 25 MG tablet Take 0.5 tablets by mouth 2 times daily    albuterol sulfate HFA (PROVENTIL;VENTOLIN;PROAIR) 108 (90 Base) MCG/ACT inhaler Inhale 1 puff into the lungs every 6 hours as needed for Wheezing or Shortness of Breath    acetaminophen (TYLENOL) 325 MG tablet Take by mouth every 4 hours as needed    aspirin 81 MG EC tablet Take 1 tablet by mouth daily    cetirizine (ZYRTEC) 10 MG tablet Take 1 tablet by mouth daily as needed     No current facility-administered medications for this visit.       Allergies and Sensitivities:  No Known Allergies    Family History:  Family History   Problem Relation Age of Onset    Kidney Disease Mother     No Known Problems Father     Hypertension Sister     Kidney Disease Brother     No Known Problems Maternal Aunt     No Known Problems Maternal Uncle     No Known Problems Paternal Aunt     No Known Problems Paternal Uncle     No Known Problems Maternal Grandmother     No Known Problems Maternal

## 2025-06-17 NOTE — PROGRESS NOTES
Magaly Cordero presents today for   Chief Complaint   Patient presents with    Follow-up       Magaly Cordero preferred language for health care discussion is english/other.    Is someone accompanying this pt? no    Is the patient using any DME equipment during OV? no    Depression Screening:  Depression: Not at risk (6/17/2025)    PHQ-2     PHQ-2 Score: 0        Learning Assessment:  Who is the primary learner? Patient    What is the preferred language for health care of the primary learner? ENGLISH    How does the primary learner prefer to learn new concepts? DEMONSTRATION    Answered By patient    Relationship to Learner SELF           Pt currently taking Anticoagulant therapy? no    Pt currently taking Antiplatelet therapy ? aspirin      Coordination of Care:  1. Have you been to the ER, urgent care clinic since your last visit? Hospitalized since your last visit? no    2. Have you seen or consulted any other health care providers outside of the [facility] since your last visit? Include any pap smears or colon screening. no

## 2025-08-20 DIAGNOSIS — I10 ESSENTIAL (PRIMARY) HYPERTENSION: ICD-10-CM

## 2025-08-21 RX ORDER — METOPROLOL TARTRATE 25 MG/1
12.5 TABLET, FILM COATED ORAL 2 TIMES DAILY
Qty: 90 TABLET | Refills: 1 | Status: SHIPPED | OUTPATIENT
Start: 2025-08-21

## 2025-08-21 RX ORDER — AMLODIPINE BESYLATE 2.5 MG/1
2.5 TABLET ORAL DAILY
Qty: 90 TABLET | Refills: 1 | Status: SHIPPED | OUTPATIENT
Start: 2025-08-21